# Patient Record
Sex: FEMALE | Race: WHITE | NOT HISPANIC OR LATINO | Employment: FULL TIME | URBAN - METROPOLITAN AREA
[De-identification: names, ages, dates, MRNs, and addresses within clinical notes are randomized per-mention and may not be internally consistent; named-entity substitution may affect disease eponyms.]

---

## 2017-04-11 ENCOUNTER — OFFICE VISIT (OUTPATIENT)
Dept: OBSTETRICS AND GYNECOLOGY | Facility: CLINIC | Age: 27
End: 2017-04-11
Payer: COMMERCIAL

## 2017-04-11 VITALS
DIASTOLIC BLOOD PRESSURE: 78 MMHG | BODY MASS INDEX: 26.79 KG/M2 | SYSTOLIC BLOOD PRESSURE: 122 MMHG | WEIGHT: 156.06 LBS

## 2017-04-11 DIAGNOSIS — Z12.4 CERVICAL CANCER SCREENING: ICD-10-CM

## 2017-04-11 DIAGNOSIS — Z01.419 ROUTINE GYNECOLOGICAL EXAMINATION: Primary | ICD-10-CM

## 2017-04-11 DIAGNOSIS — E28.9 OVARIAN DYSFUNCTION: ICD-10-CM

## 2017-04-11 DIAGNOSIS — N63.20 LEFT BREAST MASS: ICD-10-CM

## 2017-04-11 PROCEDURE — 99395 PREV VISIT EST AGE 18-39: CPT | Mod: S$GLB,,, | Performed by: OBSTETRICS & GYNECOLOGY

## 2017-04-11 PROCEDURE — 99999 PR PBB SHADOW E&M-EST. PATIENT-LVL III: CPT | Mod: PBBFAC,,, | Performed by: OBSTETRICS & GYNECOLOGY

## 2017-04-11 PROCEDURE — 88175 CYTOPATH C/V AUTO FLUID REDO: CPT

## 2017-04-11 RX ORDER — CLOMIPHENE CITRATE 50 MG/1
100 TABLET ORAL DAILY
Qty: 10 TABLET | Refills: 2 | Status: SHIPPED | OUTPATIENT
Start: 2017-04-11 | End: 2017-04-16

## 2017-04-11 RX ORDER — CLOMIPHENE CITRATE 50 MG/1
TABLET ORAL
Refills: 2 | COMMUNITY
Start: 2017-03-23 | End: 2017-04-11

## 2017-04-11 NOTE — PROGRESS NOTES
Chief Complaint   Patient presents with    Well Woman    Medication Management     discuss clomid, has been on it for 6 months       History of Present Illness: Berto Chun is a 26 y.o. female that presents today 4/11/2017 for well gyn visit.    Past Medical History:   Diagnosis Date    Abnormal Pap smear of cervix     Acne     HPV (human papilloma virus) infection     Thyroid goiter        Past Surgical History:   Procedure Laterality Date    CERVICAL BIOPSY  W/ LOOP ELECTRODE EXCISION  8/14, 2/15    x2    COLPOSCOPY  9/2014, 2/2015    mild dysplasia    WISDOM TOOTH EXTRACTION         Current Outpatient Prescriptions   Medication Sig Dispense Refill    PNV#75/IRON FUM/FA/OM3/DHA/EPA (ONE A DAY WOMEN'S PRENATAL DHA ORAL) Take 1 tablet by mouth once daily.      clindamycin-tretinoin (ZIANA) gel Apply a thin coat to the affected area every night.      clomiPHENE (CLOMID) 50 mg tablet Take 2 tablets (100 mg total) by mouth once daily. Day 3,4,5,6,7 10 tablet 2    medroxyPROGESTERone (PROVERA) 10 MG tablet Take 1 tablet (10 mg total) by mouth once daily. 14-24 of each cycle 30 tablet 11     No current facility-administered medications for this visit.        Review of patient's allergies indicates:  No Known Allergies    Family History   Problem Relation Age of Onset    Lung cancer Paternal Grandfather     Leukemia Paternal Grandmother      ALL    Lung cancer Maternal Grandmother     Pancreatic cancer Maternal Grandfather     Hypertension Father     Hyperlipidemia Mother     Diabetes Mother      Insulin dependent    Breast cancer Neg Hx     Ovarian cancer Neg Hx        Social History     Social History    Marital status:      Spouse name: N/A    Number of children: N/A    Years of education: N/A     Social History Main Topics    Smoking status: Never Smoker    Smokeless tobacco: Never Used    Alcohol use 0.0 oz/week     0 Standard drinks or equivalent per week      Comment: Social     Drug use: No    Sexual activity: Yes     Partners: Male     Birth control/ protection: None     Other Topics Concern    None     Social History Narrative       OB History    Para Term  AB SAB TAB Ectopic Multiple Living   0 0 0 0 0 0 0 0 0 0             Review of Symptoms:  GENERAL: Denies weight gain or weight loss. Feeling well overall.   SKIN: Denies rash or lesions.   HEAD: Denies head injury or headache.   NODES: Denies enlarged lymph nodes.   CHEST: Denies chest pain or shortness of breath.   CARDIOVASCULAR: Denies palpitations or left sided chest pain.   ABDOMEN: No abdominal pain, constipation, diarrhea, nausea, vomiting or rectal bleeding.   URINARY: No frequency, dysuria, hematuria, or burning on urination.  HEMATOLOGIC: No easy bruisability or excessive bleeding.   MUSCULOSKELETAL: Denies joint pain or swelling.     /78  Wt 70.8 kg (156 lb 1.4 oz)  LMP 2017  BMI 26.79 kg/m2  Physical Exam:  APPEARANCE: Well nourished, well developed, in no acute distress.  SKIN: Normal skin turgor, no lesions.  NECK: Neck symmetric without masses   RESPIRATORY: Normal respiratory effort with no retractions or use of accessory muscles  CARDIOVASCULAR: Peripheral vascular system with no swelling no varicosities and palpation of pulses normal  LYMPHATIC: No enlargements of the lymph nodes noted in the neck, axillae, or groin  ABDOMEN: Soft. No tenderness or masses. No hepatosplenomegaly. No hernias.  BREASTS: Symmetrical, no skin changes or visible lesions. ++ 9 o'clock left breast mass 4 cm X 6 cm oval mobile non-tender 3 cm from the areola no nipple discharge or adenopathy bilaterally.  PELVIC: Normal external female genitalia without lesions. Normal hair distribution. Adequate perineal body, normal urethral meatus. Urethra with no masses.  Bladder nontender. Vagina moist and well rugated without lesions or discharge. Cervix pink and without lesions. No significant cystocele or rectocele.  Bimanual exam showed uterus normal size, shape, position, mobile and nontender. Adnexa without masses or tenderness. Urethra and bladder normal. PAP DONE  EXTREMITIES: No clubbing cyanosis or edema.    ASSESSMENT/PLAN:  Routine gynecological examination    Cervical cancer screening  -     Liquid-based pap smear, screening    Ovarian dysfunction  -     clomiPHENE (CLOMID) 50 mg tablet; Take 2 tablets (100 mg total) by mouth once daily. Day 3,4,5,6,7  Dispense: 10 tablet; Refill: 2    Left breast mass  -     US Breast Left Complete; Future; Expected date: 4/11/17          Patient was counseled today on Pap guidelines, recommendation for pelvic exams, mammograms every other year after the age of 40 and annually after the age of 50, Colonoscopy after the age of 50, Dexa Bone Scan and calcium and vitamin D supplementation in menopause and to see her PCP for other health maintenance.   FOLLOW-UP:prn

## 2017-04-11 NOTE — MR AVS SNAPSHOT
[facility]  [address] [place] [address]  Phone: [phone]                  Berto Chun   2017 11:20 AM   Office Visit    Description:  Female : 1990   Provider:  Cindi Rodriguez MD   Department:  [facility]           Reason for Visit     Well Woman     Medication Management           Diagnoses this Visit        Comments    Cervical cancer screening    -  Primary            To Do List           Future Appointments        Provider Department Dept Phone    2017 11:20 AM Cindi Rodriguez MD [phone]      Goals (5 Years of Data)     None      [facility]     [facility] On Call Nurse Care Line -  Assistance  Unless otherwise directed by your provider, please contact [facility], our nurse care line that is available for  assistance.     Registered nurses in the [facility] On Call Center provide: appointment scheduling, clinical advisement, health education, and other advisory services.  Call: [phone] (toll free)               Medications           Message regarding Medications     Verify the changes and/or additions to your medication regime listed below are the same as discussed with your clinician today.  If any of these changes or additions are incorrect, please notify your healthcare provider.             Verify that the below list of medications is an accurate representation of the medications you are currently taking.  If none reported, the list may be blank. If incorrect, please contact your healthcare provider. Carry this list with you in case of emergency.           Current Medications     clomiPHENE (CLOMID) 50 mg tablet TK 1 T PO  QD ON CYCLE DAYS 3-7    PNV#75/IRON FUM/FA/OM3/DHA/EPA (ONE A DAY WOMEN'S PRENATAL DHA ORAL) Take 1 tablet by mouth once daily.    clindamycin-tretinoin (ZIANA) gel Apply a thin coat to the affected area every night.    medroxyPROGESTERone (PROVERA) 10 MG tablet  Take 1 tablet (10 mg total) by mouth once daily. 14-24 of each cycle           Clinical Reference Information           Your Vitals Were     BP Weight Last Period BMI       122/78 70.8 kg (156 lb 1.4 oz) 03/23/2017 26.79 kg/m2       Blood Pressure          Most Recent Value    BP  122/78      Allergies as of 4/11/2017     No Known Allergies      Immunizations Administered on Date of Encounter - 4/11/2017     None      Orders Placed During Today's Visit      Normal Orders This Visit    Liquid-based pap smear, screening       Language Assistance Services     ATTENTION: Language assistance services are available, free of charge. Please call [phone].      ATENCIÓN: Si habla español, tiene a bui disposición servicios gratuitos de asistencia lingüística. Llame al [phone].     CHÚ Ý: N?u b?n nói Ti?ng Vi?t, có các d?ch v? h? tr? ngôn ng? mi?n phí dành cho b?n. G?i s? [phone].         [facility] complies with applicable Federal civil rights laws and does not discriminate on the basis of race, color, national origin, age, disability, or sex.

## 2017-04-12 ENCOUNTER — HOSPITAL ENCOUNTER (OUTPATIENT)
Dept: RADIOLOGY | Facility: HOSPITAL | Age: 27
Discharge: HOME | End: 2017-04-12
Attending: OBSTETRICS & GYNECOLOGY
Payer: COMMERCIAL

## 2017-04-12 ENCOUNTER — TELEPHONE (OUTPATIENT)
Dept: RADIOLOGY | Facility: HOSPITAL | Age: 27
End: 2017-04-12

## 2017-04-12 DIAGNOSIS — N63.20 LEFT BREAST MASS: ICD-10-CM

## 2017-04-12 PROCEDURE — 77062 BREAST TOMOSYNTHESIS BI: CPT | Mod: 26,,, | Performed by: RADIOLOGY

## 2017-04-12 PROCEDURE — 76642 ULTRASOUND BREAST LIMITED: CPT | Mod: TC,PO,LT

## 2017-04-12 PROCEDURE — 76642 ULTRASOUND BREAST LIMITED: CPT | Mod: 26,LT,, | Performed by: RADIOLOGY

## 2017-04-12 PROCEDURE — 77066 DX MAMMO INCL CAD BI: CPT | Mod: 26,,, | Performed by: RADIOLOGY

## 2017-04-12 PROCEDURE — 77062 BREAST TOMOSYNTHESIS BI: CPT | Mod: TC

## 2017-04-12 NOTE — TELEPHONE ENCOUNTER
Pt. Has Ultrasound an Diagnostic Mm for left breast lump. Pt. Scheduled for ultrasound guided core Bx. On 4/17/17 @ 8:30. Dr. Keith spoke to pt. Order are in.

## 2017-04-17 ENCOUNTER — HOSPITAL ENCOUNTER (OUTPATIENT)
Dept: RADIOLOGY | Facility: HOSPITAL | Age: 27
Discharge: HOME | End: 2017-04-17
Attending: OBSTETRICS & GYNECOLOGY
Payer: COMMERCIAL

## 2017-04-17 DIAGNOSIS — R92.8 ABNORMAL MAMMOGRAM: ICD-10-CM

## 2017-04-17 DIAGNOSIS — Z87.898 HISTORY OF ABNORMAL MAMMOGRAM: ICD-10-CM

## 2017-04-17 PROCEDURE — 19083 BX BREAST 1ST LESION US IMAG: CPT | Mod: PO

## 2017-04-17 PROCEDURE — 19083 BX BREAST 1ST LESION US IMAG: CPT | Mod: ,,, | Performed by: RADIOLOGY

## 2017-04-17 PROCEDURE — 88360 TUMOR IMMUNOHISTOCHEM/MANUAL: CPT | Mod: 26,,, | Performed by: PATHOLOGY

## 2017-04-17 PROCEDURE — 88305 TISSUE EXAM BY PATHOLOGIST: CPT | Performed by: PATHOLOGY

## 2017-04-17 PROCEDURE — 88305 TISSUE EXAM BY PATHOLOGIST: CPT | Mod: 26,,, | Performed by: PATHOLOGY

## 2017-04-19 ENCOUNTER — TELEPHONE (OUTPATIENT)
Dept: RADIOLOGY | Facility: HOSPITAL | Age: 27
End: 2017-04-19

## 2017-04-19 ENCOUNTER — OFFICE VISIT (OUTPATIENT)
Dept: SURGERY | Facility: CLINIC | Age: 27
End: 2017-04-19
Payer: COMMERCIAL

## 2017-04-19 VITALS — BODY MASS INDEX: 26.64 KG/M2 | HEIGHT: 64 IN | WEIGHT: 156.06 LBS

## 2017-04-19 DIAGNOSIS — C50.912 INVASIVE DUCTAL CARCINOMA OF BREAST, LEFT: Primary | ICD-10-CM

## 2017-04-19 DIAGNOSIS — D05.12 DUCTAL CARCINOMA IN SITU (DCIS) OF LEFT BREAST: ICD-10-CM

## 2017-04-19 PROCEDURE — 99245 OFF/OP CONSLTJ NEW/EST HI 55: CPT | Mod: S$GLB,,, | Performed by: SURGERY

## 2017-04-19 PROCEDURE — 99999 PR PBB SHADOW E&M-EST. PATIENT-LVL III: CPT | Mod: PBBFAC,,, | Performed by: SURGERY

## 2017-04-19 NOTE — LETTER
April 19, 2017      Cindi Rodriguez MD  [address]  [address]  [facility] [address]           Critical access hospital General Surgery  [address] [facility] [address]  [facility] [address]  Phone: [phone]          Patient: Berto Chun   MR Number: [id number]   YOB: 1990   Date of Visit: 4/19/2017       Dear Dr. Cindi Rodriguez:    Thank you for referring Berto Chun to me for evaluation. Attached you will find relevant portions of my assessment and plan of care.    If you have questions, please do not hesitate to call me. I look forward to following Berto Chun along with you.    Sincerely,    Jasiel Sapp MD    Enclosure  CC:  No Recipients    If you would like to receive this communication electronically, please contact [email or web address] or [phone] to request more information on [facility] access.    For providers and/or their staff who would like to refer a patient to [facility], please contact us through our one-stop-shop provider referral line, [facility], at [phone].    If you feel you have received this communication in error or would no longer like to receive these types of communications, please e-mail [email or web address]

## 2017-04-19 NOTE — PROGRESS NOTES
Subjective:       Patient ID: Berto Chun is a 26 y.o. female.    Chief Complaint: Consult (abn mammo, left breast, DCIS)    HPI Comments: Patient presents for surgical treatment of breast cancer. Patient was referred for evaluation and discussion of treatment options for carcinoma of the left UIQ. she is 2 days following core biopsy, left demonstrating an intermediate grade invasive ductal cancer. Estrogen receptor status is positive. Progesterone receptor status is positive. HER-2/raeann receptors FISH assay is pending.    FINAL PATHOLOGIC DIAGNOSIS  CORE BIOPSY, LEFT BREAST SHOWING INTERMEDIATE GRADE DCIS AND INVASIVE DUCT CARCINOMA  (3, 2, 1) WITH MICROCALCIFICATIONS PRESENT. THE LARGEST LINEAR MEASUREMENT OF INVASIVE  CARCINOMA IS APPROXIMATELY 5 MM. THE RESULTS OF IMMUNE RECEPTOR STUDIES ARE PENDING  WITH REPORT TO FOLLOW      Supplemental Diagnosis  THE RESULTS OF IMMUNE RECEPTOR STUDIES ARE AS FOLLOWS: ER IS POSITIVE WITH BETWEEN 90  % OF TUMOR CELL NUCLEI STAINING STRONGLY. ME IS POSITIVE WITH BETWEEN 30-40% OF  TUMOR CELL NUCLEI STAINING STRONGLY. IMMUNOSTAIN FOR HER-2/RAEANN IS EQUIVOCAL AND I WILL  SEND THIS TISSUE BLOCK OUT FOR FISH ANALYSIS. THE POSITIVE AND NEGATIVE CONTROLS STAIN  APPROPRIATELY.     Change was noted 6 months ago. Patient does not routinely do self breast exams.  Patient has noted a change on breast exam. Breast cancer risk factors include none.   Age of menarche was 9.  Last menstrual period was  3/23/17. Patient admits to hormonal therapy. Patient is .  Patient denies nipple discharge. Patient denies to previous breast biopsy. Patient denies a personal history of breast cancer.      Review of Systems   Constitutional: Negative for appetite change, chills, diaphoresis, fatigue, fever and unexpected weight change.   HENT: Negative for hearing loss, sore throat, trouble swallowing and voice change.    Eyes: Negative for visual disturbance.   Respiratory: Negative for cough,  shortness of breath and wheezing.    Cardiovascular: Negative for chest pain, palpitations and leg swelling.   Gastrointestinal: Negative for abdominal distention, abdominal pain, anal bleeding, blood in stool, constipation, diarrhea, nausea, rectal pain and vomiting.   Genitourinary: Negative for difficulty urinating, dysuria, flank pain, frequency, hematuria, menstrual problem and urgency.   Musculoskeletal: Negative for arthralgias, back pain, joint swelling, myalgias and neck pain.   Skin: Negative for pallor and rash.   Neurological: Negative for dizziness, syncope, weakness and headaches.   Hematological: Negative for adenopathy. Does not bruise/bleed easily.   Psychiatric/Behavioral: Negative for suicidal ideas. The patient is not nervous/anxious.        Objective:      Physical Exam   Constitutional: She is oriented to person, place, and time. She appears well-developed and well-nourished. No distress.   HENT:   Head: Normocephalic and atraumatic.   Right Ear: External ear normal.   Left Ear: External ear normal.   Eyes: Conjunctivae are normal. Pupils are equal, round, and reactive to light. Right eye exhibits no discharge. Left eye exhibits no discharge.   Neck: No tracheal deviation present. No thyromegaly present.   Cardiovascular: Normal rate, regular rhythm and normal heart sounds.    Pulmonary/Chest: Effort normal and breath sounds normal. No respiratory distress. Right breast exhibits no inverted nipple, no mass, no nipple discharge, no skin change and no tenderness. Left breast exhibits mass and skin change (echymoses). Left breast exhibits no inverted nipple, no nipple discharge and no tenderness.       Musculoskeletal: She exhibits no edema or tenderness.   Lymphadenopathy:     She has no cervical adenopathy.     She has no axillary adenopathy.   Neurological: She is alert and oriented to person, place, and time. No cranial nerve deficit.   Skin: Skin is warm and dry. No rash noted. She is not  diaphoretic. No pallor.   Psychiatric: She has a normal mood and affect. Her behavior is normal. Judgment and thought content normal.       Assessment:       1. Invasive ductal carcinoma of breast, left    2. Ductal carcinoma in situ (DCIS) of left breast        Plan:       Case discussed with Dr. Gaming who will see patient in consult this week.  Treatment options discussed - lumpectomy/radiation vs mastectomy +/- reconstruction, SLN biopsy, induction chemotherapy (defer to Dr. CAMPO).  PET CT ordered.  Await Dr. Gaming's consultation.  All questions were answered.

## 2017-04-19 NOTE — TELEPHONE ENCOUNTER
Spoke with pt about appt with Dr. Sapp, provided address phone number and time. She had no further questions at this time.

## 2017-04-19 NOTE — TELEPHONE ENCOUNTER
Please call patient in regards to her left breast biopsy results. Patholgy came back as  DCIS and invasive ductal carcinoma. They are still doing hormone receptor studies.

## 2017-04-21 ENCOUNTER — INITIAL CONSULT (OUTPATIENT)
Dept: HEMATOLOGY/ONCOLOGY | Facility: CLINIC | Age: 27
End: 2017-04-21
Payer: COMMERCIAL

## 2017-04-21 ENCOUNTER — PATIENT MESSAGE (OUTPATIENT)
Dept: SURGERY | Facility: CLINIC | Age: 27
End: 2017-04-21

## 2017-04-21 ENCOUNTER — LAB VISIT (OUTPATIENT)
Dept: LAB | Facility: HOSPITAL | Age: 27
End: 2017-04-21
Attending: INTERNAL MEDICINE
Payer: COMMERCIAL

## 2017-04-21 VITALS
TEMPERATURE: 98 F | DIASTOLIC BLOOD PRESSURE: 74 MMHG | BODY MASS INDEX: 27.62 KG/M2 | HEIGHT: 63 IN | WEIGHT: 155.88 LBS | SYSTOLIC BLOOD PRESSURE: 120 MMHG | HEART RATE: 88 BPM | RESPIRATION RATE: 16 BRPM

## 2017-04-21 DIAGNOSIS — C50.012 MALIGNANT NEOPLASM OF NIPPLE OF LEFT BREAST IN FEMALE: Primary | ICD-10-CM

## 2017-04-21 DIAGNOSIS — C50.012 MALIGNANT NEOPLASM OF NIPPLE OF LEFT BREAST IN FEMALE: ICD-10-CM

## 2017-04-21 LAB
ALBUMIN SERPL BCP-MCNC: 4.6 G/DL
ALP SERPL-CCNC: 63 U/L
ALT SERPL W/O P-5'-P-CCNC: 23 U/L
ANION GAP SERPL CALC-SCNC: 9 MMOL/L
AST SERPL-CCNC: 26 U/L
BASOPHILS # BLD AUTO: 0.05 K/UL
BASOPHILS NFR BLD: 0.5 %
BILIRUB SERPL-MCNC: 0.6 MG/DL
BUN SERPL-MCNC: 11 MG/DL
CALCIUM SERPL-MCNC: 8.9 MG/DL
CHLORIDE SERPL-SCNC: 105 MMOL/L
CO2 SERPL-SCNC: 27 MMOL/L
CREAT SERPL-MCNC: 0.78 MG/DL
DIFFERENTIAL METHOD: ABNORMAL
EOSINOPHIL # BLD AUTO: 0.1 K/UL
EOSINOPHIL NFR BLD: 1.1 %
ERYTHROCYTE [DISTWIDTH] IN BLOOD BY AUTOMATED COUNT: 11.9 %
EST. GFR  (AFRICAN AMERICAN): >60 ML/MIN/1.73 M^2
EST. GFR  (NON AFRICAN AMERICAN): >60 ML/MIN/1.73 M^2
GLUCOSE SERPL-MCNC: 97 MG/DL
HCT VFR BLD AUTO: 39 %
HGB BLD-MCNC: 13.4 G/DL
LDH SERPL L TO P-CCNC: 412 U/L
LYMPHOCYTES # BLD AUTO: 3.2 K/UL
LYMPHOCYTES NFR BLD: 32 %
MAGNESIUM SERPL-MCNC: 2 MG/DL
MCH RBC QN AUTO: 31.1 PG
MCHC RBC AUTO-ENTMCNC: 34.4 %
MCV RBC AUTO: 91 FL
MONOCYTES # BLD AUTO: 0.7 K/UL
MONOCYTES NFR BLD: 7.3 %
NEUTROPHILS # BLD AUTO: 5.9 K/UL
NEUTROPHILS NFR BLD: 59.1 %
NRBC BLD-RTO: 0 /100 WBC
PLATELET # BLD AUTO: 296 K/UL
PMV BLD AUTO: 9.7 FL
POTASSIUM SERPL-SCNC: 4.4 MMOL/L
PROT SERPL-MCNC: 7.9 G/DL
RBC # BLD AUTO: 4.31 M/UL
SODIUM SERPL-SCNC: 141 MMOL/L
WBC # BLD AUTO: 9.95 K/UL

## 2017-04-21 PROCEDURE — 83615 LACTATE (LD) (LDH) ENZYME: CPT

## 2017-04-21 PROCEDURE — 80053 COMPREHEN METABOLIC PANEL: CPT | Mod: PN

## 2017-04-21 PROCEDURE — 85025 COMPLETE CBC W/AUTO DIFF WBC: CPT | Mod: PN

## 2017-04-21 PROCEDURE — 85025 COMPLETE CBC W/AUTO DIFF WBC: CPT

## 2017-04-21 PROCEDURE — 83735 ASSAY OF MAGNESIUM: CPT | Mod: PN

## 2017-04-21 PROCEDURE — 83615 LACTATE (LD) (LDH) ENZYME: CPT | Mod: PN

## 2017-04-21 PROCEDURE — 99999 PR PBB SHADOW E&M-EST. PATIENT-LVL III: CPT | Mod: PBBFAC,,, | Performed by: INTERNAL MEDICINE

## 2017-04-21 PROCEDURE — 83735 ASSAY OF MAGNESIUM: CPT

## 2017-04-21 PROCEDURE — 80053 COMPREHEN METABOLIC PANEL: CPT

## 2017-04-21 PROCEDURE — 99245 OFF/OP CONSLTJ NEW/EST HI 55: CPT | Mod: S$GLB,,, | Performed by: INTERNAL MEDICINE

## 2017-04-21 PROCEDURE — 36415 COLL VENOUS BLD VENIPUNCTURE: CPT | Mod: PN

## 2017-04-21 NOTE — LETTER
April 21, 2017        Jasiel Sapp MD  [address]  [address]  [place] [address]             [place] [address] [facility] [address]  [facility] [address]  Phone: [phone]  Fax: [phone]   Patient: Berto Chun   MR Number: [id number]   YOB: 1990   Date of Visit: 4/21/2017       Dear Dr. Sapp:    Thank you for referring Berto Chun to me for evaluation of invasive left breast cancer. Below are the relevant portions of my assessment and plan of care.  If you have questions, please do not hesitate to call me. I look forward to following Berto along with you.    Sincerely,      MD CARLOTTA Watson MD Nancy Nelline Thomas, MD

## 2017-04-22 NOTE — PROGRESS NOTES
CONSULT NOTE    CHIEF COMPLAINT:  Newly diagnosed left breast carcinoma.    HISTORY OF PRESENT ILLNESS:  The patient is a 26-year-old white female who noted   a palpable left breast mass between September and October, which she thought   was present as a result of ongoing changes in association with her menstrual   cycle.  When the mass did not resolve, she brought it to the attention of her   gynecologist at the time of routine examination.  She thought that the mass was   Suspicious and obtained additional imaging, which led to a biopsy.  Pathology from   that procedure is remarkable for the presence of an intermediate grade   infiltrating ductal carcinoma, which is ER and [place] positive and HER-2/raeann   indeterminate.  FISH analysis for HER-2/raeann is pending at the time of dictation.    Following biopsy, the patient has some minimal residual breast tenderness and   small amount of bruising.  Otherwise, she remains in her usual state of health   and has an unremarkable 14-point review of systems.    PAST MEDICAL HISTORY:  1.  Status post oral surgery for wisdom tooth extraction.  2.  Thyroid nodules.    ALLERGIES:  None.    MEDICATIONS:  1.  Clindamycin/tretinoin as directed.  2.  Prenatal vitamin one daily.  3.  Provera 10 mg daily.    FAMILY AND SOCIAL HISTORY:  Nonsmoker, rare alcohol user whose family history is   remarkable for maternal uncle who suffered from pancreatic carcinoma in his 70s   and was a smoker.  There are no other family members with history of breast   carcinoma.  There are three family members who suffered from colon carcinomas   with typical age of onset and another family member who suffered from acute   lymphoblastic leukemia.    PHYSICAL EXAMINATION:  GENERAL:  Well-developed, well-nourished white female in no acute distress.  VITAL SIGNS:  Weight of 155-1/2 pounds.  HEENT:  Normocephalic, atraumatic.  Oral mucosa pink and moist.  Lips without   lesions.  Tongue midline.  Oropharynx clear.   Nonicteric sclerae.  NECK:  Supple, no adenopathy.  No carotid bruits, thyromegaly or thyroid nodule.  HEART:  Regular rate and rhythm without murmur, gallop or rub.  LUNGS:  Clear to auscultation bilaterally.  Normal respiratory effort.  ABDOMEN:  Soft, nontender, nondistended with positive normoactive bowel sounds,   no hepatosplenomegaly.  EXTREMITIES:  No cyanosis, clubbing or edema.  Distal pulses are intact.  AXILLAE AND GROIN:  No palpable pathologic lymphadenopathy is appreciated.  SKIN:  Intact/turgor normal.  NEUROLOGIC:  Cranial nerves II-XII grossly intact.  Motor:  Good muscle bulk and   tone.  Strength/sensory 5/5 throughout.  Gait stable.  BREASTS:  The patient's right breast is free from masses, tenderness or nipple   discharge.  The patient's left breast reveals a palpable 3.5 cm mass in the   upper inner quadrant and some residual hematoma formation consistent with recent   biopsy.    IMPRESSION:  Infiltrating ductal carcinoma of the left breast.    PLAN:  1.  Baseline lab to include CBC, CMP, LDH and magnesium.  2.  Genetic testing for BRCA1 and BRCA2.  3.  Obtain MUGA scan for assessment of left ventricular ejection fraction in   anticipation of anthracycline based chemotherapy.  4.  Consult Dr. Sapp for implantation of MediPort catheter in order to   facilitate therapy.  5.  An hour of contact time was spent counseling the patient concerning her   diagnosis, rationale for workup and neoadjuvant treatment.  6.  The patient will undergo CT PET scan as scheduled on Tuesday and is to   return to my clinic to review results of lab and imaging studies as well as to   finalize plan of care.      OPAL  dd: 04/21/2017 20:01:30 (CDT)  td: 04/21/2017 23:24:16 (CDT)  Doc ID   #[id number]  Job ID #[id number]    CC:

## 2017-04-24 RX ORDER — SODIUM CHLORIDE 9 MG/ML
INJECTION, SOLUTION INTRAVENOUS CONTINUOUS
Status: CANCELLED | OUTPATIENT
Start: 2017-04-27

## 2017-04-25 ENCOUNTER — HOSPITAL ENCOUNTER (OUTPATIENT)
Dept: RADIOLOGY | Facility: HOSPITAL | Age: 27
Discharge: HOME | End: 2017-04-25
Attending: SURGERY
Payer: COMMERCIAL

## 2017-04-25 DIAGNOSIS — C50.912 INVASIVE DUCTAL CARCINOMA OF BREAST, LEFT: ICD-10-CM

## 2017-04-25 PROCEDURE — 78816 PET IMAGE W/CT FULL BODY: CPT | Mod: TC,PO

## 2017-04-25 PROCEDURE — 78816 PET IMAGE W/CT FULL BODY: CPT | Mod: 26,PI,, | Performed by: RADIOLOGY

## 2017-04-26 ENCOUNTER — HOSPITAL ENCOUNTER (OUTPATIENT)
Dept: RADIOLOGY | Facility: HOSPITAL | Age: 27
Discharge: HOME | End: 2017-04-26
Attending: INTERNAL MEDICINE
Payer: COMMERCIAL

## 2017-04-26 ENCOUNTER — ANESTHESIA EVENT (OUTPATIENT)
Dept: SURGERY | Facility: HOSPITAL | Age: 27
End: 2017-04-26
Payer: COMMERCIAL

## 2017-04-26 ENCOUNTER — PATIENT MESSAGE (OUTPATIENT)
Dept: HEMATOLOGY/ONCOLOGY | Facility: CLINIC | Age: 27
End: 2017-04-26

## 2017-04-26 DIAGNOSIS — C50.212 MALIGNANT NEOPLASM OF UPPER-INNER QUADRANT OF LEFT FEMALE BREAST: ICD-10-CM

## 2017-04-26 DIAGNOSIS — C77.9 REGIONAL LYMPH NODE METASTASIS PRESENT: ICD-10-CM

## 2017-04-26 DIAGNOSIS — C50.012 MALIGNANT NEOPLASM OF NIPPLE OF LEFT BREAST IN FEMALE: ICD-10-CM

## 2017-04-26 PROCEDURE — 78472 GATED HEART PLANAR SINGLE: CPT | Mod: TC,PO

## 2017-04-26 PROCEDURE — 78472 GATED HEART PLANAR SINGLE: CPT | Mod: 26,,, | Performed by: RADIOLOGY

## 2017-04-27 ENCOUNTER — HOSPITAL ENCOUNTER (OUTPATIENT)
Facility: HOSPITAL | Age: 27
Discharge: HOME | End: 2017-04-27
Attending: SURGERY | Admitting: SURGERY
Payer: COMMERCIAL

## 2017-04-27 ENCOUNTER — ANESTHESIA (OUTPATIENT)
Dept: SURGERY | Facility: HOSPITAL | Age: 27
End: 2017-04-27
Payer: COMMERCIAL

## 2017-04-27 ENCOUNTER — SURGERY (OUTPATIENT)
Age: 27
End: 2017-04-27

## 2017-04-27 ENCOUNTER — TELEPHONE (OUTPATIENT)
Dept: HEMATOLOGY/ONCOLOGY | Facility: CLINIC | Age: 27
End: 2017-04-27

## 2017-04-27 DIAGNOSIS — C50.919 MALIGNANT NEOPLASM OF FEMALE BREAST, UNSPECIFIED LATERALITY, UNSPECIFIED SITE OF BREAST: Primary | ICD-10-CM

## 2017-04-27 DIAGNOSIS — C50.912 INVASIVE DUCTAL CARCINOMA OF BREAST, LEFT: ICD-10-CM

## 2017-04-27 PROCEDURE — 63600175 PHARM REV CODE 636 W HCPCS: Performed by: ANESTHESIOLOGY

## 2017-04-27 PROCEDURE — 37000008 HC ANESTHESIA 1ST 15 MINUTES: Performed by: SURGERY

## 2017-04-27 PROCEDURE — C1788 PORT, INDWELLING, IMP: HCPCS | Performed by: SURGERY

## 2017-04-27 PROCEDURE — 25000003 PHARM REV CODE 250: Performed by: ANESTHESIOLOGY

## 2017-04-27 PROCEDURE — 71000033 HC RECOVERY, INTIAL HOUR: Performed by: SURGERY

## 2017-04-27 PROCEDURE — 36000707: Performed by: SURGERY

## 2017-04-27 PROCEDURE — 63600175 PHARM REV CODE 636 W HCPCS: Performed by: NURSE ANESTHETIST, CERTIFIED REGISTERED

## 2017-04-27 PROCEDURE — D9220A PRA ANESTHESIA: Mod: ANES,,, | Performed by: ANESTHESIOLOGY

## 2017-04-27 PROCEDURE — 37000009 HC ANESTHESIA EA ADD 15 MINS: Performed by: SURGERY

## 2017-04-27 PROCEDURE — 71000015 HC POSTOP RECOV 1ST HR: Performed by: SURGERY

## 2017-04-27 PROCEDURE — 25000003 PHARM REV CODE 250: Performed by: SURGERY

## 2017-04-27 PROCEDURE — 36000706: Performed by: SURGERY

## 2017-04-27 PROCEDURE — 63600175 PHARM REV CODE 636 W HCPCS: Performed by: SURGERY

## 2017-04-27 PROCEDURE — 25000003 PHARM REV CODE 250: Performed by: NURSE ANESTHETIST, CERTIFIED REGISTERED

## 2017-04-27 PROCEDURE — 36561 INSERT TUNNELED CV CATH: CPT | Mod: ,,, | Performed by: SURGERY

## 2017-04-27 PROCEDURE — 99900103 DSU ONLY-NO CHARGE-INITIAL HR (STAT): Performed by: SURGERY

## 2017-04-27 PROCEDURE — D9220A PRA ANESTHESIA: Mod: CRNA,,, | Performed by: NURSE ANESTHETIST, CERTIFIED REGISTERED

## 2017-04-27 PROCEDURE — 77001 FLUOROGUIDE FOR VEIN DEVICE: CPT | Mod: 26,,, | Performed by: SURGERY

## 2017-04-27 PROCEDURE — 99900104 DSU ONLY-NO CHARGE-EA ADD'L HR (STAT): Performed by: SURGERY

## 2017-04-27 DEVICE — KIT POWERPORT SINGLE 8FR: Type: IMPLANTABLE DEVICE | Site: SUBCLAVIAN | Status: FUNCTIONAL

## 2017-04-27 RX ORDER — OXYCODONE HYDROCHLORIDE 5 MG/1
5 TABLET ORAL ONCE AS NEEDED
Status: DISCONTINUED | OUTPATIENT
Start: 2017-04-27 | End: 2017-04-27 | Stop reason: HOSPADM

## 2017-04-27 RX ORDER — LIDOCAINE HYDROCHLORIDE 10 MG/ML
1 INJECTION, SOLUTION EPIDURAL; INFILTRATION; INTRACAUDAL; PERINEURAL ONCE
Status: COMPLETED | OUTPATIENT
Start: 2017-04-27 | End: 2017-04-27

## 2017-04-27 RX ORDER — ONDANSETRON 2 MG/ML
INJECTION INTRAMUSCULAR; INTRAVENOUS
Status: DISCONTINUED | OUTPATIENT
Start: 2017-04-27 | End: 2017-04-27

## 2017-04-27 RX ORDER — MIDAZOLAM HYDROCHLORIDE 1 MG/ML
INJECTION, SOLUTION INTRAMUSCULAR; INTRAVENOUS
Status: DISCONTINUED | OUTPATIENT
Start: 2017-04-27 | End: 2017-04-27

## 2017-04-27 RX ORDER — LIDOCAINE HCL/PF 100 MG/5ML
SYRINGE (ML) INTRAVENOUS
Status: DISCONTINUED | OUTPATIENT
Start: 2017-04-27 | End: 2017-04-27

## 2017-04-27 RX ORDER — HYDROMORPHONE HYDROCHLORIDE 2 MG/ML
1 INJECTION, SOLUTION INTRAMUSCULAR; INTRAVENOUS; SUBCUTANEOUS EVERY 4 HOURS PRN
Status: CANCELLED | OUTPATIENT
Start: 2017-04-27

## 2017-04-27 RX ORDER — LIDOCAINE HYDROCHLORIDE 10 MG/ML
INJECTION, SOLUTION EPIDURAL; INFILTRATION; INTRACAUDAL; PERINEURAL
Status: DISCONTINUED
Start: 2017-04-27 | End: 2017-04-27 | Stop reason: HOSPADM

## 2017-04-27 RX ORDER — PROPOFOL 10 MG/ML
VIAL (ML) INTRAVENOUS
Status: DISCONTINUED | OUTPATIENT
Start: 2017-04-27 | End: 2017-04-27

## 2017-04-27 RX ORDER — FENTANYL CITRATE 50 UG/ML
INJECTION, SOLUTION INTRAMUSCULAR; INTRAVENOUS
Status: DISCONTINUED | OUTPATIENT
Start: 2017-04-27 | End: 2017-04-27

## 2017-04-27 RX ORDER — BUPIVACAINE HCL/EPINEPHRINE 0.5-1:200K
VIAL (ML) INJECTION
Status: DISCONTINUED | OUTPATIENT
Start: 2017-04-27 | End: 2017-04-27 | Stop reason: HOSPADM

## 2017-04-27 RX ORDER — HYDROCODONE BITARTRATE AND ACETAMINOPHEN 5; 325 MG/1; MG/1
1 TABLET ORAL EVERY 4 HOURS PRN
Qty: 15 TABLET | Refills: 0 | Status: SHIPPED | OUTPATIENT
Start: 2017-04-27 | End: 2018-01-03

## 2017-04-27 RX ORDER — SODIUM CHLORIDE 9 MG/ML
INJECTION, SOLUTION INTRAVENOUS CONTINUOUS
Status: CANCELLED | OUTPATIENT
Start: 2017-04-27

## 2017-04-27 RX ORDER — FENTANYL CITRATE 50 UG/ML
25 INJECTION, SOLUTION INTRAMUSCULAR; INTRAVENOUS EVERY 5 MIN PRN
Status: DISCONTINUED | OUTPATIENT
Start: 2017-04-27 | End: 2017-04-27 | Stop reason: HOSPADM

## 2017-04-27 RX ORDER — ONDANSETRON 2 MG/ML
4 INJECTION INTRAMUSCULAR; INTRAVENOUS ONCE
Status: COMPLETED | OUTPATIENT
Start: 2017-04-27 | End: 2017-04-27

## 2017-04-27 RX ORDER — HEPARIN 100 UNIT/ML
SYRINGE INTRAVENOUS
Status: DISCONTINUED | OUTPATIENT
Start: 2017-04-27 | End: 2017-04-27 | Stop reason: HOSPADM

## 2017-04-27 RX ORDER — CEFAZOLIN SODIUM 2 G/50ML
2 SOLUTION INTRAVENOUS
Status: COMPLETED | OUTPATIENT
Start: 2017-04-27 | End: 2017-04-27

## 2017-04-27 RX ORDER — SODIUM CHLORIDE 9 MG/ML
INJECTION, SOLUTION INTRAVENOUS CONTINUOUS
Status: DISCONTINUED | OUTPATIENT
Start: 2017-04-27 | End: 2017-04-27 | Stop reason: HOSPADM

## 2017-04-27 RX ORDER — SODIUM CHLORIDE, SODIUM LACTATE, POTASSIUM CHLORIDE, CALCIUM CHLORIDE 600; 310; 30; 20 MG/100ML; MG/100ML; MG/100ML; MG/100ML
INJECTION, SOLUTION INTRAVENOUS CONTINUOUS
Status: DISCONTINUED | OUTPATIENT
Start: 2017-04-27 | End: 2017-04-27 | Stop reason: HOSPADM

## 2017-04-27 RX ORDER — DEXAMETHASONE SODIUM PHOSPHATE 4 MG/ML
INJECTION, SOLUTION INTRA-ARTICULAR; INTRALESIONAL; INTRAMUSCULAR; INTRAVENOUS; SOFT TISSUE
Status: DISCONTINUED | OUTPATIENT
Start: 2017-04-27 | End: 2017-04-27

## 2017-04-27 RX ADMIN — SODIUM CHLORIDE, SODIUM GLUCONATE, SODIUM ACETATE, POTASSIUM CHLORIDE, MAGNESIUM CHLORIDE, SODIUM PHOSPHATE, DIBASIC, AND POTASSIUM PHOSPHATE: .53; .5; .37; .037; .03; .012; .00082 INJECTION, SOLUTION INTRAVENOUS at 11:04

## 2017-04-27 RX ADMIN — HEPARIN SODIUM (PORCINE) LOCK FLUSH IV SOLN 100 UNIT/ML 1000 UNITS: 100 SOLUTION at 11:04

## 2017-04-27 RX ADMIN — BUPIVACAINE HYDROCHLORIDE AND EPINEPHRINE BITARTRATE 30 ML: 5; .0091 INJECTION, SOLUTION PERINEURAL at 11:04

## 2017-04-27 RX ADMIN — FENTANYL CITRATE 100 MCG: 50 INJECTION INTRAMUSCULAR; INTRAVENOUS at 10:04

## 2017-04-27 RX ADMIN — LIDOCAINE HYDROCHLORIDE 100 MG: 20 INJECTION, SOLUTION INTRAVENOUS at 10:04

## 2017-04-27 RX ADMIN — SODIUM CHLORIDE, SODIUM LACTATE, POTASSIUM CHLORIDE, AND CALCIUM CHLORIDE: .6; .31; .03; .02 INJECTION, SOLUTION INTRAVENOUS at 10:04

## 2017-04-27 RX ADMIN — PROPOFOL 140 MG: 10 INJECTION, EMULSION INTRAVENOUS at 10:04

## 2017-04-27 RX ADMIN — ONDANSETRON 4 MG: 2 INJECTION INTRAMUSCULAR; INTRAVENOUS at 11:04

## 2017-04-27 RX ADMIN — ONDANSETRON 4 MG: 2 INJECTION, SOLUTION INTRAMUSCULAR; INTRAVENOUS at 11:04

## 2017-04-27 RX ADMIN — CEFAZOLIN SODIUM 2 G: 2 SOLUTION INTRAVENOUS at 10:04

## 2017-04-27 RX ADMIN — LIDOCAINE HYDROCHLORIDE 10 MG: 10 INJECTION, SOLUTION EPIDURAL; INFILTRATION; INTRACAUDAL; PERINEURAL at 10:04

## 2017-04-27 RX ADMIN — DEXAMETHASONE SODIUM PHOSPHATE 4 MG: 4 INJECTION, SOLUTION INTRAMUSCULAR; INTRAVENOUS at 11:04

## 2017-04-27 RX ADMIN — MIDAZOLAM 2 MG: 1 INJECTION INTRAMUSCULAR; INTRAVENOUS at 10:04

## 2017-04-27 NOTE — H&P (VIEW-ONLY)
Subjective:       Patient ID: Berto Chun is a 26 y.o. female.    Chief Complaint: Consult (abn mammo, left breast, DCIS)    HPI Comments: Patient presents for surgical treatment of breast cancer. Patient was referred for evaluation and discussion of treatment options for carcinoma of the left UIQ. she is 2 days following core biopsy, left demonstrating an intermediate grade invasive ductal cancer. Estrogen receptor status is positive. Progesterone receptor status is positive. HER-2/raeann receptors FISH assay is pending.    FINAL PATHOLOGIC DIAGNOSIS  CORE BIOPSY, LEFT BREAST SHOWING INTERMEDIATE GRADE DCIS AND INVASIVE DUCT CARCINOMA  (3, 2, 1) WITH MICROCALCIFICATIONS PRESENT. THE LARGEST LINEAR MEASUREMENT OF INVASIVE  CARCINOMA IS APPROXIMATELY 5 MM. THE RESULTS OF IMMUNE RECEPTOR STUDIES ARE PENDING  WITH REPORT TO FOLLOW      Supplemental Diagnosis  THE RESULTS OF IMMUNE RECEPTOR STUDIES ARE AS FOLLOWS: ER IS POSITIVE WITH BETWEEN 90  % OF TUMOR CELL NUCLEI STAINING STRONGLY. OH IS POSITIVE WITH BETWEEN 30-40% OF  TUMOR CELL NUCLEI STAINING STRONGLY. IMMUNOSTAIN FOR HER-2/RAEANN IS EQUIVOCAL AND I WILL  SEND THIS TISSUE BLOCK OUT FOR FISH ANALYSIS. THE POSITIVE AND NEGATIVE CONTROLS STAIN  APPROPRIATELY.     Change was noted 6 months ago. Patient does not routinely do self breast exams.  Patient has noted a change on breast exam. Breast cancer risk factors include none.   Age of menarche was 9.  Last menstrual period was  3/23/17. Patient admits to hormonal therapy. Patient is .  Patient denies nipple discharge. Patient denies to previous breast biopsy. Patient denies a personal history of breast cancer.      Review of Systems   Constitutional: Negative for appetite change, chills, diaphoresis, fatigue, fever and unexpected weight change.   HENT: Negative for hearing loss, sore throat, trouble swallowing and voice change.    Eyes: Negative for visual disturbance.   Respiratory: Negative for cough,  shortness of breath and wheezing.    Cardiovascular: Negative for chest pain, palpitations and leg swelling.   Gastrointestinal: Negative for abdominal distention, abdominal pain, anal bleeding, blood in stool, constipation, diarrhea, nausea, rectal pain and vomiting.   Genitourinary: Negative for difficulty urinating, dysuria, flank pain, frequency, hematuria, menstrual problem and urgency.   Musculoskeletal: Negative for arthralgias, back pain, joint swelling, myalgias and neck pain.   Skin: Negative for pallor and rash.   Neurological: Negative for dizziness, syncope, weakness and headaches.   Hematological: Negative for adenopathy. Does not bruise/bleed easily.   Psychiatric/Behavioral: Negative for suicidal ideas. The patient is not nervous/anxious.        Objective:      Physical Exam   Constitutional: She is oriented to person, place, and time. She appears well-developed and well-nourished. No distress.   HENT:   Head: Normocephalic and atraumatic.   Right Ear: External ear normal.   Left Ear: External ear normal.   Eyes: Conjunctivae are normal. Pupils are equal, round, and reactive to light. Right eye exhibits no discharge. Left eye exhibits no discharge.   Neck: No tracheal deviation present. No thyromegaly present.   Cardiovascular: Normal rate, regular rhythm and normal heart sounds.    Pulmonary/Chest: Effort normal and breath sounds normal. No respiratory distress. Right breast exhibits no inverted nipple, no mass, no nipple discharge, no skin change and no tenderness. Left breast exhibits mass and skin change (echymoses). Left breast exhibits no inverted nipple, no nipple discharge and no tenderness.       Musculoskeletal: She exhibits no edema or tenderness.   Lymphadenopathy:     She has no cervical adenopathy.     She has no axillary adenopathy.   Neurological: She is alert and oriented to person, place, and time. No cranial nerve deficit.   Skin: Skin is warm and dry. No rash noted. She is not  diaphoretic. No pallor.   Psychiatric: She has a normal mood and affect. Her behavior is normal. Judgment and thought content normal.       Assessment:       1. Invasive ductal carcinoma of breast, left    2. Ductal carcinoma in situ (DCIS) of left breast        Plan:       Case discussed with Dr. Gaming who will see patient in consult this week.  Treatment options discussed - lumpectomy/radiation vs mastectomy +/- reconstruction, SLN biopsy, induction chemotherapy (defer to Dr. CAMPO).  PET CT ordered.  Await Dr. Gaming's consultation.  All questions were answered.

## 2017-04-27 NOTE — OP NOTE
DATE: 4/27/2017    PRE OP DX: Insufficient venous access for chemotherapy    POST OF DX: Same    PROCEDURE: Insertion of right Subclavian PowerPort    SURGEON: Jasiel Sapp M.D.    ANESTHESIA: General    PROCEDURE AND FINDINGS:  With the patient in the supine trendelenburg position under satisfactory anesthesia the chest and neck were prepped and draped in the usual manner for port insertion.  The skin and subcutaneous tissue in the right subclavicular area was infiltrated with 0.25% Marcaine w/epinephrine.  Venepuncture was performed into the right subclavian vein and guide wire was advanced under fluoroscopic control into the SVC.    Skin incision was made and pocked created for the Power port.  The catheter was cut to 15 cm in length and connected to the port in the standard manner.  It was flushed with heparinized saline.  The catheter was introduced using the supplied peel-away sheath introducer and advanced to the SVC-RA junction using fluoroscopy..  Good flow was noted in both directions.  The port was sutured to the pectoralis fascia using 2-0 vicryl sutures.  Hemostasis was secure and the subcutaneous tissue was approximated with 3-0 vicryl.  The skin incision was closed with a 4-0 monocryl subcuticular stitch.  Steri strips and sterile dressing was applied.    Patient tolerated the procedure well and was sent to recovery room in satisfactory condition.    EBL: 10 cc's    Complications: none    Drains: none    Specimens: none

## 2017-04-27 NOTE — TRANSFER OF CARE
"Anesthesia Transfer of Care Note    Patient: Berto Chun    Procedure(s) Performed: Procedure(s) (LRB):  [id number] (N/A)    Patient location: PACU    Anesthesia Type: general    Transport from OR: Transported from OR on 2-3 L/min O2 by NC with adequate spontaneous ventilation    Post pain: adequate analgesia    Post assessment: no apparent anesthetic complications and tolerated procedure well    Post vital signs: stable    Level of consciousness: awake, alert and oriented    Nausea/Vomiting: no nausea/vomiting    Complications: none          Last vitals:   Visit Vitals    /80 (BP Location: Left arm, Patient Position: Lying, BP Method: Automatic)    Pulse 79    Temp 36.9 °C (98.4 °F) (Oral)    Resp 18    Ht 5' 3" (1.6 m)    Wt 70.3 kg (155 lb)    LMP 04/21/2017    SpO2 99%    Breastfeeding No    BMI 27.46 kg/m2     "

## 2017-04-27 NOTE — PLAN OF CARE
Pt. D/c per dr's orders, IV removed catheter tip intact.  Pt. And  verbalized understanding of instructions and education.  No pain, no nausea, DDI to right upper chest.  Pt. Wheeled to the car via w/c and left with .  Port pack sent with pt.  Erinn Henderson

## 2017-04-27 NOTE — DISCHARGE INSTRUCTIONS
Vascular Access Port Implantation   Port implantation is surgery to place (implant) a port under the skin. For vascular access, it is placed into a vein. The port allows medicines or nutrition to be sent right into your bloodstream. Blood can also be taken or given through the port. During the procedure, a long, thin tube called a catheter is threaded into one of your large veins. The tube is then attached to the port. This usually sits under the skin of your chest and causes a small bump. To use the port, a special needle is passed through your skin and into the port. The needle can stay in your skin for up to 7 days, if needed. A port can stay in place for weeks or months or longer.    Why is a vascular access port needed?  A vascular access port may allow healthcare providers to give you:  · Chemotherapy or other cancer-fighting drugs  · IV treatments, such as antibiotics or nutrition  · Hemodialysis (for kidney failure)  The port may also be used to draw blood.  Before the procedure  Follow any instructions you are given on how to prepare.  Tell your provider about any medicines you are taking. This includes:  · All prescription medicines  · Over-the-counter medicines such as aspirin or ibuprofen  · Herbs, vitamins, and other supplements  Also be sure your provider knows:  · If you are pregnant or think you may be pregnant  · If you are allergic to any medicines or substances, especially local anesthetics or iodine  · Your full medical history, including why you will need the port  · If you plan on doing any contact sports  During the procedure  · Before the procedure, an IV may be put into a vein in your arm or hand. This gives you fluids and medicines. You may be given medicine through the IV to help you relax during the procedure. This is called sedation. But some surgeons place ports using general anesthesia.  · The chest is used most often for the port. In some cases, your belly (abdomen) or arm will be  used instead.  · The skin over the insertion area is numbed with local anesthetic.  · Ultrasound or X-rays are used to help the healthcare provider guide the catheter into the proper location during the procedure.  · A cut (incision) is made in the skin where the port will be placed. A small pocket for the port is formed under the skin.  · A second small incision is made in the skin near the first incision. A tunnel under the skin is created. The catheter is put through the tunnel and into the blood vessel.  · The skin is closed over the port. It is held shut with stitches (sutures) or surgical glue or tape. The second small incision is also closed.  · A chest X-ray may be done to make sure the port is placed properly.  After the procedure  You may be taken to a recovery room where youll recover from the sedation. Nurses will check on you as you rest. If you have pain, nurses can give you medicine. If you are not staying in the hospital overnight, you will be sent home a few hours after the procedure is done. A healthcare provider will tell you when you can go home. An adult family member or friend will need to drive you home.  Recovering at home  · Take pain medicine as directed by your healthcare provider.  · Take it easy for 24 hours after the procedure. Avoid physical activity and heavy lifting until your healthcare provider says its OK.  · Keep the port clean and dry. Ask when you can shower again. You will need to keep the port dry by covering it when you shower.  · Care for the insertion site as you are directed.  · Dont swim, bathe, or do other activities that cause water to cover the insertion site.  · To keep the port from getting blocked with blood clots, flush it as often as directed. You should be shown the proper way to flush the port before you go home. It is important to follow these directions.     Risks and possible complications of implantation  · Bleeding  · Infection of the insertion  site  · Damage to a blood vessel  · Nerve injury or irritation  · Collapsed lung (for chest port placements)  · Skin breakdown over the port  Risks and possible complications of having a port  · Blocked  port or catheter  · Leakage or breakage of the port or catheter  · The port moves out of position  · Blood clot  · Skin or bloodstream infection  · Skin breakdown over the port      When to seek medical care  Call your healthcare provider right away if you have any of the following:  · A fever of 100.4°F (38.0°C) or higher  · You can't access or use the port properly  · You can't flush the port or get a blood return  · The skin near the port is red, warm, swollen, or broken  · You have shoulder pain on the side where the port is located  · You feel a heart flutter or racing heart   · Swollen arm, if the port is placed in your arm   Date Last Reviewed: 7/1/2016  © 8136-2564 [employer]. [address] [place] [address]. All rights reserved. This information is not intended as a substitute for professional medical care. Always follow your healthcare professional's instructions.      General Information:    1.  Do not drink alcoholic beverages including beer for 24 hours or as long as you are on pain medication..  2.  Do not drive a motor vehicle, operate machinery or power tools, or signs legal papers for 24 hours or as long as you are on pain medication.   3.  You may experience light-headedness, dizziness, and sleepiness following surgery. Please do not stay alone. A responsible adult should be with you for this 24 hour period.  4.  Go home and rest.    5. Progress slowly to a normal diet unless instructed.  Otherwise, begin with liquids such as soft drinks, then soup and crackers working up to solid foods. Drink plenty of nonalcoholic fluids.  6.  Certain anesthetics and pain medications produce nausea and vomiting in certain       individuals. If nausea becomes a problem at home, call you  "doctor.    7. A nurse will be calling you sometime after surgery. Do not be alarmed. This is our way of finding out how you are doing.    8. Several times every hour while you are awake, take 2-3 deep breaths and cough. If you had stomach surgery hold a pillow or rolled towel firmly against your stomach before you cough. This will help with any pain the cough might cause.  9. Several times every hour while you are awake, pump and flex your feet 5-6 times and do foot circles. This will help prevent blood clots.    10.Call your doctor for severe pain, bleeding, fever, or signs or symptoms of infection (pain, swelling, redness, foul odor, drainage).    Discharge Instructions: After Your Surgery/Procedure  Youve just had surgery. During surgery you were given medicine called anesthesia to keep you relaxed and free of pain. After surgery you may have some pain or nausea. This is common. Here are some tips for feeling better and getting well after surgery.     Stay on schedule with your medication.   Going home  Your doctor or nurse will show you how to take care of yourself when you go home. He or she will also answer your questions. Have an adult family member or friend drive you home.      For your safety we recommend these precaution for the first 24 hours after your procedure:  · Do not drive or use heavy equipment.  · Do not make important decisions or sign legal papers.  · Do not drink alcohol.  · Have someone stay with you, if needed. He or she can watch for problems and help keep you safe.  · Your concentration, balance, coordination, and judgement may be impaired for many hours after anesthesia.  Use caution when ambulating or standing up.     · You may feel weak and "washed out" after anesthesia and surgery.      Subtle residual effects of general anesthesia or sedation with regional / local anesthesia can last more than 24 hours.  Rest for the remainder of the day or longer if your Doctor/Surgeon has advised " you to do so.  Although you may feel normal within the first 24 hours, your reflexes and mental ability may be impaired without you realizing it.  You may feel dizzy, lightheaded or sleepy for 24 hours or longer.      Be sure to go to all follow-up visits with your doctor. And rest after your surgery for as long as your doctor tells you to.  Coping with pain  If you have pain after surgery, pain medicine will help you feel better. Take it as told, before pain becomes severe. Also, ask your doctor or pharmacist about other ways to control pain. This might be with heat, ice, or relaxation. And follow any other instructions your surgeon or nurse gives you.  Tips for taking pain medicine  To get the best relief possible, remember these points:  · Pain medicines can upset your stomach. Taking them with a little food may help.  · Most pain relievers taken by mouth need at least 20 to 30 minutes to start to work.  · Taking medicine on a schedule can help you remember to take it. Try to time your medicine so that you can take it before starting an activity. This might be before you get dressed, go for a walk, or sit down for dinner.  · Constipation is a common side effect of pain medicines. Call your doctor before taking any medicines such as laxatives or stool softeners to help ease constipation. Also ask if you should skip any foods. Drinking lots of fluids and eating foods such as fruits and vegetables that are high in fiber can also help. Remember, do not take laxatives unless your surgeon has prescribed them.  · Drinking alcohol and taking pain medicine can cause dizziness and slow your breathing. It can even be deadly. Do not drink alcohol while taking pain medicine.  · Pain medicine can make you react more slowly to things. Do not drive or run machinery while taking pain medicine.  Your health care provider may tell you to take acetaminophen to help ease your pain. Ask him or her how much you are supposed to take  each day. Acetaminophen or other pain relievers may interact with your prescription medicines or other over-the-counter (OTC) drugs. Some prescription medicines have acetaminophen and other ingredients. Using both prescription and OTC acetaminophen for pain can cause you to overdose. Read the labels on your OTC medicines with care. This will help you to clearly know the list of ingredients, how much to take, and any warnings. It may also help you not take too much acetaminophen. If you have questions or do not understand the information, ask your pharmacist or health care provider to explain it to you before you take the OTC medicine.  Managing nausea  Some people have an upset stomach after surgery. This is often because of anesthesia, pain, or pain medicine, or the stress of surgery. These tips will help you handle nausea and eat healthy foods as you get better. If you were on a special food plan before surgery, ask your doctor if you should follow it while you get better. These tips may help:  · Do not push yourself to eat. Your body will tell you when to eat and how much.  · Start off with clear liquids and soup. They are easier to digest.  · Next try semi-solid foods, such as mashed potatoes, applesauce, and gelatin, as you feel ready.  · Slowly move to solid foods. Dont eat fatty, rich, or spicy foods at first.  · Do not force yourself to have 3 large meals a day. Instead eat smaller amounts more often.  · Take pain medicines with a small amount of solid food, such as crackers or toast, to avoid nausea.     Call your surgeon if  · You still have pain an hour after taking medicine. The medicine may not be strong enough.  · You feel too sleepy, dizzy, or groggy. The medicine may be too strong.  · You have side effects like nausea, vomiting, or skin changes, such as rash, itching, or hives.       If you have obstructive sleep apnea  You were given anesthesia medicine during surgery to keep you comfortable and  free of pain. After surgery, you may have more apnea spells because of this medicine and other medicines you were given. The spells may last longer than usual.   At home:  · Keep using the continuous positive airway pressure (CPAP) device when you sleep. Unless your health care provider tells you not to, use it when you sleep, day or night. CPAP is a common device used to treat obstructive sleep apnea.  · Talk with your provider before taking any pain medicine, muscle relaxants, or sedatives. Your provider will tell you about the possible dangers of taking these medicines.  © 9271-0816 [employer]. [address] [place] [address], [place] [address]. All rights reserved. This information is not intended as a substitute for professional medical care. Always follow your healthcare professional's instructions.  Using Opioids for Pain Management     Your doctor has given instructions for you to take an opioid.  This is a drug for bad pain.  It helps control pain without causing bleeding and kidney problems.  Common opioid names are morphine, hydromorphone, oxycodone, and methadone. These drugs are called narcotics.    There are several safety concerns you need to know.     · It is against the law to give or sell this drug to another person.  You must keep this medicine safely locked.    · You may have side effects from taking this medication.  These include nausea, itching, sweating, sleepiness, a change in your ability to breathe, and depression.  · Do not take alcohol or sleeping pills opioids.    · Long-term opoid use may no longer giver you relief from pain.  It can cause you stomach pain, mental anxiety, and headaches.  Long-term opoid use can potentially lead to unlawful street drug abuse and reduce your ability to stay employed.    · Your body may become opioid tolerant if you need to take more to get relief.    · You must stop taking opioids if you begin having more pain as a result of the  medicine.    · Opioid withdrawal occurs when you have to stop taking the drug.  It can cause you to have nausea, vomiting, diarrhea, stomach pain, anxiety, and dilated pupils in your eyes. This condition means you are opioid dependent.    · Addiction is a drug induced brain disease. It means there are changes in how your brain is working.  Children, teens, and young adults under 25 years old are more likely to get addicted to opioids.      · Addiction can happen with repeated opioid use.  It does not happen with short-term use of two weeks or less.    For more information, please speak with your doctor or pharmacist.        We hope your stay was comfortable as you heal now, mend and rest.    For we have enjoyed taking care of you by giving your our best.    And as you get better, by regaining your health and strength;   We count it as a privilege to have served you and hope your time at [facility] was well spent.      Thank  You!!!

## 2017-04-27 NOTE — IP AVS SNAPSHOT
[address] [place] [address]  Phone: [phone]           Patient Discharge Instructions   Our goal is to set you up for success. This packet includes information on your condition, medications, and your home care.  It will help you care for yourself to prevent having to return to the hospital.     Please ask your nurse if you have any questions.      There are many details to remember when preparing to leave the hospital. Here is what you will need to do:    1. Take your medicine. If you are prescribed medications, review your Medication List on the following pages. You may have new medications to  at the pharmacy and others that you'll need to stop taking. Review the instructions for how and when to take your medications. Talk with your doctor or nurses if you are unsure of what to do.     2. Go to your follow-up appointments. Specific follow-up information is listed in the following pages. Your may be contacted by a nurse or clinical provider about future appointments. Be sure we have all of the phone numbers to reach you. Please contact your provider's office if you are unable to make an appointment.     3. Watch for warning signs. Your doctor or nurse will give you detailed warning signs to watch for and when to call for assistance. These instructions may also include educational information about your condition. If you experience any of warning signs to your health, call your doctor.           [facility]  Unless otherwise directed by your provider, please   contact [facility], our nurse care line   that is available for 24/7 assistance.     [phone] (toll-free)     Registered nurses in the [facility]   provide: appointment scheduling, clinical advisement, health education, and other advisory services.                  ** Verify the list of medication(s) below is accurate and up to date. Carry this with you in case of  emergency. If your medications have changed, please notify your healthcare provider.             Medication List      START taking these medications        Additional Info                      hydrocodone-acetaminophen 5-325mg 5-325 mg per tablet   Commonly known as:  NORCO   Quantity:  15 tablet   Refills:  0   Dose:  1 tablet    Instructions:  Take 1 tablet by mouth every 4 (four) hours as needed for Pain.     Begin Date    AM    Noon    PM    Bedtime         CONTINUE taking these medications        Additional Info                      clindamycin-tretinoin gel   Commonly known as:  ZIANA   Refills:  0    Instructions:  Apply a thin coat to the affected area every night.     Begin Date    AM    Noon    PM    Bedtime       ONE A DAY WOMEN'S PRENATAL DHA ORAL   Refills:  0   Dose:  1 tablet    Instructions:  Take 1 tablet by mouth once daily.     Begin Date    AM    Noon    PM    Bedtime            Where to Get Your Medications      These medications were sent to [facility] [address] AT [place] [address] & [address] [address]     Phone:  [phone]     hydrocodone-acetaminophen 5-325mg 5-325 mg per tablet                  Please bring to all follow up appointments:    1. A copy of your discharge instructions.  2. All medicines you are currently taking in their original bottles.  3. Identification and insurance card.    Please arrive 15 minutes ahead of scheduled appointment time.    Please call 24 hours in advance if you must reschedule your appointment and/or time.        Your Scheduled Appointments     Apr 28, 2017 11:40 AM CDT   Established Patient Visit with Yusuf Gaming MD   [facility] ([facility])    Formerly named [facility] [address]  [facility] [id number] [phone]            May 02, 2017  8:30 AM CDT   Us Biopsy Lymph Nodes with [facility] US2   [facility] ([facility])    [address] [facility]  [address]  [place] [address] [phone]              Follow-up Information     Call Jasiel Sapp MD.    Specialties:  General Surgery, Surgery    Why:  As needed    Contact information:    [address]  [place] [address]  [phone]          Discharge Instructions     Future Orders    Activity as tolerated     Call MD for:  difficulty breathing, headache or visual disturbances     Call MD for:  extreme fatigue     Call MD for:  hives     Call MD for:  persistent dizziness or light-headedness     Call MD for:  persistent nausea and vomiting     Call MD for:  redness, tenderness, or signs of infection (pain, swelling, redness, odor or green/yellow discharge around incision site)     Call MD for:  severe uncontrolled pain     Call MD for:  temperature >100.4     Diet general     Questions:    Total calories:      Fat restriction, if any:      Protein restriction, if any:      Na restriction, if any:      Fluid restriction:      Additional restrictions:      Lifting restrictions     Shower on day dressing removed (No bath)         Discharge Instructions         Vascular Access Port Implantation   Port implantation is surgery to place (implant) a port under the skin. For vascular access, it is placed into a vein. The port allows medicines or nutrition to be sent right into your bloodstream. Blood can also be taken or given through the port. During the procedure, a long, thin tube called a catheter is threaded into one of your large veins. The tube is then attached to the port. This usually sits under the skin of your chest and causes a small bump. To use the port, a special needle is passed through your skin and into the port. The needle can stay in your skin for up to 7 days, if needed. A port can stay in place for weeks or months or longer.    Why is a vascular access port needed?  A vascular access port may allow healthcare providers to give you:  · Chemotherapy or other cancer-fighting drugs  · IV  treatments, such as antibiotics or nutrition  · Hemodialysis (for kidney failure)  The port may also be used to draw blood.  Before the procedure  Follow any instructions you are given on how to prepare.  Tell your provider about any medicines you are taking. This includes:  · All prescription medicines  · Over-the-counter medicines such as aspirin or ibuprofen  · Herbs, vitamins, and other supplements  Also be sure your provider knows:  · If you are pregnant or think you may be pregnant  · If you are allergic to any medicines or substances, especially local anesthetics or iodine  · Your full medical history, including why you will need the port  · If you plan on doing any contact sports  During the procedure  · Before the procedure, an IV may be put into a vein in your arm or hand. This gives you fluids and medicines. You may be given medicine through the IV to help you relax during the procedure. This is called sedation. But some surgeons place ports using general anesthesia.  · The chest is used most often for the port. In some cases, your belly (abdomen) or arm will be used instead.  · The skin over the insertion area is numbed with local anesthetic.  · Ultrasound or X-rays are used to help the healthcare provider guide the catheter into the proper location during the procedure.  · A cut (incision) is made in the skin where the port will be placed. A small pocket for the port is formed under the skin.  · A second small incision is made in the skin near the first incision. A tunnel under the skin is created. The catheter is put through the tunnel and into the blood vessel.  · The skin is closed over the port. It is held shut with stitches (sutures) or surgical glue or tape. The second small incision is also closed.  · A chest X-ray may be done to make sure the port is placed properly.  After the procedure  You may be taken to a recovery room where youll recover from the sedation. Nurses will check on you as you  rest. If you have pain, nurses can give you medicine. If you are not staying in the hospital overnight, you will be sent home a few hours after the procedure is done. A healthcare provider will tell you when you can go home. An adult family member or friend will need to drive you home.  Recovering at home  · Take pain medicine as directed by your healthcare provider.  · Take it easy for 24 hours after the procedure. Avoid physical activity and heavy lifting until your healthcare provider says its OK.  · Keep the port clean and dry. Ask when you can shower again. You will need to keep the port dry by covering it when you shower.  · Care for the insertion site as you are directed.  · Dont swim, bathe, or do other activities that cause water to cover the insertion site.  · To keep the port from getting blocked with blood clots, flush it as often as directed. You should be shown the proper way to flush the port before you go home. It is important to follow these directions.     Risks and possible complications of implantation  · Bleeding  · Infection of the insertion site  · Damage to a blood vessel  · Nerve injury or irritation  · Collapsed lung (for chest port placements)  · Skin breakdown over the port  Risks and possible complications of having a port  · Blocked  port or catheter  · Leakage or breakage of the port or catheter  · The port moves out of position  · Blood clot  · Skin or bloodstream infection  · Skin breakdown over the port      When to seek medical care  Call your healthcare provider right away if you have any of the following:  · A fever of 100.4°F (38.0°C) or higher  · You can't access or use the port properly  · You can't flush the port or get a blood return  · The skin near the port is red, warm, swollen, or broken  · You have shoulder pain on the side where the port is located  · You feel a heart flutter or racing heart   · Swollen arm, if the port is placed in your arm   Date Last Reviewed:  7/1/2016 © 2000-2016 [employer]. [address] [place] [address], [place] [address]. All rights reserved. This information is not intended as a substitute for professional medical care. Always follow your healthcare professional's instructions.      General Information:    1.  Do not drink alcoholic beverages including beer for 24 hours or as long as you are on pain medication..  2.  Do not drive a motor vehicle, operate machinery or power tools, or signs legal papers for 24 hours or as long as you are on pain medication.   3.  You may experience light-headedness, dizziness, and sleepiness following surgery. Please do not stay alone. A responsible adult should be with you for this 24 hour period.  4.  Go home and rest.    5. Progress slowly to a normal diet unless instructed.  Otherwise, begin with liquids such as soft drinks, then soup and crackers working up to solid foods. Drink plenty of nonalcoholic fluids.  6.  Certain anesthetics and pain medications produce nausea and vomiting in certain       individuals. If nausea becomes a problem at home, call you doctor.    7. A nurse will be calling you sometime after surgery. Do not be alarmed. This is our way of finding out how you are doing.    8. Several times every hour while you are awake, take 2-3 deep breaths and cough. If you had stomach surgery hold a pillow or rolled towel firmly against your stomach before you cough. This will help with any pain the cough might cause.  9. Several times every hour while you are awake, pump and flex your feet 5-6 times and do foot circles. This will help prevent blood clots.    10.Call your doctor for severe pain, bleeding, fever, or signs or symptoms of infection (pain, swelling, redness, foul odor, drainage).    Discharge Instructions: After Your Surgery/Procedure  Youve just had surgery. During surgery you were given medicine called anesthesia to keep you relaxed and free of pain. After surgery you may have some  "pain or nausea. This is common. Here are some tips for feeling better and getting well after surgery.     Stay on schedule with your medication.   Going home  Your doctor or nurse will show you how to take care of yourself when you go home. He or she will also answer your questions. Have an adult family member or friend drive you home.      For your safety we recommend these precaution for the first 24 hours after your procedure:  · Do not drive or use heavy equipment.  · Do not make important decisions or sign legal papers.  · Do not drink alcohol.  · Have someone stay with you, if needed. He or she can watch for problems and help keep you safe.  · Your concentration, balance, coordination, and judgement may be impaired for many hours after anesthesia.  Use caution when ambulating or standing up.     · You may feel weak and "washed out" after anesthesia and surgery.      Subtle residual effects of general anesthesia or sedation with regional / local anesthesia can last more than 24 hours.  Rest for the remainder of the day or longer if your Doctor/Surgeon has advised you to do so.  Although you may feel normal within the first 24 hours, your reflexes and mental ability may be impaired without you realizing it.  You may feel dizzy, lightheaded or sleepy for 24 hours or longer.      Be sure to go to all follow-up visits with your doctor. And rest after your surgery for as long as your doctor tells you to.  Coping with pain  If you have pain after surgery, pain medicine will help you feel better. Take it as told, before pain becomes severe. Also, ask your doctor or pharmacist about other ways to control pain. This might be with heat, ice, or relaxation. And follow any other instructions your surgeon or nurse gives you.  Tips for taking pain medicine  To get the best relief possible, remember these points:  · Pain medicines can upset your stomach. Taking them with a little food may help.  · Most pain relievers taken by " mouth need at least 20 to 30 minutes to start to work.  · Taking medicine on a schedule can help you remember to take it. Try to time your medicine so that you can take it before starting an activity. This might be before you get dressed, go for a walk, or sit down for dinner.  · Constipation is a common side effect of pain medicines. Call your doctor before taking any medicines such as laxatives or stool softeners to help ease constipation. Also ask if you should skip any foods. Drinking lots of fluids and eating foods such as fruits and vegetables that are high in fiber can also help. Remember, do not take laxatives unless your surgeon has prescribed them.  · Drinking alcohol and taking pain medicine can cause dizziness and slow your breathing. It can even be deadly. Do not drink alcohol while taking pain medicine.  · Pain medicine can make you react more slowly to things. Do not drive or run machinery while taking pain medicine.  Your health care provider may tell you to take acetaminophen to help ease your pain. Ask him or her how much you are supposed to take each day. Acetaminophen or other pain relievers may interact with your prescription medicines or other over-the-counter (OTC) drugs. Some prescription medicines have acetaminophen and other ingredients. Using both prescription and OTC acetaminophen for pain can cause you to overdose. Read the labels on your OTC medicines with care. This will help you to clearly know the list of ingredients, how much to take, and any warnings. It may also help you not take too much acetaminophen. If you have questions or do not understand the information, ask your pharmacist or health care provider to explain it to you before you take the OTC medicine.  Managing nausea  Some people have an upset stomach after surgery. This is often because of anesthesia, pain, or pain medicine, or the stress of surgery. These tips will help you handle nausea and eat healthy foods as you get  better. If you were on a special food plan before surgery, ask your doctor if you should follow it while you get better. These tips may help:  · Do not push yourself to eat. Your body will tell you when to eat and how much.  · Start off with clear liquids and soup. They are easier to digest.  · Next try semi-solid foods, such as mashed potatoes, applesauce, and gelatin, as you feel ready.  · Slowly move to solid foods. Dont eat fatty, rich, or spicy foods at first.  · Do not force yourself to have 3 large meals a day. Instead eat smaller amounts more often.  · Take pain medicines with a small amount of solid food, such as crackers or toast, to avoid nausea.     Call your surgeon if  · You still have pain an hour after taking medicine. The medicine may not be strong enough.  · You feel too sleepy, dizzy, or groggy. The medicine may be too strong.  · You have side effects like nausea, vomiting, or skin changes, such as rash, itching, or hives.       If you have obstructive sleep apnea  You were given anesthesia medicine during surgery to keep you comfortable and free of pain. After surgery, you may have more apnea spells because of this medicine and other medicines you were given. The spells may last longer than usual.   At home:  · Keep using the continuous positive airway pressure (CPAP) device when you sleep. Unless your health care provider tells you not to, use it when you sleep, day or night. CPAP is a common device used to treat obstructive sleep apnea.  · Talk with your provider before taking any pain medicine, muscle relaxants, or sedatives. Your provider will tell you about the possible dangers of taking these medicines.  © 1798-6444 [employer]. [address] [place] [address], [place] [address]. All rights reserved. This information is not intended as a substitute for professional medical care. Always follow your healthcare professional's instructions.  Using Opioids for Pain Management     Your  doctor has given instructions for you to take an opioid.  This is a drug for bad pain.  It helps control pain without causing bleeding and kidney problems.  Common opioid names are morphine, hydromorphone, oxycodone, and methadone. These drugs are called narcotics.    There are several safety concerns you need to know.     · It is against the law to give or sell this drug to another person.  You must keep this medicine safely locked.    · You may have side effects from taking this medication.  These include nausea, itching, sweating, sleepiness, a change in your ability to breathe, and depression.  · Do not take alcohol or sleeping pills opioids.    · Long-term opoid use may no longer giver you relief from pain.  It can cause you stomach pain, mental anxiety, and headaches.  Long-term opoid use can potentially lead to unlawful street drug abuse and reduce your ability to stay employed.    · Your body may become opioid tolerant if you need to take more to get relief.    · You must stop taking opioids if you begin having more pain as a result of the medicine.    · Opioid withdrawal occurs when you have to stop taking the drug.  It can cause you to have nausea, vomiting, diarrhea, stomach pain, anxiety, and dilated pupils in your eyes. This condition means you are opioid dependent.    · Addiction is a drug induced brain disease. It means there are changes in how your brain is working.  Children, teens, and young adults under 25 years old are more likely to get addicted to opioids.      · Addiction can happen with repeated opioid use.  It does not happen with short-term use of two weeks or less.    For more information, please speak with your doctor or pharmacist.        We hope your stay was comfortable as you heal now, mend and rest.    For we have enjoyed taking care of you by giving your our best.    And as you get better, by regaining your health and strength;   We count it as a privilege to have served you and  "hope your time at [facility] was well spent.      Thank  You!!!        Primary Diagnosis     Your primary diagnosis was:  Breast Cancer      Admission Information     Date & Time Provider Department CSN    4/27/2017  8:52 AM Jasiel Sapp MD [facility] [id number]      Care Providers     Provider Role Specialty Primary office phone    Jasiel Sapp MD Attending Provider General Surgery [phone]    Jasiel Sapp MD Surgeon  General Surgery [phone]      Your Vitals Were     BP Pulse Temp Resp Height Weight    112/73 83 98.1 °F (36.7 °C) (Temporal) 16 5' 3" (1.6 m) 70.3 kg (155 lb)    Last Period SpO2 BMI          04/21/2017 100% 27.46 kg/m2        Recent Lab Values     No lab values to display.      Allergies as of 4/27/2017     No Known Allergies      Advance Directives     An advance directive is a document which, in the event you are no longer able to make decisions for yourself, tells your healthcare team what kind of treatment you do or do not want to receive, or who you would like to make those decisions for you.  If you do not currently have an advance directive, [facility] encourages you to create one.  For more information call:  [phone]), [phone] ([phone]),  or log on to [email or web address].        Language Assistance Services     ATTENTION: Language assistance services are available, free of charge. Please call [phone].      ATENCIÓN: Si habla español, tiene a bui disposición servicios gratuitos de asistencia lingüística. Llame al [phone].     [facility] Ý: N?u b?n nói Ti?ng Vi?t, có các d?ch v? h? tr? ngôn ng? mi?n phí dành cho b?n. G?i s? [phone].         [facility] complies with applicable Federal civil rights laws and does not discriminate on the basis of race, color, national origin, age, disability, or sex.        "

## 2017-04-27 NOTE — ANESTHESIA POSTPROCEDURE EVALUATION
"Anesthesia Post Evaluation    Patient: Berto Chun    Procedure(s) Performed: Procedure(s) (LRB):  [id number] (N/A)    Final Anesthesia Type: general  Patient location during evaluation: PACU  Patient participation: Yes- Able to Participate  Level of consciousness: awake and alert  Post-procedure vital signs: reviewed and stable  Pain management: adequate  Airway patency: patent  PONV status at discharge: No PONV  Anesthetic complications: no      Cardiovascular status: blood pressure returned to baseline and hemodynamically stable  Respiratory status: unassisted  Hydration status: euvolemic  Follow-up not needed.        Visit Vitals    /69    Pulse 102    Temp 36.7 °C (98.1 °F) (Temporal)    Resp 20    Ht 5' 3" (1.6 m)    Wt 70.3 kg (155 lb)    LMP 04/21/2017    SpO2 97%    Breastfeeding No    BMI 27.46 kg/m2       Pain/Nadja Score: Pain Assessment Performed: Yes (4/27/2017  9:42 AM)  Presence of Pain: denies (4/27/2017  9:42 AM)      "

## 2017-04-27 NOTE — DISCHARGE SUMMARY
Discharge Summary  General Surgery      Admit Date: 4/27/2017    Discharge Date :4/27/2017    Attending Physician: Jasiel Sapp     Discharge Physician: Jasiel Sapp    Discharged Condition: good    Discharge Diagnosis: Invasive ductal carcinoma of breast, left [C50.912]    Treatments/Procedures: Procedure(s) (LRB):  [id number] (N/A)    Hospital Course: Uneventful; Discharged home from Recovery    Significant Diagnostic Studies: none    Disposition: Home or Self Care    Diet: Regular    Follow up: Office 10-14 days    Activity: No heavy lifting till seen in office.    Patient Instructions:   Current Discharge Medication List      START taking these medications    Details   hydrocodone-acetaminophen 5-325mg (NORCO) 5-325 mg per tablet Take 1 tablet by mouth every 4 (four) hours as needed for Pain.  Qty: 15 tablet, Refills: 0         CONTINUE these medications which have NOT CHANGED    Details   clindamycin-tretinoin (ZIANA) gel Apply a thin coat to the affected area every night.      PNV#75/IRON FUM/FA/OM3/DHA/EPA (ONE A DAY WOMEN'S PRENATAL DHA ORAL) Take 1 tablet by mouth once daily.               Discharge Procedure Orders  Diet general     Activity as tolerated     Shower on day dressing removed (No bath)     Lifting restrictions     Call MD for:  temperature >100.4     Call MD for:  persistent nausea and vomiting     Call MD for:  severe uncontrolled pain     Call MD for:  difficulty breathing, headache or visual disturbances     Call MD for:  redness, tenderness, or signs of infection (pain, swelling, redness, odor or green/yellow discharge around incision site)     Call MD for:  hives     Call MD for:  persistent dizziness or light-headedness     Call MD for:  extreme fatigue

## 2017-04-27 NOTE — INTERVAL H&P NOTE
The patient has been examined and the H&P has been reviewed:    I concur with the findings and changes have been noted since the H&P was written: PET CT suggestive of positive left axillary node.  Saw Dr. Gaming and will have chemo prior to surgical intervention.  For port placement today.   All aspects of procedure including risks and possible complications were discussed in detail with the patient who agrees to proceed with procedure.  All questions were answered and consent was obtained. Preop orders were written.      Anesthesia/Surgery risks, benefits and alternative options discussed and understood by patient/family.          Active Hospital Problems    Diagnosis  POA    Invasive ductal carcinoma of breast [C50.919]  Yes      Resolved Hospital Problems    Diagnosis Date Resolved POA   No resolved problems to display.

## 2017-04-28 ENCOUNTER — OFFICE VISIT (OUTPATIENT)
Dept: HEMATOLOGY/ONCOLOGY | Facility: CLINIC | Age: 27
End: 2017-04-28
Payer: COMMERCIAL

## 2017-04-28 ENCOUNTER — TELEPHONE (OUTPATIENT)
Dept: PHARMACY | Facility: AMBULARY SURGERY CENTER | Age: 27
End: 2017-04-28

## 2017-04-28 ENCOUNTER — NURSE TRIAGE (OUTPATIENT)
Dept: ADMINISTRATIVE | Facility: CLINIC | Age: 27
End: 2017-04-28

## 2017-04-28 VITALS
SYSTOLIC BLOOD PRESSURE: 111 MMHG | TEMPERATURE: 98 F | RESPIRATION RATE: 18 BRPM | HEIGHT: 63 IN | HEART RATE: 80 BPM | DIASTOLIC BLOOD PRESSURE: 75 MMHG | WEIGHT: 155 LBS | BODY MASS INDEX: 27.46 KG/M2 | OXYGEN SATURATION: 100 %

## 2017-04-28 VITALS
DIASTOLIC BLOOD PRESSURE: 74 MMHG | RESPIRATION RATE: 16 BRPM | BODY MASS INDEX: 27.54 KG/M2 | TEMPERATURE: 99 F | WEIGHT: 155.44 LBS | SYSTOLIC BLOOD PRESSURE: 123 MMHG | HEART RATE: 84 BPM | HEIGHT: 63 IN

## 2017-04-28 DIAGNOSIS — C77.9 REGIONAL LYMPH NODE METASTASIS PRESENT: ICD-10-CM

## 2017-04-28 DIAGNOSIS — R91.1 PULMONARY NODULE: ICD-10-CM

## 2017-04-28 DIAGNOSIS — C50.212 MALIGNANT NEOPLASM OF UPPER-INNER QUADRANT OF LEFT FEMALE BREAST: Primary | ICD-10-CM

## 2017-04-28 PROCEDURE — 99215 OFFICE O/P EST HI 40 MIN: CPT | Mod: S$GLB,,, | Performed by: INTERNAL MEDICINE

## 2017-04-28 PROCEDURE — 1160F RVW MEDS BY RX/DR IN RCRD: CPT | Mod: S$GLB,,, | Performed by: INTERNAL MEDICINE

## 2017-04-28 PROCEDURE — 99999 PR PBB SHADOW E&M-EST. PATIENT-LVL III: CPT | Mod: PBBFAC,,, | Performed by: INTERNAL MEDICINE

## 2017-04-28 NOTE — TELEPHONE ENCOUNTER
Reason for Disposition   [1] Caller requesting NON-URGENT health information AND [2] PCP's office is the best resource    Protocols used: ST INFORMATION ONLY CALL-A-ELLA Thomson is calling with questions regarding dressing for port.  States has an appointment with Oncologist at 11:40 today and will discuss then.

## 2017-04-28 NOTE — PROGRESS NOTES
The patient is a 26-year-old white female well known to me for recently   diagnosed infiltrating ductal carcinoma of the left breast, who returns to   review interval study and to plan neoadjuvant therapy.  No new complaints.  The   patient has had MediPort catheter placed yesterday.  She has minimal soreness   following the procedure.  Otherwise, no other pertinent findings on a 14-point   review of systems.    PHYSICAL EXAMINATION:  GENERAL:  Well-developed, well-nourished white female in no acute distress.  VITAL SIGNS:  Weight 155-1/2 pounds.  The remainder of the physical exam is unchanged from that performed in the   office on 04/21/2017 with the exception of placement of MediPort catheter in the   right subclavian distribution.    LABORATORY:  White count 10, H and H 13.4 and 39, platelets 296.  Normal   differential.  Chemistries:  Sodium 141, potassium 4.4, chloride 105, CO2 of 27,   BUN 11, creatinine 0.8, glucose 97, calcium 8.9, mag 2.0.  Liver function tests   are within normal limits.  LDH is 412.  GFR is greater than 60.  HER-2/raeann   analysis via FISH is negative for overexpression.    MUGA scan:  Left ventricular ejection fraction of 77%.    CT PET scan is remarkable for an irregular hypermetabolic left breast mass at   the 10 o'clock position measuring 3.2 x 1.2 cm with SUV of 3.2 and a   hypermetabolic tumor volume of 16.4 mL.  There is an enlarged hypermetabolic   left axillary lymph node suspicious for metastatic disease, which measures 1.7 x   1.4 cm and has SUV of 3.1 and a hypermetabolic tumor volume of 7.5 mL.  There   is also an incidentally noted 4 mm noncalcified nodule in the posterior aspect   of the right lower lobe that is of uncertain significance by radiographic   criteria and for which additional imaging for followup is suggested.    IMPRESSION:  1.  Clinical stage IIB infiltrating ductal carcinoma of the left breast.  2.  Indeterminate right lower lobe pulmonary  nodule.    PLAN:  1.  Follow up results of pending BRCA1 and 2 analysis as they become available.  2.  I have recommended that the patient undergo neoadjuvant chemotherapy to   consist of four cycles of Adriamycin/Cytoxan followed by four cycles of   Taxotere.  3.  Specifically, the patient will receive Adriamycin at a dose of 60 mg/m2 (105   mg) IV push day one and Cytoxan 600 mg/m2 (1040 mg) IV day one.  The patient   will receive DP 20/0.25, Rhea Persaud MD3 -- b.i.d., p.r.n. Compazine for   control of acute and delayed emesis.  She will also have prophylactic Neulasta 6   mg subQ for prevention of ricki-associated sepsis and/or treatment delay.  4.  Following completion of fourth cycle of Adriamycin and Cytoxan as described   above.  The patient will embark upon four cycles of Taxotere to consist of 100   mg/m2 (175 mg).  While receiving Taxotere, the patient will receive DP 20/0.25,   Rhea Persaud, p.r.n. Compazine for control of acute and delayed emesis.  5.  The patient will also receive typical Benadryl premedication prior to each   Taxotere infusion and will continue to receive prophylactic Neulasta for   prevention of ricki-associated sepsis and/or treatment delay.  6.  In light of the patient's suspicious left axillary lymph, we will ask Dr. Sal's assistance in obtaining an image-guided biopsy of this lesion prior   to the initiation of neoadjuvant therapy as detailed above.  7.  The patient's indeterminate pulmonary nodule will be reexamined with future   scans done to assess the patient's response to neoadjuvant chemotherapy.  8.  The patient and her family have had 40 minutes of contact time spent   counseling concerning proposed neoadjuvant chemotherapy, supportive care   medications, aspects of their administration, potential side effects associated   with therapy, interventions for these, etc.  They voiced understanding and   wished to proceed as above.      OPAL  dd: 04/28/2017 12:55:26  (CDT)  td: 04/28/2017 16:42:48 (JORGE)  Doc ID   #[id number]  Job ID #[id number]    CC:

## 2017-04-28 NOTE — MR AVS SNAPSHOT
[place] [address]  [facility] [address]  Phone: [phone]  Fax: [phone]                  Berto Chun   2017 11:40 AM   Office Visit    Description:  Female : 1990   Provider:  Yusuf Gaming MD   Department:  [facility]           Diagnoses this Visit        Comments    Malignant neoplasm of upper-inner quadrant of left female breast    -  Primary     Regional lymph node metastasis present         Pulmonary nodule                To Do List           Future Appointments        Provider Department Dept Phone    2017 8:30 AM [facility] [phone]      Goals (5 Years of Data)     None      [facility]     [facility] -  Assistance  Unless otherwise directed by your provider, please contact [facility], our nurse care line that is available for  assistance.     Registered nurses in the [facility] provide: appointment scheduling, clinical advisement, health education, and other advisory services.  Call: [phone] (toll free)               Medications           Message regarding Medications     Verify the changes and/or additions to your medication regime listed below are the same as discussed with your clinician today.  If any of these changes or additions are incorrect, please notify your healthcare provider.             Verify that the below list of medications is an accurate representation of the medications you are currently taking.  If none reported, the list may be blank. If incorrect, please contact your healthcare provider. Carry this list with you in case of emergency.           Current Medications     clindamycin-tretinoin (ZIANA) gel Apply a thin coat to the affected area every night.    hydrocodone-acetaminophen 5-325mg (NORCO) 5-325 mg per tablet Take 1 tablet by mouth every 4 (four) hours as needed for Pain.    NAPROXEN SOD/DIPHENHYDRAM HCL  "(ALEVE PM ORAL) Take 2 tablets by mouth nightly as needed.    PNV#75/IRON FUM/FA/OM3/DHA/EPA (ONE A DAY WOMEN'S PRENATAL DHA ORAL) Take 1 tablet by mouth once daily.           Clinical Reference Information           Your Vitals Were     BP Pulse Temp Resp Height Weight    123/74 84 98.6 °F (37 °C) 16 5' 2.5" (1.588 m) 70.5 kg (155 lb 6.8 oz)    Last Period BMI             04/21/2017 27.97 kg/m2         Blood Pressure          Most Recent Value    BP  123/74      Allergies as of 4/28/2017     No Known Allergies      Immunizations Administered on Date of Encounter - 4/28/2017     None      Orders Placed During Today's Visit      Normal Orders This Visit    Ambulatory Referral to [facility]       Language Assistance Services     ATTENTION: Language assistance services are available, free of charge. Please call [phone].      ATENCIÓN: Si habla español, tiene a bui disposición servicios gratuitos de asistencia lingüística. Llame al [phone].     [facility] Ý: N?u b?n nói Ti?ng Vi?t, có các d?ch v? h? tr? ngôn ng? mi?n phí dành cho b?n. G?i s? [phone].         [facility] complies with applicable Federal civil rights laws and does not discriminate on the basis of race, color, national origin, age, disability, or sex.        "

## 2017-05-02 ENCOUNTER — HOSPITAL ENCOUNTER (OUTPATIENT)
Dept: RADIOLOGY | Facility: HOSPITAL | Age: 27
Discharge: HOME | End: 2017-05-02
Attending: INTERNAL MEDICINE
Payer: COMMERCIAL

## 2017-05-02 ENCOUNTER — PATIENT MESSAGE (OUTPATIENT)
Dept: HEMATOLOGY/ONCOLOGY | Facility: CLINIC | Age: 27
End: 2017-05-02

## 2017-05-02 DIAGNOSIS — C50.919 MALIGNANT NEOPLASM OF FEMALE BREAST, UNSPECIFIED LATERALITY, UNSPECIFIED SITE OF BREAST: ICD-10-CM

## 2017-05-02 PROCEDURE — 76942 ECHO GUIDE FOR BIOPSY: CPT | Mod: 26,,, | Performed by: RADIOLOGY

## 2017-05-02 PROCEDURE — 76942 ECHO GUIDE FOR BIOPSY: CPT | Mod: TC,PO

## 2017-05-02 PROCEDURE — 88305 TISSUE EXAM BY PATHOLOGIST: CPT | Performed by: PATHOLOGY

## 2017-05-02 PROCEDURE — 88305 TISSUE EXAM BY PATHOLOGIST: CPT | Mod: 26,,, | Performed by: PATHOLOGY

## 2017-05-02 PROCEDURE — 38505 NEEDLE BIOPSY LYMPH NODES: CPT | Mod: ,,, | Performed by: RADIOLOGY

## 2017-05-05 ENCOUNTER — DOCUMENTATION ONLY (OUTPATIENT)
Dept: INFUSION THERAPY | Facility: HOSPITAL | Age: 27
End: 2017-05-05

## 2017-05-05 NOTE — PROGRESS NOTES
Nutrition: Met with pt and pts  on 5/4/2017 for chemo new patient education. Pt did not complete the nutrition screen however she is not currently a nutritional risk. Pt informed me that she is eating well. Denies weight loss. Denies problems with nausea or bowels at current. Wt: 155# Discussed the importance of weight maintenance, eating a protein rich diet and eating small frequent meals. Discussed protein requirements and the amount of protein in foods. Discussed ways for patient to increase protein intake.  Discussed the importance of consuming adequate hydrating liquids. Discussed nausea MGT. Discussed food safety. Will follow up at cycle 2. Bernie Maurer,MS, RD, LDN

## 2017-05-05 NOTE — PROGRESS NOTES
": LCSW met with pt and spouse for new patient education. Discussed supportive services of the Cancer Center. Pt will be traveling from [facility] for treatment. Pt works from home and can flex schedule as needed. Pt endorses a hopeful and positive attitude. Pt and spouse are adjusting to diagnosis. Pt prepared for the possibility that recent biopsy on lymph node "will not be good". Pt did share that her family has lost many members to cancer of late (patient's grandparents and other relatives) but that she anticipates that her treatment will be successful. LCSW provided support and encouragement. Reviewed activities for self care and stress reduction.  LCSW provided tour of infusion suite. Pt may want to request Friday's for infusions. Awaiting insurance auth then treatment will be scheduled. Akua Gautam LCSW  "

## 2017-05-08 ENCOUNTER — TELEPHONE (OUTPATIENT)
Dept: HEMATOLOGY/ONCOLOGY | Facility: CLINIC | Age: 27
End: 2017-05-08

## 2017-05-12 ENCOUNTER — PATIENT MESSAGE (OUTPATIENT)
Dept: HEMATOLOGY/ONCOLOGY | Facility: CLINIC | Age: 27
End: 2017-05-12

## 2017-05-12 ENCOUNTER — PATIENT MESSAGE (OUTPATIENT)
Dept: SURGERY | Facility: CLINIC | Age: 27
End: 2017-05-12

## 2017-05-15 ENCOUNTER — INFUSION (OUTPATIENT)
Dept: INFUSION THERAPY | Facility: HOSPITAL | Age: 27
End: 2017-05-15
Attending: INTERNAL MEDICINE
Payer: COMMERCIAL

## 2017-05-15 ENCOUNTER — PATIENT MESSAGE (OUTPATIENT)
Dept: HEMATOLOGY/ONCOLOGY | Facility: CLINIC | Age: 27
End: 2017-05-15

## 2017-05-15 ENCOUNTER — OFFICE VISIT (OUTPATIENT)
Dept: HEMATOLOGY/ONCOLOGY | Facility: CLINIC | Age: 27
End: 2017-05-15
Payer: COMMERCIAL

## 2017-05-15 VITALS
HEART RATE: 82 BPM | HEIGHT: 63 IN | WEIGHT: 157.44 LBS | DIASTOLIC BLOOD PRESSURE: 78 MMHG | BODY MASS INDEX: 27.89 KG/M2 | RESPIRATION RATE: 17 BRPM | TEMPERATURE: 99 F | SYSTOLIC BLOOD PRESSURE: 107 MMHG

## 2017-05-15 VITALS
RESPIRATION RATE: 17 BRPM | HEART RATE: 82 BPM | BODY MASS INDEX: 27.89 KG/M2 | DIASTOLIC BLOOD PRESSURE: 78 MMHG | HEIGHT: 63 IN | SYSTOLIC BLOOD PRESSURE: 107 MMHG | WEIGHT: 157.44 LBS

## 2017-05-15 DIAGNOSIS — C77.9 REGIONAL LYMPH NODE METASTASIS PRESENT: ICD-10-CM

## 2017-05-15 DIAGNOSIS — R91.1 PULMONARY NODULE: ICD-10-CM

## 2017-05-15 DIAGNOSIS — C50.212 MALIGNANT NEOPLASM OF UPPER-INNER QUADRANT OF LEFT FEMALE BREAST: Primary | ICD-10-CM

## 2017-05-15 PROCEDURE — 96377 APPLICATON ON-BODY INJECTOR: CPT | Mod: PN

## 2017-05-15 PROCEDURE — 1160F RVW MEDS BY RX/DR IN RCRD: CPT | Mod: S$GLB,,, | Performed by: INTERNAL MEDICINE

## 2017-05-15 PROCEDURE — 99999 PR PBB SHADOW E&M-EST. PATIENT-LVL III: CPT | Mod: PBBFAC,,, | Performed by: INTERNAL MEDICINE

## 2017-05-15 PROCEDURE — 99214 OFFICE O/P EST MOD 30 MIN: CPT | Mod: S$GLB,,, | Performed by: INTERNAL MEDICINE

## 2017-05-15 PROCEDURE — 63600175 PHARM REV CODE 636 W HCPCS: Mod: PN | Performed by: INTERNAL MEDICINE

## 2017-05-15 PROCEDURE — 96367 TX/PROPH/DG ADDL SEQ IV INF: CPT | Mod: PN

## 2017-05-15 PROCEDURE — 96413 CHEMO IV INFUSION 1 HR: CPT | Mod: PN

## 2017-05-15 PROCEDURE — 25000003 PHARM REV CODE 250: Mod: PN | Performed by: INTERNAL MEDICINE

## 2017-05-15 PROCEDURE — 96411 CHEMO IV PUSH ADDL DRUG: CPT | Mod: PN

## 2017-05-15 RX ORDER — LIDOCAINE 50 MG/G
OINTMENT TOPICAL
COMMUNITY
Start: 2017-04-30 | End: 2017-05-15

## 2017-05-15 RX ORDER — HEPARIN 100 UNIT/ML
500 SYRINGE INTRAVENOUS
Status: CANCELLED | OUTPATIENT
Start: 2017-05-15

## 2017-05-15 RX ORDER — ALPRAZOLAM 0.25 MG/1
TABLET ORAL
Refills: 0 | COMMUNITY
Start: 2017-05-02 | End: 2017-10-03 | Stop reason: SDUPTHER

## 2017-05-15 RX ORDER — LORAZEPAM 2 MG/ML
1 INJECTION INTRAMUSCULAR ONCE
Status: CANCELLED
Start: 2017-05-15 | End: 2017-05-15

## 2017-05-15 RX ORDER — METOCLOPRAMIDE 10 MG/1
TABLET ORAL
Refills: 3 | Status: ON HOLD | COMMUNITY
Start: 2017-04-28 | End: 2017-07-28 | Stop reason: HOSPADM

## 2017-05-15 RX ORDER — HEPARIN 100 UNIT/ML
500 SYRINGE INTRAVENOUS
Status: DISCONTINUED | OUTPATIENT
Start: 2017-05-15 | End: 2017-05-15 | Stop reason: HOSPADM

## 2017-05-15 RX ORDER — DOXORUBICIN HYDROCHLORIDE 2 MG/ML
60 INJECTION, SOLUTION INTRAVENOUS ONCE
Status: COMPLETED | OUTPATIENT
Start: 2017-05-15 | End: 2017-05-15

## 2017-05-15 RX ORDER — LORAZEPAM 2 MG/ML
1 INJECTION INTRAMUSCULAR ONCE
Status: DISCONTINUED | OUTPATIENT
Start: 2017-05-15 | End: 2017-05-15 | Stop reason: HOSPADM

## 2017-05-15 RX ORDER — SODIUM CHLORIDE 0.9 % (FLUSH) 0.9 %
10 SYRINGE (ML) INJECTION
Status: CANCELLED | OUTPATIENT
Start: 2017-05-15

## 2017-05-15 RX ORDER — DESVENLAFAXINE 50 MG/1
1 TABLET, EXTENDED RELEASE ORAL DAILY
COMMUNITY
Start: 2017-04-28 | End: 2017-12-28 | Stop reason: SDUPTHER

## 2017-05-15 RX ORDER — PROCHLORPERAZINE MALEATE 10 MG
TABLET ORAL
Refills: 6 | COMMUNITY
Start: 2017-04-28 | End: 2022-07-20

## 2017-05-15 RX ORDER — DOXORUBICIN HYDROCHLORIDE 2 MG/ML
60 INJECTION, SOLUTION INTRAVENOUS ONCE
Status: CANCELLED | OUTPATIENT
Start: 2017-05-15

## 2017-05-15 RX ORDER — SODIUM CHLORIDE 0.9 % (FLUSH) 0.9 %
10 SYRINGE (ML) INJECTION
Status: DISCONTINUED | OUTPATIENT
Start: 2017-05-15 | End: 2017-05-15 | Stop reason: HOSPADM

## 2017-05-15 RX ORDER — DEXAMETHASONE 4 MG/1
TABLET ORAL
Refills: 3 | COMMUNITY
Start: 2017-04-28 | End: 2017-09-18

## 2017-05-15 RX ADMIN — PEGFILGRASTIM 6 MG: KIT SUBCUTANEOUS at 02:05

## 2017-05-15 RX ADMIN — CYCLOPHOSPHAMIDE 1040 MG: 1 INJECTION, POWDER, FOR SOLUTION INTRAVENOUS; ORAL at 01:05

## 2017-05-15 RX ADMIN — SODIUM CHLORIDE 150 MG: 9 INJECTION, SOLUTION INTRAVENOUS at 12:05

## 2017-05-15 RX ADMIN — SODIUM CHLORIDE, PRESERVATIVE FREE 10 ML: 5 INJECTION INTRAVENOUS at 12:05

## 2017-05-15 RX ADMIN — SODIUM CHLORIDE: 9 INJECTION, SOLUTION INTRAVENOUS at 12:05

## 2017-05-15 RX ADMIN — DOXORUBICIN HYDROCHLORIDE 104 MG: 2 INJECTION, SOLUTION INTRAVENOUS at 01:05

## 2017-05-15 RX ADMIN — HEPARIN 500 UNITS: 100 SYRINGE at 02:05

## 2017-05-15 RX ADMIN — PALONOSETRON HYDROCHLORIDE 0.25 MG: 0.25 INJECTION INTRAVENOUS at 12:05

## 2017-05-15 NOTE — PROGRESS NOTES
The patient is a 26-year-old white female well known to me for recently   diagnosed infiltrating ductal carcinoma of the left breast, who returns to   clinic to initiate neoadjuvant chemotherapy.  The patient has undergone biopsy   of FDG avid left axillary lymph node.  Unfortunately, pathology is positive for   the presence of malignancy.    PHYSICAL EXAMINATION:  Unchanged from that performed at the time of my last office evaluation.    IMPRESSION:  1.  Stage II-B infiltrating ductal carcinoma of the left breast.  2.  Indeterminate right lower lobe pulmonary nodule.    PLAN:  1.  Follow up results of BRCA 1 and 2 testing.  2.  Proceed with cycle one of adjuvant therapy as outlined in my note of   04/28/2017.  3.  Return to clinic in three weeks with interval CBC, CMP, LDH and magnesium   prior to cycle two of Adriamycin/Cytoxan.      CHRIS/PN  dd: 05/15/2017 11:23:36 (CDT)  td: 05/15/2017 15:12:34 (CDT)  Doc ID   #[id number]  Job ID #[id number]    CC:

## 2017-05-15 NOTE — PLAN OF CARE
Problem: Chemotherapy Effects (Adult)  Goal: Signs and Symptoms of Listed Potential Problems Will be Absent, Minimized or Managed (Chemotherapy Effects)  Signs and symptoms of listed potential problems will be absent, minimized or managed by discharge/transition of care (reference Chemotherapy Effects (Adult) CPG).   Outcome: Ongoing (interventions implemented as appropriate)  Tolerated treatment well.    Problem: Patient Care Overview (Adult)  Goal: Plan of Care Review  Outcome: Ongoing (interventions implemented as appropriate)  No complaints, family at her side.  Goal: Interdisciplinary Rounds/Family Conf  Outcome: Ongoing (interventions implemented as appropriate)  No complaints.

## 2017-05-15 NOTE — MR AVS SNAPSHOT
[facility]  [facility]  [facility] [address]  Phone: [phone]  Fax: [phone]                  Berto Mireles Adiel   5/15/2017 10:40 AM   Office Visit    Description:  Female : 1990   Provider:  Yusuf Gaming MD   Department:  [facility]                To Do List           Future Appointments        Provider Department Dept Phone    5/15/2017 11:30 AM CHAIR 22, [facility] [phone]    2017 11:30 AM CHAIR 34, [facility] [phone]      Goals (5 Years of Data)     None      [facility]     [facility] Care [facility] -  Assistance  Unless otherwise directed by your provider, please contact [facility], our nurse care line that is available for  assistance.     Registered nurses in the [facility] provide: appointment scheduling, clinical advisement, health education, and other advisory services.  Call: [phone] (toll free)               Medications           Message regarding Medications     Verify the changes and/or additions to your medication regime listed below are the same as discussed with your clinician today.  If any of these changes or additions are incorrect, please notify your healthcare provider.        STOP taking these medications     lidocaine (XYLOCAINE) 5 % Oint ointment            Verify that the below list of medications is an accurate representation of the medications you are currently taking.  If none reported, the list may be blank. If incorrect, please contact your healthcare provider. Carry this list with you in case of emergency.           Current Medications     alprazolam (XANAX) 0.25 MG tablet TK 1 TO 2 TS PO QHS PRF INSOMNIA    clindamycin-tretinoin (ZIANA) gel Apply a thin coat to the affected area every night.    desvenlafaxine 50 mg Tb24     dexamethasone (DECADRON) 4 MG Tab TK 2 TS PO Q 12 H TK FIRST DOSE  "EXACTLY 16 HOURS AFTER CHEMOTHERAPY    hydrocodone-acetaminophen 5-325mg (NORCO) 5-325 mg per tablet Take 1 tablet by mouth every 4 (four) hours as needed for Pain.    metoclopramide HCl (REGLAN) 10 MG tablet TK 4 TS PO Q 12 H START EXACTLY 16 H AFTER CHEMOTHERAPY    NAPROXEN SOD/DIPHENHYDRAM HCL (ALEVE PM ORAL) Take 2 tablets by mouth nightly as needed.    prochlorperazine (COMPAZINE) 10 MG tablet TK 1 T PO Q 6 H PRF NV    water Liqd 150 mL with MILK OF MAGNESIA 400 mg/5 mL Susp 400 mg, diphenhydrAMINE 12.5 mg/5 mL Elix 60 mg, nystatin 100,000 unit/mL Susp 500,000 Units SWISH AND SWALLOW 5ML PO Q 4 H PRF MOUTH/THROAT PAIN    PNV#75/IRON FUM/FA/OM3/DHA/EPA (ONE A DAY WOMEN'S PRENATAL DHA ORAL) Take 1 tablet by mouth once daily.           Clinical Reference Information           Your Vitals Were     Resp Height Weight Last Period BMI    17 5' 2.5" (1.588 m) 71.4 kg (157 lb 6.5 oz) 04/21/2017 28.33 kg/m2      Allergies as of 5/15/2017     No Known Allergies      Immunizations Administered on Date of Encounter - 5/15/2017     None      Language Assistance Services     ATTENTION: Language assistance services are available, free of charge. Please call [phone].      ATENCIÓN: Si habla español, tiene a bui disposición servicios gratuitos de asistencia lingüística. Llame al [phone].     [facility] Ý: N?u b?n nói Ti?ng Vi?t, có các d?ch v? h? tr? ngôn ng? mi?n phí dành cho b?n. G?i s? [phone].         [facility] complies with applicable Federal civil rights laws and does not discriminate on the basis of race, color, national origin, age, disability, or sex.        "

## 2017-05-15 NOTE — MR AVS SNAPSHOT
Patient Information     Patient Name Sex     Berto Chun Female 1990      Visit Information        Provider Department Dept Phone Center    5/15/2017 11:30 AM CHAIR 22, [facility] [phone] [facility]      Patient Instructions     None      Your Current Medications Are     alprazolam (XANAX) 0.25 MG tablet    clindamycin-tretinoin (ZIANA) gel    desvenlafaxine 50 mg Tb24    dexamethasone (DECADRON) 4 MG Tab    hydrocodone-acetaminophen 5-325mg (NORCO) 5-325 mg per tablet    metoclopramide HCl (REGLAN) 10 MG tablet    NAPROXEN SOD/DIPHENHYDRAM HCL (ALEVE PM ORAL)    prochlorperazine (COMPAZINE) 10 MG tablet    water Liqd 150 mL with MILK OF MAGNESIA 400 mg/5 mL Susp 400 mg, diphenhydrAMINE 12.5 mg/5 mL Elix 60 mg, nystatin 100,000 unit/mL Susp 500,000 Units    PNV#75/IRON FUM/FA/OM3/DHA/EPA (ONE A DAY WOMEN'S PRENATAL DHA ORAL)    lidocaine (XYLOCAINE) 5 % Oint ointment (Discontinued)      Facility-Administered Medications     cyclophosphamide (CYTOXAN) 600 mg/m2 = 1,040 mg in sodium chloride 0.9% 250 mL chemo infusion    DOXOrubicin chemo injection 104 mg    fosaprepitant 150 mg in sodium chloride 0.9% 150 mL IVPB    heparin, porcine (PF) 100 unit/mL injection flush 500 Units    lorazepam injection 1 mg    palonosetron 0.25mg/dexamethasone 20mg IVPB    pegfilgrastim (NEULASTA (ON BODY INJECTOR)) injection 6 mg    sodium chloride 0.9% 250 mL flush bag    sodium chloride 0.9% flush 10 mL      Appointments for Next Year     2017 11:30 AM INFUSION 300 MIN (300 min.) [facility] CHAIR 34, [facility] CHE[facility]    Arrive at check-in approximately 15 minutes before your scheduled appointment time. Bring all outside medical records and imaging, along with a list of your current medications and insurance card.    1st Floor         Default Flowsheet Data (last 24 hours)      Amb Complex Vitals Evangelista        05/15/17 1148 05/15/17 1049              "Measurements    Weight 71.4 kg (157 lb 6.5 oz) 71.4 kg (157 lb 6.5 oz)       Height 5' 2.5" (1.588 m) 5' 2.5" (1.588 m)       BSA (Calculated - sq m) 1.77 sq meters 1.77 sq meters       BMI (Calculated) 28.4 28.4       /78 107/78       Temp 98.6 °F (37 °C)        Pulse 82 82       Resp 17 17       Pain Assessment    Pain Score Zero Zero               Allergies     No Known Allergies      Medications You Received from 05/14/2017 1250 to 05/15/2017 1250        Date/Time Order Dose Route Action     05/15/2017 1238 fosaprepitant 150 mg in sodium chloride 0.9% 150 mL IVPB 150 mg Intravenous New Bag     05/15/2017 1210 palonosetron 0.25mg/dexamethasone 20mg IVPB 0.25 mg Intravenous New Bag     05/15/2017 1210 sodium chloride 0.9% 250 mL flush bag   Intravenous New Bag     05/15/2017 1210 sodium chloride 0.9% flush 10 mL 10 mL Intravenous Given      Current Discharge Medication List     Cannot display discharge medications since this is not an admission.      "

## 2017-05-17 ENCOUNTER — NURSE TRIAGE (OUTPATIENT)
Dept: ADMINISTRATIVE | Facility: CLINIC | Age: 27
End: 2017-05-17

## 2017-05-17 NOTE — TELEPHONE ENCOUNTER
Reason for Disposition   [1] Blurred vision or visual changes AND [2] present now AND [3] sudden onset or new (e.g., minutes, hours, days)  (Exception: previously diagnosed migraine headaches with same symptoms)    Answer Assessment - Initial Assessment Questions  Pt had chemo 2 days ago. Was fine yesterday but today having eye problems. Related it to being on the computer but has been taking frequent breaks and no improvement. Reported if she looks straight ahead vision is fuzzy but clears if she blinks hard but she cannot look down. Can look side to side, moving eyes, but it is slow. No pain reported. Very concerned.    Protocols used: ST VISION LOSS OR CHANGE-A-AH

## 2017-05-18 ENCOUNTER — PATIENT MESSAGE (OUTPATIENT)
Dept: HEMATOLOGY/ONCOLOGY | Facility: CLINIC | Age: 27
End: 2017-05-18

## 2017-05-18 ENCOUNTER — DOCUMENTATION ONLY (OUTPATIENT)
Dept: HEMATOLOGY/ONCOLOGY | Facility: CLINIC | Age: 27
End: 2017-05-18

## 2017-05-18 NOTE — PROGRESS NOTES
Spoke with DR. Gaming, side effects are likely from the Steroids . Called pt confirmed all issues are vision only no symptoms. Advised pt to stop trying to look at the computer, rest eyes, Place cool cloth over eyes and try to rest. Also if needed take her prescribed anxiolytics as directed to help her thru this until the effects pass ( as per DR. Gaming recommendations  ) pt verbalized understanding, was calmed by the information and verbalized that she would follow these recommendations and call if any other issues arose.

## 2017-06-05 ENCOUNTER — OFFICE VISIT (OUTPATIENT)
Dept: HEMATOLOGY/ONCOLOGY | Facility: CLINIC | Age: 27
End: 2017-06-05
Payer: COMMERCIAL

## 2017-06-05 ENCOUNTER — LAB VISIT (OUTPATIENT)
Dept: LAB | Facility: HOSPITAL | Age: 27
End: 2017-06-05
Attending: INTERNAL MEDICINE
Payer: COMMERCIAL

## 2017-06-05 ENCOUNTER — INFUSION (OUTPATIENT)
Dept: INFUSION THERAPY | Facility: HOSPITAL | Age: 27
End: 2017-06-05
Attending: INTERNAL MEDICINE
Payer: COMMERCIAL

## 2017-06-05 VITALS
RESPIRATION RATE: 16 BRPM | HEART RATE: 82 BPM | HEIGHT: 63 IN | BODY MASS INDEX: 27.73 KG/M2 | TEMPERATURE: 99 F | DIASTOLIC BLOOD PRESSURE: 61 MMHG | SYSTOLIC BLOOD PRESSURE: 102 MMHG | HEIGHT: 63 IN | TEMPERATURE: 99 F | DIASTOLIC BLOOD PRESSURE: 61 MMHG | WEIGHT: 158.06 LBS | HEART RATE: 82 BPM | SYSTOLIC BLOOD PRESSURE: 102 MMHG | BODY MASS INDEX: 28 KG/M2 | WEIGHT: 156.5 LBS | RESPIRATION RATE: 16 BRPM

## 2017-06-05 DIAGNOSIS — R91.1 PULMONARY NODULE: ICD-10-CM

## 2017-06-05 DIAGNOSIS — C77.9 REGIONAL LYMPH NODE METASTASIS PRESENT: ICD-10-CM

## 2017-06-05 DIAGNOSIS — C50.212 MALIGNANT NEOPLASM OF UPPER-INNER QUADRANT OF LEFT FEMALE BREAST: ICD-10-CM

## 2017-06-05 DIAGNOSIS — C50.212 MALIGNANT NEOPLASM OF UPPER-INNER QUADRANT OF LEFT FEMALE BREAST: Primary | ICD-10-CM

## 2017-06-05 LAB
ALBUMIN SERPL BCP-MCNC: 4.2 G/DL
ALP SERPL-CCNC: 60 U/L
ALT SERPL W/O P-5'-P-CCNC: 29 U/L
ANION GAP SERPL CALC-SCNC: 7 MMOL/L
AST SERPL-CCNC: 27 U/L
BASOPHILS # BLD AUTO: 0.06 K/UL
BASOPHILS NFR BLD: 1 %
BILIRUB SERPL-MCNC: 0.5 MG/DL
BUN SERPL-MCNC: 14 MG/DL
CALCIUM SERPL-MCNC: 8.9 MG/DL
CHLORIDE SERPL-SCNC: 106 MMOL/L
CO2 SERPL-SCNC: 27 MMOL/L
CREAT SERPL-MCNC: 0.69 MG/DL
DIFFERENTIAL METHOD: ABNORMAL
EOSINOPHIL # BLD AUTO: 0 K/UL
EOSINOPHIL NFR BLD: 0.3 %
ERYTHROCYTE [DISTWIDTH] IN BLOOD BY AUTOMATED COUNT: 12.7 %
EST. GFR  (AFRICAN AMERICAN): >60 ML/MIN/1.73 M^2
EST. GFR  (NON AFRICAN AMERICAN): >60 ML/MIN/1.73 M^2
GLUCOSE SERPL-MCNC: 88 MG/DL
HCT VFR BLD AUTO: 35.1 %
HGB BLD-MCNC: 11.7 G/DL
LDH SERPL L TO P-CCNC: 415 U/L
LYMPHOCYTES # BLD AUTO: 1.6 K/UL
LYMPHOCYTES NFR BLD: 26.1 %
MAGNESIUM SERPL-MCNC: 1.8 MG/DL
MCH RBC QN AUTO: 30.5 PG
MCHC RBC AUTO-ENTMCNC: 33.3 %
MCV RBC AUTO: 92 FL
MONOCYTES # BLD AUTO: 0.7 K/UL
MONOCYTES NFR BLD: 11.7 %
NEUTROPHILS # BLD AUTO: 3.7 K/UL
NEUTROPHILS NFR BLD: 60.9 %
NRBC BLD-RTO: 0 /100 WBC
PLATELET # BLD AUTO: 296 K/UL
PMV BLD AUTO: 9.3 FL
POTASSIUM SERPL-SCNC: 4.7 MMOL/L
PROT SERPL-MCNC: 7.1 G/DL
RBC # BLD AUTO: 3.83 M/UL
SODIUM SERPL-SCNC: 140 MMOL/L
WBC # BLD AUTO: 6.09 K/UL

## 2017-06-05 PROCEDURE — 99214 OFFICE O/P EST MOD 30 MIN: CPT | Mod: S$GLB,,, | Performed by: INTERNAL MEDICINE

## 2017-06-05 PROCEDURE — 99999 PR PBB SHADOW E&M-EST. PATIENT-LVL IV: CPT | Mod: PBBFAC,,, | Performed by: INTERNAL MEDICINE

## 2017-06-05 PROCEDURE — 85025 COMPLETE CBC W/AUTO DIFF WBC: CPT

## 2017-06-05 PROCEDURE — 96411 CHEMO IV PUSH ADDL DRUG: CPT | Mod: PN

## 2017-06-05 PROCEDURE — 96367 TX/PROPH/DG ADDL SEQ IV INF: CPT | Mod: PN

## 2017-06-05 PROCEDURE — 25000003 PHARM REV CODE 250: Mod: PN | Performed by: INTERNAL MEDICINE

## 2017-06-05 PROCEDURE — 96413 CHEMO IV INFUSION 1 HR: CPT | Mod: PN

## 2017-06-05 PROCEDURE — 83615 LACTATE (LD) (LDH) ENZYME: CPT

## 2017-06-05 PROCEDURE — 83735 ASSAY OF MAGNESIUM: CPT

## 2017-06-05 PROCEDURE — 80053 COMPREHEN METABOLIC PANEL: CPT

## 2017-06-05 PROCEDURE — 85025 COMPLETE CBC W/AUTO DIFF WBC: CPT | Mod: PN

## 2017-06-05 PROCEDURE — 63600175 PHARM REV CODE 636 W HCPCS: Mod: JW,PN | Performed by: INTERNAL MEDICINE

## 2017-06-05 PROCEDURE — 83735 ASSAY OF MAGNESIUM: CPT | Mod: PN

## 2017-06-05 PROCEDURE — 83615 LACTATE (LD) (LDH) ENZYME: CPT | Mod: PN

## 2017-06-05 PROCEDURE — 96377 APPLICATON ON-BODY INJECTOR: CPT | Mod: PN

## 2017-06-05 PROCEDURE — 36415 COLL VENOUS BLD VENIPUNCTURE: CPT | Mod: PN

## 2017-06-05 PROCEDURE — 80053 COMPREHEN METABOLIC PANEL: CPT | Mod: PN

## 2017-06-05 RX ORDER — DOXORUBICIN HYDROCHLORIDE 2 MG/ML
60 INJECTION, SOLUTION INTRAVENOUS ONCE
Status: CANCELLED | OUTPATIENT
Start: 2017-06-05

## 2017-06-05 RX ORDER — SODIUM CHLORIDE 0.9 % (FLUSH) 0.9 %
10 SYRINGE (ML) INJECTION
Status: DISCONTINUED | OUTPATIENT
Start: 2017-06-05 | End: 2017-06-05 | Stop reason: HOSPADM

## 2017-06-05 RX ORDER — HEPARIN 100 UNIT/ML
500 SYRINGE INTRAVENOUS
Status: CANCELLED | OUTPATIENT
Start: 2017-06-05

## 2017-06-05 RX ORDER — DOXORUBICIN HYDROCHLORIDE 2 MG/ML
60 INJECTION, SOLUTION INTRAVENOUS
Status: COMPLETED | OUTPATIENT
Start: 2017-06-05 | End: 2017-06-05

## 2017-06-05 RX ORDER — SODIUM CHLORIDE 0.9 % (FLUSH) 0.9 %
10 SYRINGE (ML) INJECTION
Status: CANCELLED | OUTPATIENT
Start: 2017-06-05

## 2017-06-05 RX ORDER — HEPARIN 100 UNIT/ML
500 SYRINGE INTRAVENOUS
Status: DISCONTINUED | OUTPATIENT
Start: 2017-06-05 | End: 2017-06-05 | Stop reason: HOSPADM

## 2017-06-05 RX ORDER — LORAZEPAM 2 MG/ML
1 INJECTION INTRAMUSCULAR ONCE
Status: CANCELLED
Start: 2017-06-05 | End: 2017-06-05

## 2017-06-05 RX ADMIN — CYCLOPHOSPHAMIDE 1040 MG: 1 INJECTION, POWDER, FOR SOLUTION INTRAVENOUS; ORAL at 01:06

## 2017-06-05 RX ADMIN — DOXORUBICIN HYDROCHLORIDE 104 MG: 2 INJECTION, SOLUTION INTRAVENOUS at 12:06

## 2017-06-05 RX ADMIN — PALONOSETRON HYDROCHLORIDE: 0.25 INJECTION INTRAVENOUS at 12:06

## 2017-06-05 RX ADMIN — HEPARIN 500 UNITS: 100 SYRINGE at 01:06

## 2017-06-05 RX ADMIN — SODIUM CHLORIDE, PRESERVATIVE FREE 10 ML: 5 INJECTION INTRAVENOUS at 12:06

## 2017-06-05 RX ADMIN — SODIUM CHLORIDE 150 MG: 9 INJECTION, SOLUTION INTRAVENOUS at 12:06

## 2017-06-05 RX ADMIN — PEGFILGRASTIM 6 MG: KIT SUBCUTANEOUS at 01:06

## 2017-06-05 RX ADMIN — SODIUM CHLORIDE: 0.9 INJECTION, SOLUTION INTRAVENOUS at 12:06

## 2017-06-05 NOTE — PROGRESS NOTES
The patient is a 26-year-old white female well known to me for infiltrating   ductal carcinoma of the left breast who has initiated neoadjuvant chemotherapy   with Adriamycin/Cytoxan and returns to clinic for interval evaluation prior to   cycle 2 of therapy.  The patient had mild nausea without emesis, fatigue, and   some visual disturbance while on delayed emesis meds.  No other pertinent   findings on a 14-point review of system.    PHYSICAL EXAMINATION:  GENERAL:  The patient is a well-developed, well-nourished white female in no   acute distress.  VITAL SIGNS:  Weight of 158 pounds.  HEENT:  Near total alopecia.  Normocephalic, atraumatic.  Oral mucosa pink and   moist.  Lips without lesions.  Tongue midline.  Oropharynx clear.  Nonicteric   sclerae.  NECK:  Supple, no adenopathy.  No carotid bruits, thyromegaly or thyroid nodule.  HEART:  Regular rate and rhythm without murmur, gallop or rub.  LUNGS:  Clear to auscultation bilaterally.  Normal respiratory effort.  ABDOMEN:  Soft, nontender, nondistended with positive normoactive bowel sounds,   no hepatosplenomegaly.  EXTREMITIES:  No cyanosis, clubbing or edema.  Distal pulses are intact.  AXILLAE AND GROIN:  No palpable pathologic lymphadenopathy is appreciated.  SKIN:  Intact/turgor normal.  NEUROLOGIC:  Cranial nerves II-XII grossly intact.  Motor:  Good muscle bulk and   tone.  Strength/sensory 5/5 throughout.  Gait stable.  BREAST:  The patient's breast mass appears flatter on today's examination and at   the time of her last evaluation.  Axillary lymph node metastasis appears to   have diminished.    LABORATORY:  White count 6.1, H and H 11.7 and 35.1, platelets 296.  Chemistry:    Sodium 140, potassium 4.7, chloride 106, CO2 27, BUN 14, creatinine 0.7,   glucose 88, calcium 8.9, mag 1.8.  Liver function tests within normal limits.    LDH is 415.  Testing for BRCA 1 and 2 is negative.    IMPRESSION:  1.  Stage IIB infiltrating ductal carcinoma of the  left breast with favorable   response to neoadjuvant therapy.  2.  Indeterminate right lower lobe pulmonary nodule.    PLAN:  1.  Proceed with second cycle of neoadjuvant chemotherapy to consist of   Adriamycin 105 mg IV push day 1 and Cytoxan 1040 mg IV piggyback day 1.  2.  The patient will receive DP 20/0.25, Rhea Persaud MD3 -- b.i.d., and   p.r.n. Compazine for control of acute and delayed emesis.  3.  Prophylactic Neulasta 6 mg subQ via OBI for prevention of ricki-associated   sepsis and/or treatment delay.  4.  Return to clinic in three weeks with interval CBC, CMP, LDH and magnesium.    At which time, we will proceed with third of a planned four cycles of   neoadjuvant therapy.      CHRIS/PN  dd: 06/05/2017 11:44:39 (CDT)  td: 06/05/2017 19:59:44 (CDT)  Doc ID   #[id number]  Job ID #[id number]    CC:

## 2017-06-06 ENCOUNTER — DOCUMENTATION ONLY (OUTPATIENT)
Dept: INFUSION THERAPY | Facility: HOSPITAL | Age: 27
End: 2017-06-06

## 2017-06-06 NOTE — PROGRESS NOTES
Nutrition: Late entry. Met with pt today and pts family today while pt receiving chemo infusion (6/5/17). Pt states she experienced symptoms of N/V and did not have an appetite after the first week of chemotherapy. Pt denies any N/V at this time. Good appetite reported.  Pt is not currently a nutritional risk.  Pt consumes 3 meals daily. Pt states she consumes a protein shake every morning. Pt states he consumes 4 (32 oz) cups of water daily. Encourage pt to continue to consume a high protein/adequate calorie diet.  Discussed the importance of wt maintenance. Encouraged pt to continue to consume at least 64 oz of hydrating fluids daily. Weight: 158 lbs, 3 lb wt gain noted since 5/5/17. Will follow-up with pt to assist as needed. Che Boo RD ,LDN.

## 2017-06-12 ENCOUNTER — PATIENT MESSAGE (OUTPATIENT)
Dept: HEMATOLOGY/ONCOLOGY | Facility: CLINIC | Age: 27
End: 2017-06-12

## 2017-06-26 ENCOUNTER — OFFICE VISIT (OUTPATIENT)
Dept: HEMATOLOGY/ONCOLOGY | Facility: CLINIC | Age: 27
End: 2017-06-26
Payer: COMMERCIAL

## 2017-06-26 ENCOUNTER — INFUSION (OUTPATIENT)
Dept: INFUSION THERAPY | Facility: HOSPITAL | Age: 27
End: 2017-06-26
Attending: INTERNAL MEDICINE
Payer: COMMERCIAL

## 2017-06-26 VITALS
BODY MASS INDEX: 28.32 KG/M2 | SYSTOLIC BLOOD PRESSURE: 102 MMHG | HEIGHT: 63 IN | DIASTOLIC BLOOD PRESSURE: 70 MMHG | HEART RATE: 99 BPM | WEIGHT: 159.81 LBS | TEMPERATURE: 98 F | RESPIRATION RATE: 16 BRPM

## 2017-06-26 VITALS
HEIGHT: 63 IN | SYSTOLIC BLOOD PRESSURE: 98 MMHG | RESPIRATION RATE: 16 BRPM | DIASTOLIC BLOOD PRESSURE: 62 MMHG | WEIGHT: 159.81 LBS | HEART RATE: 93 BPM | TEMPERATURE: 99 F | BODY MASS INDEX: 28.32 KG/M2

## 2017-06-26 DIAGNOSIS — C50.212 MALIGNANT NEOPLASM OF UPPER-INNER QUADRANT OF LEFT FEMALE BREAST: Primary | ICD-10-CM

## 2017-06-26 DIAGNOSIS — C77.9 REGIONAL LYMPH NODE METASTASIS PRESENT: ICD-10-CM

## 2017-06-26 DIAGNOSIS — R91.1 PULMONARY NODULE: ICD-10-CM

## 2017-06-26 PROCEDURE — 99214 OFFICE O/P EST MOD 30 MIN: CPT | Mod: S$GLB,,, | Performed by: INTERNAL MEDICINE

## 2017-06-26 PROCEDURE — 25000003 PHARM REV CODE 250: Mod: PN | Performed by: INTERNAL MEDICINE

## 2017-06-26 PROCEDURE — 96411 CHEMO IV PUSH ADDL DRUG: CPT | Mod: PN

## 2017-06-26 PROCEDURE — 96377 APPLICATON ON-BODY INJECTOR: CPT | Mod: PN

## 2017-06-26 PROCEDURE — 96413 CHEMO IV INFUSION 1 HR: CPT | Mod: PN

## 2017-06-26 PROCEDURE — 96367 TX/PROPH/DG ADDL SEQ IV INF: CPT | Mod: PN

## 2017-06-26 PROCEDURE — 99999 PR PBB SHADOW E&M-EST. PATIENT-LVL III: CPT | Mod: PBBFAC,,, | Performed by: INTERNAL MEDICINE

## 2017-06-26 PROCEDURE — 63600175 PHARM REV CODE 636 W HCPCS: Mod: PN | Performed by: INTERNAL MEDICINE

## 2017-06-26 RX ORDER — HEPARIN 100 UNIT/ML
500 SYRINGE INTRAVENOUS
Status: CANCELLED | OUTPATIENT
Start: 2017-06-26

## 2017-06-26 RX ORDER — HEPARIN 100 UNIT/ML
500 SYRINGE INTRAVENOUS
Status: DISCONTINUED | OUTPATIENT
Start: 2017-06-26 | End: 2017-06-26 | Stop reason: HOSPADM

## 2017-06-26 RX ORDER — SODIUM CHLORIDE 0.9 % (FLUSH) 0.9 %
10 SYRINGE (ML) INJECTION
Status: DISCONTINUED | OUTPATIENT
Start: 2017-06-26 | End: 2017-06-26 | Stop reason: HOSPADM

## 2017-06-26 RX ORDER — LORAZEPAM 2 MG/ML
1 INJECTION INTRAMUSCULAR ONCE
Status: CANCELLED
Start: 2017-06-26 | End: 2017-06-26

## 2017-06-26 RX ORDER — LORAZEPAM 2 MG/ML
1 INJECTION INTRAMUSCULAR ONCE
Status: DISCONTINUED | OUTPATIENT
Start: 2017-06-26 | End: 2017-06-26 | Stop reason: HOSPADM

## 2017-06-26 RX ORDER — SODIUM CHLORIDE 0.9 % (FLUSH) 0.9 %
10 SYRINGE (ML) INJECTION
Status: CANCELLED | OUTPATIENT
Start: 2017-06-26

## 2017-06-26 RX ORDER — DOXORUBICIN HYDROCHLORIDE 2 MG/ML
60 INJECTION, SOLUTION INTRAVENOUS ONCE
Status: CANCELLED | OUTPATIENT
Start: 2017-06-26

## 2017-06-26 RX ORDER — DOXORUBICIN HYDROCHLORIDE 2 MG/ML
60 INJECTION, SOLUTION INTRAVENOUS ONCE
Status: COMPLETED | OUTPATIENT
Start: 2017-06-26 | End: 2017-06-26

## 2017-06-26 RX ADMIN — PEGFILGRASTIM 6 MG: KIT SUBCUTANEOUS at 01:06

## 2017-06-26 RX ADMIN — SODIUM CHLORIDE 150 MG: 9 INJECTION, SOLUTION INTRAVENOUS at 11:06

## 2017-06-26 RX ADMIN — PALONOSETRON HYDROCHLORIDE: 0.25 INJECTION INTRAVENOUS at 11:06

## 2017-06-26 RX ADMIN — CYCLOPHOSPHAMIDE 1040 MG: 1 INJECTION, POWDER, FOR SOLUTION INTRAVENOUS; ORAL at 12:06

## 2017-06-26 RX ADMIN — DOXORUBICIN HYDROCHLORIDE 104 MG: 2 INJECTION, SOLUTION INTRAVENOUS at 12:06

## 2017-06-26 RX ADMIN — SODIUM CHLORIDE: 0.9 INJECTION, SOLUTION INTRAVENOUS at 11:06

## 2017-06-26 NOTE — PROGRESS NOTES
HISTORY OF PRESENT ILLNESS:  The patient is a 26-year-old white female well   known to me for infiltrating ductal carcinoma of left breast, who is receiving   neoadjuvant therapy consisting of Adriamycin/Cytoxan and returns to clinic prior   to cycle 3 of therapy.  She has difficulty with visual disturbance on MD3 -   b.i.d., but no difficulty with emesis.  No other complaints or pertinent   findings on a 14-point review of systems with the exception of generalized   fatigue.    PHYSICAL EXAMINATION:  GENERAL:  Well-developed, well-nourished white female in no acute distress.  VITAL SIGNS:  Weight 160 pounds (increased by 2 pounds).  HEENT:  Normocephalic, atraumatic.  Oral mucosa pink and moist.  Lips without   lesions.  Tongue midline.  Oropharynx clear.  Nonicteric sclerae.  NECK:  Supple, no adenopathy.  No carotid bruits, thyromegaly or thyroid nodule.  HEART:  Regular rate and rhythm without murmur, gallop or rub.  LUNGS:  Clear to auscultation bilaterally.  Normal respiratory effort.  ABDOMEN:  Soft, nontender, nondistended with positive normoactive bowel sounds,   no hepatosplenomegaly.  EXTREMITIES:  No cyanosis, clubbing or edema.  Distal pulses are intact.  AXILLAE AND GROIN:  The left axillary lymph node is no longer palpable to exam.  NEUROLOGIC:  Cranial nerves II-XII grossly intact.  Motor:  Good muscle bulk and   tone.  Strength/sensory 5/5 throughout.  Gait stable.   BREASTS:  The patient's left breast mass is diminishing in size and firmness   compared to prior examinations.    LABORATORY:  White count 5.6, H and H 11.1 and 32.2, platelets 295.  Sodium 143,   potassium 3.7, chloride 106, CO2 of 29, BUN 13, creatinine 0.7, glucose 84,   calcium 8.8, mag is 2.  Liver function tests are within normal limits.  LDH is   397.  GFR is greater than 60.    IMPRESSION:  1.  Stage II-B infiltrating ductal carcinoma of the left breast, responding   favorably to neoadjuvant therapy.  2.  Visual disturbance due  to corticosteroids.  3.  Indeterminate right lower lobe pulmonary nodule.    PLAN:  1.  Proceed with third cycle of neoadjuvant chemotherapy to consist of   Adriamycin 125 mg IV push day 1 and Cytoxan 1040 mg IV piggyback day 1.  2.  The patient will receive DP 20/0.25, Emend, Ativan, MD3 - b.i.d. and p.r.n.   Compazine for control of acute and delayed emesis.  3.  Prophylactic Neulasta 6 mg subq With OBI for prevention of ricki-associated   sepsis and/or treatment delay.  4.  Return to clinic in three weeks with interval CBC, CMP, LDH and mag, at   which time we will proceed with fourth and final planned cycle of   Adriamycin/Cytoxan.      CHRIS/PN  dd: 06/26/2017 10:52:55 (CDT)  td: 06/26/2017 12:04:02 (CDT)  Doc ID   #[id number]  Job ID #[id number]    CC:

## 2017-07-17 ENCOUNTER — LAB VISIT (OUTPATIENT)
Dept: LAB | Facility: HOSPITAL | Age: 27
End: 2017-07-17
Attending: INTERNAL MEDICINE
Payer: COMMERCIAL

## 2017-07-17 ENCOUNTER — OFFICE VISIT (OUTPATIENT)
Dept: HEMATOLOGY/ONCOLOGY | Facility: CLINIC | Age: 27
End: 2017-07-17
Payer: COMMERCIAL

## 2017-07-17 ENCOUNTER — INFUSION (OUTPATIENT)
Dept: INFUSION THERAPY | Facility: HOSPITAL | Age: 27
End: 2017-07-17
Attending: INTERNAL MEDICINE
Payer: COMMERCIAL

## 2017-07-17 VITALS
HEART RATE: 79 BPM | BODY MASS INDEX: 28.56 KG/M2 | HEIGHT: 63 IN | WEIGHT: 161.19 LBS | TEMPERATURE: 98 F | RESPIRATION RATE: 16 BRPM | DIASTOLIC BLOOD PRESSURE: 61 MMHG | SYSTOLIC BLOOD PRESSURE: 102 MMHG

## 2017-07-17 VITALS
HEIGHT: 63 IN | TEMPERATURE: 98 F | RESPIRATION RATE: 16 BRPM | SYSTOLIC BLOOD PRESSURE: 97 MMHG | DIASTOLIC BLOOD PRESSURE: 62 MMHG | HEART RATE: 94 BPM | BODY MASS INDEX: 28.56 KG/M2 | WEIGHT: 161.19 LBS

## 2017-07-17 DIAGNOSIS — R91.1 PULMONARY NODULE: ICD-10-CM

## 2017-07-17 DIAGNOSIS — C77.9 REGIONAL LYMPH NODE METASTASIS PRESENT: ICD-10-CM

## 2017-07-17 DIAGNOSIS — Z17.0 MALIGNANT NEOPLASM OF UPPER-INNER QUADRANT OF LEFT BREAST IN FEMALE, ESTROGEN RECEPTOR POSITIVE: Primary | ICD-10-CM

## 2017-07-17 DIAGNOSIS — C50.212 MALIGNANT NEOPLASM OF UPPER-INNER QUADRANT OF LEFT FEMALE BREAST, UNSPECIFIED ESTROGEN RECEPTOR STATUS: Primary | ICD-10-CM

## 2017-07-17 DIAGNOSIS — C50.212 MALIGNANT NEOPLASM OF UPPER-INNER QUADRANT OF LEFT BREAST IN FEMALE, ESTROGEN RECEPTOR POSITIVE: Primary | ICD-10-CM

## 2017-07-17 DIAGNOSIS — C50.212 MALIGNANT NEOPLASM OF UPPER-INNER QUADRANT OF LEFT FEMALE BREAST: ICD-10-CM

## 2017-07-17 LAB
ALBUMIN SERPL BCP-MCNC: 4.3 G/DL
ALP SERPL-CCNC: 64 U/L
ALT SERPL W/O P-5'-P-CCNC: 22 U/L
ANION GAP SERPL CALC-SCNC: 7 MMOL/L
AST SERPL-CCNC: 23 U/L
BASOPHILS # BLD AUTO: 0.03 K/UL
BASOPHILS NFR BLD: 0.6 %
BILIRUB SERPL-MCNC: 0.5 MG/DL
BUN SERPL-MCNC: 12 MG/DL
CALCIUM SERPL-MCNC: 9.1 MG/DL
CHLORIDE SERPL-SCNC: 106 MMOL/L
CO2 SERPL-SCNC: 28 MMOL/L
CREAT SERPL-MCNC: 0.68 MG/DL
DIFFERENTIAL METHOD: ABNORMAL
EOSINOPHIL # BLD AUTO: 0.1 K/UL
EOSINOPHIL NFR BLD: 1 %
ERYTHROCYTE [DISTWIDTH] IN BLOOD BY AUTOMATED COUNT: 14.8 %
EST. GFR  (AFRICAN AMERICAN): >60 ML/MIN/1.73 M^2
EST. GFR  (NON AFRICAN AMERICAN): >60 ML/MIN/1.73 M^2
GLUCOSE SERPL-MCNC: 86 MG/DL
HCT VFR BLD AUTO: 31.1 %
HGB BLD-MCNC: 10.6 G/DL
LDH SERPL L TO P-CCNC: 402 U/L
LYMPHOCYTES # BLD AUTO: 1.1 K/UL
LYMPHOCYTES NFR BLD: 21.8 %
MAGNESIUM SERPL-MCNC: 1.8 MG/DL
MCH RBC QN AUTO: 31.6 PG
MCHC RBC AUTO-ENTMCNC: 34.1 %
MCV RBC AUTO: 93 FL
MONOCYTES # BLD AUTO: 0.6 K/UL
MONOCYTES NFR BLD: 11.5 %
NEUTROPHILS # BLD AUTO: 3.4 K/UL
NEUTROPHILS NFR BLD: 65.1 %
NRBC BLD-RTO: 0 /100 WBC
PLATELET # BLD AUTO: 254 K/UL
PMV BLD AUTO: 8.9 FL
POTASSIUM SERPL-SCNC: 4.3 MMOL/L
PROT SERPL-MCNC: 7.3 G/DL
RBC # BLD AUTO: 3.35 M/UL
SODIUM SERPL-SCNC: 141 MMOL/L
WBC # BLD AUTO: 5.24 K/UL

## 2017-07-17 PROCEDURE — 25000003 PHARM REV CODE 250: Mod: PN | Performed by: INTERNAL MEDICINE

## 2017-07-17 PROCEDURE — 99214 OFFICE O/P EST MOD 30 MIN: CPT | Mod: S$GLB,,, | Performed by: INTERNAL MEDICINE

## 2017-07-17 PROCEDURE — 96375 TX/PRO/DX INJ NEW DRUG ADDON: CPT | Mod: PN

## 2017-07-17 PROCEDURE — 63600175 PHARM REV CODE 636 W HCPCS: Mod: PN | Performed by: INTERNAL MEDICINE

## 2017-07-17 PROCEDURE — 85025 COMPLETE CBC W/AUTO DIFF WBC: CPT

## 2017-07-17 PROCEDURE — 96377 APPLICATON ON-BODY INJECTOR: CPT | Mod: PN

## 2017-07-17 PROCEDURE — 99999 PR PBB SHADOW E&M-EST. PATIENT-LVL III: CPT | Mod: PBBFAC,,, | Performed by: INTERNAL MEDICINE

## 2017-07-17 PROCEDURE — 83615 LACTATE (LD) (LDH) ENZYME: CPT

## 2017-07-17 PROCEDURE — 80053 COMPREHEN METABOLIC PANEL: CPT

## 2017-07-17 PROCEDURE — 96367 TX/PROPH/DG ADDL SEQ IV INF: CPT | Mod: PN

## 2017-07-17 PROCEDURE — 36415 COLL VENOUS BLD VENIPUNCTURE: CPT | Mod: PN

## 2017-07-17 PROCEDURE — 96411 CHEMO IV PUSH ADDL DRUG: CPT | Mod: PN

## 2017-07-17 PROCEDURE — 96413 CHEMO IV INFUSION 1 HR: CPT | Mod: PN

## 2017-07-17 PROCEDURE — 83735 ASSAY OF MAGNESIUM: CPT | Mod: PN

## 2017-07-17 PROCEDURE — 83735 ASSAY OF MAGNESIUM: CPT

## 2017-07-17 PROCEDURE — 85025 COMPLETE CBC W/AUTO DIFF WBC: CPT | Mod: PN

## 2017-07-17 PROCEDURE — 80053 COMPREHEN METABOLIC PANEL: CPT | Mod: PN

## 2017-07-17 PROCEDURE — 83615 LACTATE (LD) (LDH) ENZYME: CPT | Mod: PN

## 2017-07-17 RX ORDER — HEPARIN 100 UNIT/ML
500 SYRINGE INTRAVENOUS
Status: CANCELLED | OUTPATIENT
Start: 2017-07-17

## 2017-07-17 RX ORDER — DOXORUBICIN HYDROCHLORIDE 2 MG/ML
60 INJECTION, SOLUTION INTRAVENOUS ONCE
Status: CANCELLED | OUTPATIENT
Start: 2017-07-17

## 2017-07-17 RX ORDER — SODIUM CHLORIDE 0.9 % (FLUSH) 0.9 %
10 SYRINGE (ML) INJECTION
Status: DISCONTINUED | OUTPATIENT
Start: 2017-07-17 | End: 2017-07-17 | Stop reason: HOSPADM

## 2017-07-17 RX ORDER — DOXORUBICIN HYDROCHLORIDE 2 MG/ML
60 INJECTION, SOLUTION INTRAVENOUS
Status: COMPLETED | OUTPATIENT
Start: 2017-07-17 | End: 2017-07-17

## 2017-07-17 RX ORDER — LORAZEPAM 2 MG/ML
1 INJECTION INTRAMUSCULAR ONCE
Status: CANCELLED
Start: 2017-07-17 | End: 2017-07-17

## 2017-07-17 RX ORDER — HEPARIN 100 UNIT/ML
500 SYRINGE INTRAVENOUS
Status: DISCONTINUED | OUTPATIENT
Start: 2017-07-17 | End: 2017-07-17 | Stop reason: HOSPADM

## 2017-07-17 RX ORDER — SODIUM CHLORIDE 0.9 % (FLUSH) 0.9 %
10 SYRINGE (ML) INJECTION
Status: CANCELLED | OUTPATIENT
Start: 2017-07-17

## 2017-07-17 RX ORDER — LORAZEPAM 2 MG/ML
1 INJECTION INTRAMUSCULAR ONCE
Status: COMPLETED | OUTPATIENT
Start: 2017-07-17 | End: 2017-07-17

## 2017-07-17 RX ADMIN — Medication 10 ML: at 11:07

## 2017-07-17 RX ADMIN — PEGFILGRASTIM 6 MG: KIT SUBCUTANEOUS at 01:07

## 2017-07-17 RX ADMIN — LORAZEPAM 1 MG: 2 INJECTION INTRAMUSCULAR; INTRAVENOUS at 11:07

## 2017-07-17 RX ADMIN — HEPARIN SODIUM (PORCINE) LOCK FLUSH IV SOLN 100 UNIT/ML 500 UNITS: 100 SOLUTION at 01:07

## 2017-07-17 RX ADMIN — PALONOSETRON HYDROCHLORIDE: 0.25 INJECTION INTRAVENOUS at 11:07

## 2017-07-17 RX ADMIN — SODIUM CHLORIDE: 0.9 INJECTION, SOLUTION INTRAVENOUS at 11:07

## 2017-07-17 RX ADMIN — DOXORUBICIN HYDROCHLORIDE 104 MG: 2 INJECTION, SOLUTION INTRAVENOUS at 12:07

## 2017-07-17 RX ADMIN — SODIUM CHLORIDE 150 MG: 9 INJECTION, SOLUTION INTRAVENOUS at 12:07

## 2017-07-17 RX ADMIN — CYCLOPHOSPHAMIDE 1040 MG: 1 INJECTION, POWDER, FOR SOLUTION INTRAVENOUS; ORAL at 01:07

## 2017-07-17 NOTE — PLAN OF CARE
Problem: Patient Care Overview (Adult)  Goal: Discharge Needs Assessment  Adequate for discharge.   Pt tolerated infusion without noted distress.  Reviewed upcoming appointments.  All questions answered.  Ambulated from infusion with friend. Selected site cleaned with alcohol wipe and allowed to dry.  Filled OBI as directed.    Placed securely to rt arm.  Full status noted and activation lite flashing.   Noted cannula deployed/status light turned green.  Reviewed teaching of Neulasta OBI and information of who to contact if any leakage or malfunction of device were to occur.  Pt verbalized an understanding.

## 2017-07-17 NOTE — PROGRESS NOTES
HISTORY OF PRESENT ILLNESS:  The patient is a 26-year-old white female well   known to me for infiltrating ductal carcinoma of the left breast who is being   treated neoadjuvantly with Adriamycin/Cytoxan and returns to clinic for fourth   planned cycle of therapy.  She did not complete MD3 - b.i.d. with her last cycle   of chemotherapy due to her ongoing difficulties with vision while taking this   regimen.  Her nausea was more persistent, but she was able to complete cycle without   emesis.  Her only other complaint is that of a 3 pound weight gain.  She   continues to menstruate.  No other complaints or pertinent findings on a   14-point review of systems.    PHYSICAL EXAMINATION:  GENERAL:  The patient is a well-developed, well-nourished white female in no   acute distress.  VITAL SIGNS:  With a weight of 161 pounds (increased by 1 pound).  HEENT:  Total alopecia.  Normocephalic, atraumatic.  Oral mucosa pink and moist.    Lips without lesions.  Tongue midline.  Oropharynx clear.  Nonicteric sclerae.  NECK:  Supple, no adenopathy.  No carotid bruits, thyromegaly or thyroid nodule.  HEART:  Regular rate and rhythm without murmur, gallop or rub.  LUNGS:  Clear to auscultation bilaterally.  Normal respiratory effort.  ABDOMEN:  Soft, nontender, nondistended with positive normoactive bowel sounds,   no hepatosplenomegaly.  EXTREMITIES:  No cyanosis, clubbing or edema.  Distal pulses are intact.  AXILLAE AND GROIN:  Left axillary lymph node with biopsy proven metastasis still   faintly palpable on today's exam.  SKIN:  Intact/turgor normal.  NEUROLOGIC:  Cranial nerves II-XII grossly intact.  Motor:  Good muscle bulk and   tone.  Strength/sensory 5/5 throughout.  Gait stable.  BREASTS:  The patient's left breast tumor continues to diminish in size compared   to prior examination.    LABORATORY:  White count 5.2, H and H 10.6 and 31.1 and platelets of 254.    Sodium 141, potassium 4.3, chloride 106, CO2 28, BUN 12,  creatinine 0.7, glucose   86, calcium 9.1, mag 1.8.  Liver function tests are within normal limits.  LDH   is 402.  GFR is greater than 60.    IMPRESSION:  1.  Stage II infiltrating ductal carcinoma of the left breast, responding   favorably to neoadjuvant therapy.  2.  Visual disturbances due to corticosteroids - currently asymptomatic.  3.  Indeterminate right lower lobe pulmonary nodule.    PLAN:  1.  Proceed with fourth cycle of neoadjuvant therapy to consist of Adriamycin   125 mg IV day one and Cytoxan 1040 mg IV piggyback day one.  2.  The patient will receive DP 20/0.25, Emend, Ativan, MD3 - b.i.d., p.r.n.   Compazine for control of acute and delayed emesis.  3.  Prophylactic Neulasta 6 mg subQ via OBI for prevention of ricki-associated   sepsis and/or treatment delay.  4.  Return to clinic in three weeks with interval CBC, CMP, LDH and mag.  At   which time, we will initiate first of a planned four cycles of neoadjuvant   Taxotere.      OPAL  dd: 07/17/2017 11:28:33 (CDT)  td: 07/17/2017 17:47:37 (CDT)  Doc ID   #[id number]  Job ID #[id number]    CC:

## 2017-07-26 ENCOUNTER — OFFICE VISIT (OUTPATIENT)
Dept: HEMATOLOGY/ONCOLOGY | Facility: CLINIC | Age: 27
End: 2017-07-26
Payer: COMMERCIAL

## 2017-07-26 ENCOUNTER — PATIENT MESSAGE (OUTPATIENT)
Dept: HEMATOLOGY/ONCOLOGY | Facility: CLINIC | Age: 27
End: 2017-07-26

## 2017-07-26 ENCOUNTER — LAB VISIT (OUTPATIENT)
Dept: LAB | Facility: HOSPITAL | Age: 27
End: 2017-07-26
Attending: INTERNAL MEDICINE
Payer: COMMERCIAL

## 2017-07-26 ENCOUNTER — TELEPHONE (OUTPATIENT)
Dept: HEMATOLOGY/ONCOLOGY | Facility: CLINIC | Age: 27
End: 2017-07-26

## 2017-07-26 VITALS — TEMPERATURE: 99 F | HEIGHT: 63 IN | WEIGHT: 161.19 LBS | BODY MASS INDEX: 28.56 KG/M2 | RESPIRATION RATE: 16 BRPM

## 2017-07-26 DIAGNOSIS — T45.1X5A ANTINEOPLASTIC CHEMOTHERAPY INDUCED ANEMIA: ICD-10-CM

## 2017-07-26 DIAGNOSIS — D70.1 CHEMOTHERAPY INDUCED NEUTROPENIA: ICD-10-CM

## 2017-07-26 DIAGNOSIS — R30.0 DYSURIA: ICD-10-CM

## 2017-07-26 DIAGNOSIS — R11.0 NAUSEA: ICD-10-CM

## 2017-07-26 DIAGNOSIS — Z17.0 MALIGNANT NEOPLASM OF UPPER-INNER QUADRANT OF LEFT BREAST IN FEMALE, ESTROGEN RECEPTOR POSITIVE: Primary | ICD-10-CM

## 2017-07-26 DIAGNOSIS — T45.1X5A CHEMOTHERAPY INDUCED NEUTROPENIA: ICD-10-CM

## 2017-07-26 DIAGNOSIS — C77.9 REGIONAL LYMPH NODE METASTASIS PRESENT: ICD-10-CM

## 2017-07-26 DIAGNOSIS — R50.9 FEVER, UNSPECIFIED FEVER CAUSE: ICD-10-CM

## 2017-07-26 DIAGNOSIS — D64.81 ANTINEOPLASTIC CHEMOTHERAPY INDUCED ANEMIA: ICD-10-CM

## 2017-07-26 DIAGNOSIS — C50.011 MALIGNANT NEOPLASM OF NIPPLE OF RIGHT BREAST IN FEMALE, UNSPECIFIED ESTROGEN RECEPTOR STATUS: ICD-10-CM

## 2017-07-26 DIAGNOSIS — E86.0 DEHYDRATION: ICD-10-CM

## 2017-07-26 DIAGNOSIS — C50.212 MALIGNANT NEOPLASM OF UPPER-INNER QUADRANT OF LEFT BREAST IN FEMALE, ESTROGEN RECEPTOR POSITIVE: Primary | ICD-10-CM

## 2017-07-26 DIAGNOSIS — E86.0 DEHYDRATION: Primary | ICD-10-CM

## 2017-07-26 DIAGNOSIS — R50.9 FEVER, UNSPECIFIED FEVER CAUSE: Primary | ICD-10-CM

## 2017-07-26 PROBLEM — D69.59 CHEMOTHERAPY-INDUCED THROMBOCYTOPENIA: Status: ACTIVE | Noted: 2017-07-26

## 2017-07-26 LAB
ALBUMIN SERPL BCP-MCNC: 4.5 G/DL
ALP SERPL-CCNC: 74 U/L
ALT SERPL W/O P-5'-P-CCNC: 15 U/L
ANION GAP SERPL CALC-SCNC: 10 MMOL/L
AST SERPL-CCNC: 18 U/L
BASOPHILS # BLD AUTO: 0.04 K/UL
BASOPHILS NFR BLD: 1.6 %
BILIRUB SERPL-MCNC: 0.4 MG/DL
BILIRUB UR QL STRIP: NEGATIVE
BUN SERPL-MCNC: 10 MG/DL
CALCIUM SERPL-MCNC: 9.4 MG/DL
CHLORIDE SERPL-SCNC: 99 MMOL/L
CLARITY UR: CLEAR
CO2 SERPL-SCNC: 29 MMOL/L
COLOR UR: YELLOW
CREAT SERPL-MCNC: 0.65 MG/DL
DIFFERENTIAL METHOD: ABNORMAL
EOSINOPHIL # BLD AUTO: 0.1 K/UL
EOSINOPHIL NFR BLD: 2.4 %
ERYTHROCYTE [DISTWIDTH] IN BLOOD BY AUTOMATED COUNT: 13.4 %
EST. GFR  (AFRICAN AMERICAN): >60 ML/MIN/1.73 M^2
EST. GFR  (NON AFRICAN AMERICAN): >60 ML/MIN/1.73 M^2
GLUCOSE SERPL-MCNC: 103 MG/DL
GLUCOSE UR QL STRIP: NEGATIVE
HCT VFR BLD AUTO: 26.7 %
HGB BLD-MCNC: 9.4 G/DL
HGB UR QL STRIP: NEGATIVE
KETONES UR QL STRIP: NEGATIVE
LDH SERPL L TO P-CCNC: 316 U/L
LEUKOCYTE ESTERASE UR QL STRIP: NEGATIVE
LYMPHOCYTES # BLD AUTO: 0.9 K/UL
LYMPHOCYTES NFR BLD: 34.6 %
MAGNESIUM SERPL-MCNC: 1.8 MG/DL
MCH RBC QN AUTO: 32.4 PG
MCHC RBC AUTO-ENTMCNC: 35.2 G/DL
MCV RBC AUTO: 92 FL
MONOCYTES # BLD AUTO: 0.5 K/UL
MONOCYTES NFR BLD: 19.1 %
NEUTROPHILS # BLD AUTO: 1 K/UL
NEUTROPHILS NFR BLD: 42.3 %
NITRITE UR QL STRIP: NEGATIVE
NRBC BLD-RTO: 0 /100 WBC
PH UR STRIP: 8 [PH] (ref 5–8)
PLATELET # BLD AUTO: 76 K/UL
PMV BLD AUTO: 9.6 FL
POTASSIUM SERPL-SCNC: 3.9 MMOL/L
PROT SERPL-MCNC: 7.5 G/DL
PROT UR QL STRIP: NEGATIVE
RBC # BLD AUTO: 2.9 M/UL
SODIUM SERPL-SCNC: 138 MMOL/L
SP GR UR STRIP: 1.01 (ref 1–1.03)
URN SPEC COLLECT METH UR: NORMAL
UROBILINOGEN UR STRIP-ACNC: NEGATIVE EU/DL
WBC # BLD AUTO: 2.46 K/UL

## 2017-07-26 PROCEDURE — 83615 LACTATE (LD) (LDH) ENZYME: CPT | Mod: PN

## 2017-07-26 PROCEDURE — 81003 URINALYSIS AUTO W/O SCOPE: CPT | Mod: PN

## 2017-07-26 PROCEDURE — 83735 ASSAY OF MAGNESIUM: CPT

## 2017-07-26 PROCEDURE — 85025 COMPLETE CBC W/AUTO DIFF WBC: CPT

## 2017-07-26 PROCEDURE — 83735 ASSAY OF MAGNESIUM: CPT | Mod: PN

## 2017-07-26 PROCEDURE — 80053 COMPREHEN METABOLIC PANEL: CPT

## 2017-07-26 PROCEDURE — 85025 COMPLETE CBC W/AUTO DIFF WBC: CPT | Mod: PN

## 2017-07-26 PROCEDURE — 87086 URINE CULTURE/COLONY COUNT: CPT | Mod: PN

## 2017-07-26 PROCEDURE — 80053 COMPREHEN METABOLIC PANEL: CPT | Mod: PN

## 2017-07-26 PROCEDURE — 81003 URINALYSIS AUTO W/O SCOPE: CPT

## 2017-07-26 PROCEDURE — 36415 COLL VENOUS BLD VENIPUNCTURE: CPT | Mod: PN

## 2017-07-26 PROCEDURE — 99999 PR PBB SHADOW E&M-EST. PATIENT-LVL II: CPT | Mod: PBBFAC,,, | Performed by: INTERNAL MEDICINE

## 2017-07-26 PROCEDURE — 99499 UNLISTED E&M SERVICE: CPT | Mod: S$GLB,,, | Performed by: INTERNAL MEDICINE

## 2017-07-26 PROCEDURE — 83615 LACTATE (LD) (LDH) ENZYME: CPT

## 2017-07-26 PROCEDURE — 87086 URINE CULTURE/COLONY COUNT: CPT

## 2017-07-26 RX ORDER — DESVENLAFAXINE SUCCINATE 50 MG/1
50 TABLET, EXTENDED RELEASE ORAL DAILY
Status: CANCELLED | OUTPATIENT
Start: 2017-07-27

## 2017-07-26 RX ORDER — IBUPROFEN 600 MG/1
600 TABLET ORAL EVERY 6 HOURS PRN
Status: CANCELLED | OUTPATIENT
Start: 2017-07-26

## 2017-07-26 RX ORDER — ALPRAZOLAM 0.25 MG/1
0.25 TABLET ORAL 3 TIMES DAILY PRN
Status: CANCELLED | OUTPATIENT
Start: 2017-07-26

## 2017-07-26 RX ORDER — OXYCODONE HYDROCHLORIDE 5 MG/1
10 TABLET ORAL EVERY 4 HOURS PRN
Status: CANCELLED | OUTPATIENT
Start: 2017-07-26

## 2017-07-26 RX ORDER — ONDANSETRON 8 MG/1
8 TABLET, ORALLY DISINTEGRATING ORAL EVERY 8 HOURS PRN
Status: CANCELLED | OUTPATIENT
Start: 2017-07-26

## 2017-07-26 RX ORDER — BISACODYL 5 MG
5 TABLET, DELAYED RELEASE (ENTERIC COATED) ORAL DAILY PRN
Status: CANCELLED | OUTPATIENT
Start: 2017-07-26

## 2017-07-26 RX ORDER — SODIUM CHLORIDE 9 MG/ML
INJECTION, SOLUTION INTRAVENOUS CONTINUOUS
Status: CANCELLED | OUTPATIENT
Start: 2017-07-26

## 2017-07-26 RX ORDER — ACETAMINOPHEN 500 MG
1000 TABLET ORAL EVERY 8 HOURS PRN
Status: CANCELLED | OUTPATIENT
Start: 2017-07-26

## 2017-07-26 RX ORDER — PANTOPRAZOLE SODIUM 40 MG/1
40 TABLET, DELAYED RELEASE ORAL DAILY
Status: CANCELLED | OUTPATIENT
Start: 2017-07-27

## 2017-07-26 RX ORDER — RAMELTEON 8 MG/1
8 TABLET ORAL NIGHTLY PRN
Status: CANCELLED | OUTPATIENT
Start: 2017-07-26

## 2017-07-26 RX ORDER — HYDROCODONE BITARTRATE AND ACETAMINOPHEN 5; 325 MG/1; MG/1
1 TABLET ORAL EVERY 4 HOURS PRN
Status: CANCELLED | OUTPATIENT
Start: 2017-07-26

## 2017-07-26 RX ORDER — BISACODYL 5 MG
5 TABLET, DELAYED RELEASE (ENTERIC COATED) ORAL DAILY PRN
COMMUNITY
End: 2022-07-20

## 2017-07-26 RX ORDER — ENOXAPARIN SODIUM 100 MG/ML
40 INJECTION SUBCUTANEOUS EVERY 24 HOURS
Status: CANCELLED | OUTPATIENT
Start: 2017-07-26

## 2017-07-26 NOTE — TELEPHONE ENCOUNTER
Received call from patient's friend, Aliyah who is with patient, stating that patient has temp of 99.8, nausea, not feeling well, almost lathargic in her words. Discussed with Dr. Gaming and advised that Aliyah bring patient to the clinic to be seen and to stop downstairs at the lab draw stating first for tests. Aliyah voiced understanding and appreciation.

## 2017-07-26 NOTE — TELEPHONE ENCOUNTER
----- Message from Olive Nesbitt sent at 7/26/2017 12:17 PM CDT -----  Contact: friend-Neighbor Aliyah Villa  Call connected to pod.   Patient has fever and weak/nauseated.  [phone] (home)

## 2017-07-28 LAB — BACTERIA UR CULT: NO GROWTH

## 2017-08-07 ENCOUNTER — OFFICE VISIT (OUTPATIENT)
Dept: HEMATOLOGY/ONCOLOGY | Facility: CLINIC | Age: 27
End: 2017-08-07
Payer: COMMERCIAL

## 2017-08-07 ENCOUNTER — INFUSION (OUTPATIENT)
Dept: INFUSION THERAPY | Facility: HOSPITAL | Age: 27
End: 2017-08-07
Attending: INTERNAL MEDICINE
Payer: COMMERCIAL

## 2017-08-07 VITALS
WEIGHT: 161.63 LBS | BODY MASS INDEX: 28.64 KG/M2 | HEART RATE: 73 BPM | SYSTOLIC BLOOD PRESSURE: 94 MMHG | DIASTOLIC BLOOD PRESSURE: 59 MMHG | RESPIRATION RATE: 16 BRPM | HEIGHT: 63 IN | TEMPERATURE: 98 F

## 2017-08-07 VITALS
SYSTOLIC BLOOD PRESSURE: 106 MMHG | WEIGHT: 161.63 LBS | RESPIRATION RATE: 16 BRPM | DIASTOLIC BLOOD PRESSURE: 75 MMHG | HEIGHT: 63 IN | TEMPERATURE: 98 F | BODY MASS INDEX: 28.64 KG/M2 | HEART RATE: 89 BPM

## 2017-08-07 DIAGNOSIS — C50.212 MALIGNANT NEOPLASM OF UPPER-INNER QUADRANT OF LEFT FEMALE BREAST, UNSPECIFIED ESTROGEN RECEPTOR STATUS: Primary | ICD-10-CM

## 2017-08-07 DIAGNOSIS — R91.1 PULMONARY NODULE: ICD-10-CM

## 2017-08-07 DIAGNOSIS — C77.9 REGIONAL LYMPH NODE METASTASIS PRESENT: ICD-10-CM

## 2017-08-07 DIAGNOSIS — C50.212 MALIGNANT NEOPLASM OF UPPER-INNER QUADRANT OF LEFT BREAST IN FEMALE, ESTROGEN RECEPTOR POSITIVE: Primary | ICD-10-CM

## 2017-08-07 DIAGNOSIS — Z17.0 MALIGNANT NEOPLASM OF UPPER-INNER QUADRANT OF LEFT BREAST IN FEMALE, ESTROGEN RECEPTOR POSITIVE: Primary | ICD-10-CM

## 2017-08-07 DIAGNOSIS — T45.1X5A CHEMOTHERAPY INDUCED NEUTROPENIA: ICD-10-CM

## 2017-08-07 DIAGNOSIS — D70.1 CHEMOTHERAPY INDUCED NEUTROPENIA: ICD-10-CM

## 2017-08-07 PROCEDURE — 3008F BODY MASS INDEX DOCD: CPT | Mod: S$GLB,,, | Performed by: INTERNAL MEDICINE

## 2017-08-07 PROCEDURE — 96377 APPLICATON ON-BODY INJECTOR: CPT | Mod: PN

## 2017-08-07 PROCEDURE — A4216 STERILE WATER/SALINE, 10 ML: HCPCS | Mod: PN | Performed by: INTERNAL MEDICINE

## 2017-08-07 PROCEDURE — 96367 TX/PROPH/DG ADDL SEQ IV INF: CPT | Mod: PN

## 2017-08-07 PROCEDURE — 96413 CHEMO IV INFUSION 1 HR: CPT | Mod: PN

## 2017-08-07 PROCEDURE — 99999 PR PBB SHADOW E&M-EST. PATIENT-LVL III: CPT | Mod: PBBFAC,,, | Performed by: INTERNAL MEDICINE

## 2017-08-07 PROCEDURE — 25000003 PHARM REV CODE 250: Mod: PN | Performed by: INTERNAL MEDICINE

## 2017-08-07 PROCEDURE — 99214 OFFICE O/P EST MOD 30 MIN: CPT | Mod: S$GLB,,, | Performed by: INTERNAL MEDICINE

## 2017-08-07 PROCEDURE — 63600175 PHARM REV CODE 636 W HCPCS: Mod: PN | Performed by: INTERNAL MEDICINE

## 2017-08-07 RX ORDER — SODIUM CHLORIDE 0.9 % (FLUSH) 0.9 %
10 SYRINGE (ML) INJECTION
Status: DISCONTINUED | OUTPATIENT
Start: 2017-08-07 | End: 2017-08-07 | Stop reason: HOSPADM

## 2017-08-07 RX ORDER — SODIUM CHLORIDE 0.9 % (FLUSH) 0.9 %
10 SYRINGE (ML) INJECTION
Status: CANCELLED | OUTPATIENT
Start: 2017-08-07

## 2017-08-07 RX ORDER — HEPARIN 100 UNIT/ML
500 SYRINGE INTRAVENOUS
Status: CANCELLED | OUTPATIENT
Start: 2017-08-07

## 2017-08-07 RX ORDER — HEPARIN 100 UNIT/ML
500 SYRINGE INTRAVENOUS
Status: DISCONTINUED | OUTPATIENT
Start: 2017-08-07 | End: 2017-08-07 | Stop reason: HOSPADM

## 2017-08-07 RX ADMIN — SODIUM CHLORIDE 150 MG: 9 INJECTION, SOLUTION INTRAVENOUS at 11:08

## 2017-08-07 RX ADMIN — PEGFILGRASTIM 6 MG: KIT SUBCUTANEOUS at 01:08

## 2017-08-07 RX ADMIN — SODIUM CHLORIDE, PRESERVATIVE FREE 10 ML: 5 INJECTION INTRAVENOUS at 10:08

## 2017-08-07 RX ADMIN — DIPHENHYDRAMINE HYDROCHLORIDE 25 MG: 50 INJECTION, SOLUTION INTRAMUSCULAR; INTRAVENOUS at 11:08

## 2017-08-07 RX ADMIN — HEPARIN 500 UNITS: 100 SYRINGE at 01:08

## 2017-08-07 RX ADMIN — DOCETAXEL 175 MG: 20 INJECTION, SOLUTION, CONCENTRATE INTRAVENOUS at 12:08

## 2017-08-07 RX ADMIN — PALONOSETRON HYDROCHLORIDE: 0.25 INJECTION INTRAVENOUS at 10:08

## 2017-08-07 RX ADMIN — SODIUM CHLORIDE: 0.9 INJECTION, SOLUTION INTRAVENOUS at 10:08

## 2017-08-07 NOTE — PROGRESS NOTES
HISTORY OF PRESENT ILLNESS:  The patient is a 26-year-old white female well   known to me for locally advanced stage IIB infiltrating left breast carcinoma.    The patient has completed four cycles of neoadjuvant Adriamycin/Cytoxan and had   a favorable response.  The patient returns to the clinic today having been   hospitalized with severe chemotherapy-associated anemia that required   transfusion.  She was not iron deficient.  The patient is scheduled to initiate   neoadjuvant Taxotere today.  No new complaints or pertinent findings on a   14-point review of systems.    PHYSICAL EXAMINATION:  GENERAL:  Well-developed, well-nourished white female in no acute distress.  VITAL SIGNS:  Weight of 160-1/2 pounds.  HEENT:  Normocephalic, atraumatic.  Oral mucosa pink and moist.  Lips without   lesions.  Tongue midline.  Oropharynx clear.  Nonicteric sclerae.  NECK:  Supple, no adenopathy.  No carotid bruits, thyromegaly or thyroid nodule.  HEART:  Regular rate and rhythm without murmur, gallop or rub.  LUNGS:  Clear to auscultation bilaterally.  Normal respiratory effort.  ABDOMEN:  Soft, nontender, nondistended with positive normoactive bowel sounds,   no hepatosplenomegaly.  EXTREMITIES:  No cyanosis, clubbing or edema.  Distal pulses are intact.  AXILLAE AND GROIN:  No palpable pathologic lymphadenopathy is appreciated.  SKIN:  Intact/turgor normal.  NEUROLOGIC:  Cranial nerves II-XII grossly intact.  Motor:  Good muscle bulk and   tone.  Strength/sensory 5/5 throughout.  Gait stable.  BREASTS:  There remains residual palpable mass within the left breast, though   this is much smaller than at the time of initial presentation.  I can continue   to faintly palpate the left axillary lymph node, which was previously biopsied   and proven to contain metastatic tumor.    LABORATORY:  Sodium 141, potassium 4.2, chloride 105, CO2 of 27, BUN 14,   creatinine 0.7, glucose 87, calcium 9.3, mag 1.8.  Liver function tests are    within normal limits.  LDH is 450.  GFR is greater than 60.  CBC reveals a white   count of 4.5, H and H 11.8 and 34.8, platelets of 254.  ANC is 2500.    IMPRESSION:  1.  Locally advanced left breast carcinoma.  2.  Chemotherapy-associated anemia, currently well palliated.    PLAN:  1.  Proceed with cycle 1 of neoadjuvant Taxotere at a dose of 100 mg/m2 (175   mg).  2.  The patient will receive typical Benadryl premedication prior to Taxotere.  3.  The patient will receive DP 20/0.25, Emend, Ativan, p.r.n. Compazine for   control of acute and delayed emesis.  4.  Prophylactic Neulasta 6 mg subq for prevention of ricki-associated sepsis   and/or treatment delay.  5.  Return to clinic in three weeks with interval CBC, CMP, LDH and magnesium,   at which time, we will proceed with second of a planned four cycles of   neoadjuvant Taxotere.      CHRIS/PN  dd: 08/07/2017 09:41:46 (CDT)  td: 08/07/2017 12:06:44 (CDT)  Doc ID   #[id number]  Job ID #[id number]    CC:

## 2017-08-12 PROBLEM — R50.9 FEVER: Status: ACTIVE | Noted: 2017-08-12

## 2017-08-13 PROBLEM — R50.9 FUO (FEVER OF UNKNOWN ORIGIN): Status: ACTIVE | Noted: 2017-08-13

## 2017-08-13 PROBLEM — R65.10 SIRS (SYSTEMIC INFLAMMATORY RESPONSE SYNDROME): Status: ACTIVE | Noted: 2017-08-13

## 2017-08-13 PROBLEM — R79.89 ELEVATED LIVER FUNCTION TESTS: Status: ACTIVE | Noted: 2017-08-13

## 2017-08-13 PROBLEM — Z79.899 ON ANTINEOPLASTIC CHEMOTHERAPY: Status: ACTIVE | Noted: 2017-08-13

## 2017-08-14 DIAGNOSIS — D61.1 APLASTIC ANEMIA DUE TO DRUGS: Primary | ICD-10-CM

## 2017-08-22 ENCOUNTER — OFFICE VISIT (OUTPATIENT)
Dept: HEMATOLOGY/ONCOLOGY | Facility: CLINIC | Age: 27
End: 2017-08-22
Payer: COMMERCIAL

## 2017-08-22 ENCOUNTER — LAB VISIT (OUTPATIENT)
Dept: LAB | Facility: HOSPITAL | Age: 27
End: 2017-08-22
Attending: INTERNAL MEDICINE
Payer: COMMERCIAL

## 2017-08-22 VITALS
HEART RATE: 88 BPM | HEIGHT: 63 IN | RESPIRATION RATE: 16 BRPM | WEIGHT: 158.5 LBS | SYSTOLIC BLOOD PRESSURE: 98 MMHG | DIASTOLIC BLOOD PRESSURE: 66 MMHG | BODY MASS INDEX: 28.08 KG/M2 | TEMPERATURE: 99 F

## 2017-08-22 DIAGNOSIS — Z17.0 MALIGNANT NEOPLASM OF UPPER-INNER QUADRANT OF LEFT BREAST IN FEMALE, ESTROGEN RECEPTOR POSITIVE: Primary | ICD-10-CM

## 2017-08-22 DIAGNOSIS — C77.9 REGIONAL LYMPH NODE METASTASIS PRESENT: ICD-10-CM

## 2017-08-22 DIAGNOSIS — R91.1 PULMONARY NODULE: ICD-10-CM

## 2017-08-22 DIAGNOSIS — T45.1X5A CHEMOTHERAPY INDUCED NEUTROPENIA: ICD-10-CM

## 2017-08-22 DIAGNOSIS — D70.1 CHEMOTHERAPY-INDUCED NEUTROPENIA: Primary | ICD-10-CM

## 2017-08-22 DIAGNOSIS — C50.212 MALIGNANT NEOPLASM OF UPPER-INNER QUADRANT OF LEFT BREAST IN FEMALE, ESTROGEN RECEPTOR POSITIVE: Primary | ICD-10-CM

## 2017-08-22 DIAGNOSIS — D70.1 CHEMOTHERAPY INDUCED NEUTROPENIA: ICD-10-CM

## 2017-08-22 DIAGNOSIS — D61.1 APLASTIC ANEMIA DUE TO DRUGS: ICD-10-CM

## 2017-08-22 DIAGNOSIS — T45.1X5A CHEMOTHERAPY-INDUCED NEUTROPENIA: Primary | ICD-10-CM

## 2017-08-22 DIAGNOSIS — T45.1X5A CHEMOTHERAPY-INDUCED THROMBOCYTOPENIA: ICD-10-CM

## 2017-08-22 DIAGNOSIS — D69.59 CHEMOTHERAPY-INDUCED THROMBOCYTOPENIA: ICD-10-CM

## 2017-08-22 LAB
BASOPHILS NFR BLD: 1 %
DIFFERENTIAL METHOD: ABNORMAL
EOSINOPHIL NFR BLD: 1 %
ERYTHROCYTE [DISTWIDTH] IN BLOOD BY AUTOMATED COUNT: 14.3 %
HCT VFR BLD AUTO: 32.6 %
HGB BLD-MCNC: 11 G/DL
LYMPHOCYTES NFR BLD: 24 %
MCH RBC QN AUTO: 31.5 PG
MCHC RBC AUTO-ENTMCNC: 33.7 G/DL
MCV RBC AUTO: 93 FL
MONOCYTES NFR BLD: 2 %
NEUTROPHILS # BLD AUTO: 7 K/UL
NEUTROPHILS NFR BLD: 70 %
NEUTS BAND NFR BLD MANUAL: 2 %
NRBC BLD-RTO: 0 /100 WBC
PLATELET # BLD AUTO: 145 K/UL
PLATELET BLD QL SMEAR: ABNORMAL
PMV BLD AUTO: 9.6 FL
RBC # BLD AUTO: 3.49 M/UL
TOXIC GRANULES BLD QL SMEAR: PRESENT
WBC # BLD AUTO: 9.78 K/UL

## 2017-08-22 PROCEDURE — 99213 OFFICE O/P EST LOW 20 MIN: CPT | Mod: S$GLB,,, | Performed by: INTERNAL MEDICINE

## 2017-08-22 PROCEDURE — 85007 BL SMEAR W/DIFF WBC COUNT: CPT | Mod: PN

## 2017-08-22 PROCEDURE — 85007 BL SMEAR W/DIFF WBC COUNT: CPT

## 2017-08-22 PROCEDURE — 99999 PR PBB SHADOW E&M-EST. PATIENT-LVL III: CPT | Mod: PBBFAC,,, | Performed by: INTERNAL MEDICINE

## 2017-08-22 PROCEDURE — 85027 COMPLETE CBC AUTOMATED: CPT

## 2017-08-22 PROCEDURE — 85027 COMPLETE CBC AUTOMATED: CPT | Mod: PN

## 2017-08-22 PROCEDURE — 3008F BODY MASS INDEX DOCD: CPT | Mod: S$GLB,,, | Performed by: INTERNAL MEDICINE

## 2017-08-22 PROCEDURE — 36415 COLL VENOUS BLD VENIPUNCTURE: CPT | Mod: PN

## 2017-08-22 NOTE — PROGRESS NOTES
HISTORY OF PRESENT ILLNESS:  A 26-year-old white female with locally advanced   left breast carcinoma, who is status post four cycles of neoadjuvant   Adriamycin/Cytoxan and one cycle of neoadjuvant Taxotere.  The patient's course   has been complicated by prior difficulties with chemotherapy-associated anemia   requiring transfusion.  She returns to review interval CBC and had recently been   hospitalized with low-grade temp without associated neutropenia.  No new   complaint.  No new pertinent finding on a 10-point review of systems.    PHYSICAL EXAMINATION:  GENERAL:  Well-developed, well-nourished white female in no acute distress.  VITAL SIGNS:  Weight 158-1/2 pounds.  HEENT:  Normocephalic, atraumatic.  Oral mucosa pink and moist.  Lips without   lesions.  Tongue midline.  Oropharynx clear.  Nonicteric sclerae.  NECK:  Supple.  No adenopathy.  HEART:  Regular rate and rhythm without murmur, gallop or rub.  LUNGS:  Clear to auscultation bilaterally.  ABDOMEN:  Soft, nontender and nondistended with positive normoactive bowel   sounds.  No hepatosplenomegaly.  EXTREMITIES:  No cyanosis, clubbing or edema.  Distal pulses are intact.  BREASTS:  Stable residual left breast mass.    LABORATORY:  White count 9.8, H and H 11 and 32.6, platelets of 145.    IMPRESSION:  1.  Locally advanced left breast carcinoma.  2.  Chemotherapy-associated anemia and thrombocytopenia.    PLAN:  Keep scheduled appointment for reevaluation prior to cycle 2 of   neoadjuvant Taxotere.  No need of blood products at this time.      CHRIS/PN  dd: 08/22/2017 11:48:47 (CDT)  td: 08/22/2017 14:06:09 (CDT)  Doc ID   #[id number]  Job ID #[id number]    CC:

## 2017-08-28 ENCOUNTER — INFUSION (OUTPATIENT)
Dept: INFUSION THERAPY | Facility: HOSPITAL | Age: 27
End: 2017-08-28
Attending: INTERNAL MEDICINE
Payer: COMMERCIAL

## 2017-08-28 ENCOUNTER — LAB VISIT (OUTPATIENT)
Dept: LAB | Facility: HOSPITAL | Age: 27
End: 2017-08-28
Attending: INTERNAL MEDICINE
Payer: COMMERCIAL

## 2017-08-28 ENCOUNTER — OFFICE VISIT (OUTPATIENT)
Dept: HEMATOLOGY/ONCOLOGY | Facility: CLINIC | Age: 27
End: 2017-08-28
Payer: COMMERCIAL

## 2017-08-28 VITALS
RESPIRATION RATE: 16 BRPM | WEIGHT: 164 LBS | HEART RATE: 81 BPM | SYSTOLIC BLOOD PRESSURE: 100 MMHG | BODY MASS INDEX: 29.06 KG/M2 | DIASTOLIC BLOOD PRESSURE: 62 MMHG | HEIGHT: 63 IN | TEMPERATURE: 98 F

## 2017-08-28 VITALS
RESPIRATION RATE: 16 BRPM | DIASTOLIC BLOOD PRESSURE: 72 MMHG | BODY MASS INDEX: 29.06 KG/M2 | SYSTOLIC BLOOD PRESSURE: 106 MMHG | TEMPERATURE: 98 F | HEART RATE: 95 BPM | HEIGHT: 63 IN | WEIGHT: 164 LBS

## 2017-08-28 DIAGNOSIS — Z17.0 MALIGNANT NEOPLASM OF UPPER-INNER QUADRANT OF LEFT BREAST IN FEMALE, ESTROGEN RECEPTOR POSITIVE: ICD-10-CM

## 2017-08-28 DIAGNOSIS — C77.9 REGIONAL LYMPH NODE METASTASIS PRESENT: ICD-10-CM

## 2017-08-28 DIAGNOSIS — T45.1X5A CHEMOTHERAPY INDUCED NEUTROPENIA: ICD-10-CM

## 2017-08-28 DIAGNOSIS — C50.212 MALIGNANT NEOPLASM OF UPPER-INNER QUADRANT OF LEFT BREAST IN FEMALE, ESTROGEN RECEPTOR POSITIVE: ICD-10-CM

## 2017-08-28 DIAGNOSIS — C50.212 MALIGNANT NEOPLASM OF UPPER-INNER QUADRANT OF LEFT FEMALE BREAST, UNSPECIFIED ESTROGEN RECEPTOR STATUS: Primary | ICD-10-CM

## 2017-08-28 DIAGNOSIS — D70.1 CHEMOTHERAPY INDUCED NEUTROPENIA: ICD-10-CM

## 2017-08-28 DIAGNOSIS — D64.81 ANTINEOPLASTIC CHEMOTHERAPY INDUCED ANEMIA: ICD-10-CM

## 2017-08-28 DIAGNOSIS — T45.1X5A ANTINEOPLASTIC CHEMOTHERAPY INDUCED ANEMIA: ICD-10-CM

## 2017-08-28 DIAGNOSIS — R91.1 PULMONARY NODULE: ICD-10-CM

## 2017-08-28 LAB
ALBUMIN SERPL BCP-MCNC: 4.1 G/DL
ALP SERPL-CCNC: 68 U/L
ALT SERPL W/O P-5'-P-CCNC: 47 U/L
ANION GAP SERPL CALC-SCNC: 8 MMOL/L
AST SERPL-CCNC: 40 U/L
BASOPHILS # BLD AUTO: 0.04 K/UL
BASOPHILS NFR BLD: 0.8 %
BILIRUB SERPL-MCNC: 0.6 MG/DL
BUN SERPL-MCNC: 12 MG/DL
CALCIUM SERPL-MCNC: 9.3 MG/DL
CHLORIDE SERPL-SCNC: 104 MMOL/L
CO2 SERPL-SCNC: 28 MMOL/L
CREAT SERPL-MCNC: 0.66 MG/DL
DIFFERENTIAL METHOD: ABNORMAL
EOSINOPHIL # BLD AUTO: 0 K/UL
EOSINOPHIL NFR BLD: 0.2 %
ERYTHROCYTE [DISTWIDTH] IN BLOOD BY AUTOMATED COUNT: 14.1 %
EST. GFR  (AFRICAN AMERICAN): >60 ML/MIN/1.73 M^2
EST. GFR  (NON AFRICAN AMERICAN): >60 ML/MIN/1.73 M^2
GLUCOSE SERPL-MCNC: 86 MG/DL
HCT VFR BLD AUTO: 29.7 %
HGB BLD-MCNC: 10.2 G/DL
LDH SERPL L TO P-CCNC: 520 U/L
LYMPHOCYTES # BLD AUTO: 1.2 K/UL
LYMPHOCYTES NFR BLD: 23.6 %
MAGNESIUM SERPL-MCNC: 1.8 MG/DL
MCH RBC QN AUTO: 31.5 PG
MCHC RBC AUTO-ENTMCNC: 34.3 G/DL
MCV RBC AUTO: 92 FL
MONOCYTES # BLD AUTO: 0.6 K/UL
MONOCYTES NFR BLD: 11.7 %
NEUTROPHILS # BLD AUTO: 3.3 K/UL
NEUTROPHILS NFR BLD: 63.7 %
NRBC BLD-RTO: 0 /100 WBC
PLATELET # BLD AUTO: 174 K/UL
PMV BLD AUTO: 9.5 FL
POTASSIUM SERPL-SCNC: 4.1 MMOL/L
PROT SERPL-MCNC: 6.9 G/DL
RBC # BLD AUTO: 3.24 M/UL
SODIUM SERPL-SCNC: 140 MMOL/L
WBC # BLD AUTO: 5.12 K/UL

## 2017-08-28 PROCEDURE — 83735 ASSAY OF MAGNESIUM: CPT

## 2017-08-28 PROCEDURE — 36415 COLL VENOUS BLD VENIPUNCTURE: CPT | Mod: PN

## 2017-08-28 PROCEDURE — 85025 COMPLETE CBC W/AUTO DIFF WBC: CPT | Mod: PN

## 2017-08-28 PROCEDURE — 83615 LACTATE (LD) (LDH) ENZYME: CPT

## 2017-08-28 PROCEDURE — 80053 COMPREHEN METABOLIC PANEL: CPT | Mod: PN

## 2017-08-28 PROCEDURE — 25000003 PHARM REV CODE 250: Mod: PN | Performed by: INTERNAL MEDICINE

## 2017-08-28 PROCEDURE — 99999 PR PBB SHADOW E&M-EST. PATIENT-LVL III: CPT | Mod: PBBFAC,,, | Performed by: INTERNAL MEDICINE

## 2017-08-28 PROCEDURE — 83615 LACTATE (LD) (LDH) ENZYME: CPT | Mod: PN

## 2017-08-28 PROCEDURE — 99214 OFFICE O/P EST MOD 30 MIN: CPT | Mod: S$GLB,,, | Performed by: INTERNAL MEDICINE

## 2017-08-28 PROCEDURE — 80053 COMPREHEN METABOLIC PANEL: CPT

## 2017-08-28 PROCEDURE — 83735 ASSAY OF MAGNESIUM: CPT | Mod: PN

## 2017-08-28 PROCEDURE — 63600175 PHARM REV CODE 636 W HCPCS: Mod: PN | Performed by: INTERNAL MEDICINE

## 2017-08-28 PROCEDURE — 96367 TX/PROPH/DG ADDL SEQ IV INF: CPT | Mod: PN

## 2017-08-28 PROCEDURE — 96377 APPLICATON ON-BODY INJECTOR: CPT | Mod: PN

## 2017-08-28 PROCEDURE — A4216 STERILE WATER/SALINE, 10 ML: HCPCS | Mod: PN | Performed by: INTERNAL MEDICINE

## 2017-08-28 PROCEDURE — 85025 COMPLETE CBC W/AUTO DIFF WBC: CPT

## 2017-08-28 PROCEDURE — 96413 CHEMO IV INFUSION 1 HR: CPT | Mod: PN

## 2017-08-28 PROCEDURE — 3008F BODY MASS INDEX DOCD: CPT | Mod: S$GLB,,, | Performed by: INTERNAL MEDICINE

## 2017-08-28 RX ORDER — HEPARIN 100 UNIT/ML
500 SYRINGE INTRAVENOUS
Status: DISCONTINUED | OUTPATIENT
Start: 2017-08-28 | End: 2017-08-28 | Stop reason: HOSPADM

## 2017-08-28 RX ORDER — SODIUM CHLORIDE 0.9 % (FLUSH) 0.9 %
10 SYRINGE (ML) INJECTION
Status: CANCELLED | OUTPATIENT
Start: 2017-08-28

## 2017-08-28 RX ORDER — HEPARIN 100 UNIT/ML
500 SYRINGE INTRAVENOUS
Status: CANCELLED | OUTPATIENT
Start: 2017-08-28

## 2017-08-28 RX ORDER — SODIUM CHLORIDE 0.9 % (FLUSH) 0.9 %
10 SYRINGE (ML) INJECTION
Status: DISCONTINUED | OUTPATIENT
Start: 2017-08-28 | End: 2017-08-28 | Stop reason: HOSPADM

## 2017-08-28 RX ADMIN — PALONOSETRON HYDROCHLORIDE: 0.25 INJECTION INTRAVENOUS at 11:08

## 2017-08-28 RX ADMIN — SODIUM CHLORIDE 150 MG: 9 INJECTION, SOLUTION INTRAVENOUS at 11:08

## 2017-08-28 RX ADMIN — PEGFILGRASTIM 6 MG: KIT SUBCUTANEOUS at 01:08

## 2017-08-28 RX ADMIN — DOCETAXEL 175 MG: 20 INJECTION, SOLUTION, CONCENTRATE INTRAVENOUS at 12:08

## 2017-08-28 RX ADMIN — HEPARIN 500 UNITS: 100 SYRINGE at 01:08

## 2017-08-28 RX ADMIN — SODIUM CHLORIDE, PRESERVATIVE FREE 10 ML: 5 INJECTION INTRAVENOUS at 01:08

## 2017-08-28 RX ADMIN — DIPHENHYDRAMINE HYDROCHLORIDE 25 MG: 50 INJECTION, SOLUTION INTRAMUSCULAR; INTRAVENOUS at 11:08

## 2017-08-28 RX ADMIN — SODIUM CHLORIDE: 0.9 INJECTION, SOLUTION INTRAVENOUS at 11:08

## 2017-08-28 RX ADMIN — SODIUM CHLORIDE, PRESERVATIVE FREE 10 ML: 5 INJECTION INTRAVENOUS at 11:08

## 2017-08-28 NOTE — PLAN OF CARE
Problem: Patient Care Overview (Adult)  Goal: Plan of Care Review  Outcome: Ongoing (interventions implemented as appropriate)  Tolerated Taxotere infusion without any difficulty; Neulasta OBI applied to R arm and jonas well.  RTC in 3 weeks for next tx ; Amb off unit independently with spouse

## 2017-08-28 NOTE — PROGRESS NOTES
HISTORY OF PRESENT ILLNESS:  The patient is a 26-year-old white female well   known to me for locally advanced left breast carcinoma, who is receiving   neoadjuvant chemotherapy and has completed four cycles of Adriamycin/Cytoxan and   one cycle of Taxotere.  She returns to clinic for second cycle of Taxotere.  No   worsening signs or symptoms of anemia.  The patient continues to experience hot   flashes despite use of 50 mg of desvenlafaxine.  No other complaints or   pertinent findings on a 14-point review of systems.    PHYSICAL EXAMINATION:  GENERAL:  Well-developed, well-nourished white female in no acute distress.  VITAL SIGNS:  Weight of 164 pounds (increased by 5-1/2 pounds).  HEENT:  Normocephalic, atraumatic.  Oral mucosa pink and moist.  Lips without   lesions.  Tongue midline.  Oropharynx clear.  Nonicteric sclerae.  Total   alopecia.  NECK:  Supple, no adenopathy.  No carotid bruits, thyromegaly or thyroid nodule.  HEART:  Regular rate and rhythm without murmur, gallop or rub.  LUNGS:  Clear to auscultation bilaterally.  Normal respiratory effort.  ABDOMEN:  Soft, nontender, nondistended with positive normoactive bowel sounds,   no hepatosplenomegaly.  EXTREMITIES:  No cyanosis, clubbing or edema.  Distal pulses are intact.  AXILLAE AND GROIN:  No palpable pathologic lymphadenopathy is appreciated.  SKIN:  Intact/turgor normal.  NEUROLOGIC:  Cranial nerves II-XII grossly intact.  Motor:  Good muscle bulk and   tone.  Strength/sensory 5/5 throughout.  Gait stable.     LABORATORY:  White count 5.1, H and H 10.2 and 29.7, platelet count of 174.    Sodium 140, potassium 4.1, chloride 104, CO2 of 28, BUN 12, creatinine 0.7,   glucose 86, calcium 9.3, mag 1.8.  AST 40, ALT 47, total bilirubin 0.6, .    GFR greater than 60.    IMPRESSION:  1.  Locally advanced left breast carcinoma.  2.  Treatment-associated anemia - asymptomatic.  3.  Hot flashes secondary to therapy.    PLAN:  1.  Increase  desvenlafaxine to 100 mg daily.  2.  Proceed with cycle 2 of adjuvant chemotherapy to consist of 175 mg of   Taxotere IV.  3.  The patient will receive DP 20/0.25, Emend, p.r.n. Compazine for control of   acute and delayed emesis.  4.  Typical Benadryl premedication prior to Taxotere.  5.  Prophylactic Neulasta 6 mg subq for prevention of ricki-associated sepsis   and/or treatment delay.  6.  Return to clinic in three weeks with interval CBC, CMP, LDH, and mag.      CHRIS/PN  dd: 08/28/2017 09:39:33 (CDT)  td: 08/28/2017 10:46:59 (CDT)  Doc ID   #[id number]  Job ID #[id number]    CC:

## 2017-08-29 ENCOUNTER — PATIENT MESSAGE (OUTPATIENT)
Dept: HEMATOLOGY/ONCOLOGY | Facility: CLINIC | Age: 27
End: 2017-08-29

## 2017-08-30 ENCOUNTER — PATIENT MESSAGE (OUTPATIENT)
Dept: HEMATOLOGY/ONCOLOGY | Facility: CLINIC | Age: 27
End: 2017-08-30

## 2017-09-18 ENCOUNTER — LAB VISIT (OUTPATIENT)
Dept: LAB | Facility: HOSPITAL | Age: 27
End: 2017-09-18
Attending: INTERNAL MEDICINE
Payer: COMMERCIAL

## 2017-09-18 ENCOUNTER — DOCUMENTATION ONLY (OUTPATIENT)
Dept: INFUSION THERAPY | Facility: HOSPITAL | Age: 27
End: 2017-09-18

## 2017-09-18 ENCOUNTER — INFUSION (OUTPATIENT)
Dept: INFUSION THERAPY | Facility: HOSPITAL | Age: 27
End: 2017-09-18
Attending: INTERNAL MEDICINE
Payer: COMMERCIAL

## 2017-09-18 ENCOUNTER — OFFICE VISIT (OUTPATIENT)
Dept: HEMATOLOGY/ONCOLOGY | Facility: CLINIC | Age: 27
End: 2017-09-18
Payer: COMMERCIAL

## 2017-09-18 VITALS
HEIGHT: 63 IN | HEART RATE: 99 BPM | BODY MASS INDEX: 28.36 KG/M2 | SYSTOLIC BLOOD PRESSURE: 121 MMHG | TEMPERATURE: 99 F | DIASTOLIC BLOOD PRESSURE: 78 MMHG | WEIGHT: 160.06 LBS | RESPIRATION RATE: 16 BRPM

## 2017-09-18 VITALS
HEART RATE: 89 BPM | BODY MASS INDEX: 28.36 KG/M2 | DIASTOLIC BLOOD PRESSURE: 67 MMHG | SYSTOLIC BLOOD PRESSURE: 97 MMHG | RESPIRATION RATE: 16 BRPM | TEMPERATURE: 99 F | WEIGHT: 160.06 LBS | HEIGHT: 63 IN

## 2017-09-18 DIAGNOSIS — D70.1 CHEMOTHERAPY-INDUCED NEUTROPENIA: ICD-10-CM

## 2017-09-18 DIAGNOSIS — C50.212 MALIGNANT NEOPLASM OF UPPER-INNER QUADRANT OF LEFT FEMALE BREAST, UNSPECIFIED ESTROGEN RECEPTOR STATUS: Primary | ICD-10-CM

## 2017-09-18 DIAGNOSIS — C77.9 REGIONAL LYMPH NODE METASTASIS PRESENT: ICD-10-CM

## 2017-09-18 DIAGNOSIS — T45.1X5A CHEMOTHERAPY-INDUCED NEUTROPENIA: ICD-10-CM

## 2017-09-18 LAB
ALBUMIN SERPL BCP-MCNC: 4.2 G/DL
ALP SERPL-CCNC: 67 U/L
ALT SERPL W/O P-5'-P-CCNC: 40 U/L
ANION GAP SERPL CALC-SCNC: 7 MMOL/L
AST SERPL-CCNC: 33 U/L
BASOPHILS # BLD AUTO: 0.03 K/UL
BASOPHILS NFR BLD: 0.7 %
BILIRUB SERPL-MCNC: 0.6 MG/DL
BUN SERPL-MCNC: 11 MG/DL
CALCIUM SERPL-MCNC: 9.3 MG/DL
CHLORIDE SERPL-SCNC: 104 MMOL/L
CO2 SERPL-SCNC: 29 MMOL/L
CREAT SERPL-MCNC: 0.72 MG/DL
DIFFERENTIAL METHOD: ABNORMAL
EOSINOPHIL # BLD AUTO: 0 K/UL
EOSINOPHIL NFR BLD: 0.2 %
ERYTHROCYTE [DISTWIDTH] IN BLOOD BY AUTOMATED COUNT: 14.8 %
EST. GFR  (AFRICAN AMERICAN): >60 ML/MIN/1.73 M^2
EST. GFR  (NON AFRICAN AMERICAN): >60 ML/MIN/1.73 M^2
GLUCOSE SERPL-MCNC: 92 MG/DL
HCT VFR BLD AUTO: 31.9 %
HGB BLD-MCNC: 10.6 G/DL
LDH SERPL L TO P-CCNC: 538 U/L
LYMPHOCYTES # BLD AUTO: 0.9 K/UL
LYMPHOCYTES NFR BLD: 22.1 %
MAGNESIUM SERPL-MCNC: 2 MG/DL
MCH RBC QN AUTO: 31.9 PG
MCHC RBC AUTO-ENTMCNC: 33.2 G/DL
MCV RBC AUTO: 96 FL
MONOCYTES # BLD AUTO: 0.6 K/UL
MONOCYTES NFR BLD: 13.2 %
NEUTROPHILS # BLD AUTO: 2.7 K/UL
NEUTROPHILS NFR BLD: 63.8 %
NRBC BLD-RTO: 0 /100 WBC
PLATELET # BLD AUTO: 169 K/UL
PMV BLD AUTO: 8.9 FL
POTASSIUM SERPL-SCNC: 4.4 MMOL/L
PROT SERPL-MCNC: 7.1 G/DL
RBC # BLD AUTO: 3.32 M/UL
SODIUM SERPL-SCNC: 140 MMOL/L
WBC # BLD AUTO: 4.25 K/UL

## 2017-09-18 PROCEDURE — 96413 CHEMO IV INFUSION 1 HR: CPT | Mod: PN

## 2017-09-18 PROCEDURE — 99214 OFFICE O/P EST MOD 30 MIN: CPT | Mod: S$GLB,,, | Performed by: INTERNAL MEDICINE

## 2017-09-18 PROCEDURE — 85025 COMPLETE CBC W/AUTO DIFF WBC: CPT | Mod: PN

## 2017-09-18 PROCEDURE — 80053 COMPREHEN METABOLIC PANEL: CPT | Mod: PN

## 2017-09-18 PROCEDURE — 96377 APPLICATON ON-BODY INJECTOR: CPT | Mod: PN

## 2017-09-18 PROCEDURE — 99999 PR PBB SHADOW E&M-EST. PATIENT-LVL III: CPT | Mod: PBBFAC,,, | Performed by: INTERNAL MEDICINE

## 2017-09-18 PROCEDURE — 83735 ASSAY OF MAGNESIUM: CPT

## 2017-09-18 PROCEDURE — 25000003 PHARM REV CODE 250: Mod: PN | Performed by: INTERNAL MEDICINE

## 2017-09-18 PROCEDURE — 36415 COLL VENOUS BLD VENIPUNCTURE: CPT | Mod: PN

## 2017-09-18 PROCEDURE — 80053 COMPREHEN METABOLIC PANEL: CPT

## 2017-09-18 PROCEDURE — A4216 STERILE WATER/SALINE, 10 ML: HCPCS | Mod: PN | Performed by: INTERNAL MEDICINE

## 2017-09-18 PROCEDURE — 83615 LACTATE (LD) (LDH) ENZYME: CPT

## 2017-09-18 PROCEDURE — 83735 ASSAY OF MAGNESIUM: CPT | Mod: PN

## 2017-09-18 PROCEDURE — 96367 TX/PROPH/DG ADDL SEQ IV INF: CPT | Mod: PN

## 2017-09-18 PROCEDURE — 63600175 PHARM REV CODE 636 W HCPCS: Mod: PN | Performed by: INTERNAL MEDICINE

## 2017-09-18 PROCEDURE — 85025 COMPLETE CBC W/AUTO DIFF WBC: CPT

## 2017-09-18 PROCEDURE — 83615 LACTATE (LD) (LDH) ENZYME: CPT | Mod: PN

## 2017-09-18 PROCEDURE — 3008F BODY MASS INDEX DOCD: CPT | Mod: S$GLB,,, | Performed by: INTERNAL MEDICINE

## 2017-09-18 RX ORDER — HEPARIN 100 UNIT/ML
500 SYRINGE INTRAVENOUS
Status: COMPLETED | OUTPATIENT
Start: 2017-09-18 | End: 2017-09-18

## 2017-09-18 RX ORDER — HEPARIN 100 UNIT/ML
500 SYRINGE INTRAVENOUS
Status: CANCELLED | OUTPATIENT
Start: 2017-09-18

## 2017-09-18 RX ORDER — SODIUM CHLORIDE 0.9 % (FLUSH) 0.9 %
10 SYRINGE (ML) INJECTION
Status: CANCELLED | OUTPATIENT
Start: 2017-09-18

## 2017-09-18 RX ORDER — SODIUM CHLORIDE 0.9 % (FLUSH) 0.9 %
10 SYRINGE (ML) INJECTION
Status: DISCONTINUED | OUTPATIENT
Start: 2017-09-18 | End: 2017-09-18 | Stop reason: HOSPADM

## 2017-09-18 RX ADMIN — PEGFILGRASTIM 6 MG: KIT SUBCUTANEOUS at 03:09

## 2017-09-18 RX ADMIN — SODIUM CHLORIDE: 0.9 INJECTION, SOLUTION INTRAVENOUS at 12:09

## 2017-09-18 RX ADMIN — Medication 10 ML: at 03:09

## 2017-09-18 RX ADMIN — HEPARIN SODIUM (PORCINE) LOCK FLUSH IV SOLN 100 UNIT/ML 500 UNITS: 100 SOLUTION at 03:09

## 2017-09-18 RX ADMIN — Medication 10 ML: at 12:09

## 2017-09-18 RX ADMIN — SODIUM CHLORIDE 150 MG: 9 INJECTION, SOLUTION INTRAVENOUS at 01:09

## 2017-09-18 RX ADMIN — DIPHENHYDRAMINE HYDROCHLORIDE 25 MG: 50 INJECTION, SOLUTION INTRAMUSCULAR; INTRAVENOUS at 12:09

## 2017-09-18 RX ADMIN — DOCETAXEL 175 MG: 20 INJECTION, SOLUTION, CONCENTRATE INTRAVENOUS at 01:09

## 2017-09-18 RX ADMIN — PALONOSETRON HYDROCHLORIDE: 0.25 INJECTION INTRAVENOUS at 12:09

## 2017-09-18 NOTE — PLAN OF CARE
Problem: Patient Care Overview  Goal: Plan of Care Review  Outcome: Ongoing (interventions implemented as appropriate)  Adequate for discharge.   Pt tolerated infusion without noted distress.  Reviewed upcoming appointments.  All questions answered.  Ambulated from infusion center independently with father.

## 2017-09-18 NOTE — PROGRESS NOTES
Date of Service: 09/18/2017  The patient is a 26-year-old white female well known to me for locally advanced   left breast carcinoma who is receiving neoadjuvant chemotherapy and has   completed four cycles of Adriamycin/Cytoxan and 2 of a planned 4 cycles of   adjuvant Taxotere.  The patient returns to clinic to embark upon next cycle of   therapy.  No nausea, vomiting, fevers, chills, mouth sores, diarrhea, etc.  Hot   flashes are somewhat better on desvenlafaxine.  No other pertinent findings on a   14-point review of systems.    PHYSICAL EXAMINATION:  GENERAL:  Well-developed, well-nourished white female in no acute distress.  VITAL SIGNS:  Weight of 160 pounds.  HEENT:  Normocephalic, atraumatic.  Oral mucosa pink and moist.  Lips without   lesions.  Tongue midline.  Oropharynx clear.  Nonicteric sclerae.   NECK:  Supple, no adenopathy.  No carotid bruits, thyromegaly or thyroid nodule.                                                                        HEART:  Regular rate and rhythm without murmur, gallop or rub.                LUNGS:  Clear to auscultation bilaterally.  Normal respiratory effort.       ABDOMEN:  Soft, nontender, nondistended with positive normoactive bowel sounds,   no hepatosplenomegaly.    EXTREMITIES:  No cyanosis, clubbing or edema.  Distal pulses are intact.                                              AXILLAE AND GROIN:  Continued decrease in the prominence of the left axillary   lymph node since last cycle of Taxotere.  SKIN:  Intact/turgor normal                                                              NEUROLOGIC:  Cranial nerves II-XII grossly intact.  Motor:  Good muscle bulk and   tone.  Strength/sensory 5/5 throughout.  Gait stable.   BREASTS:  The patient's residual palpable left breast mass is relatively stable   compared to prior examination.    LABORATORY:  White count 4.3, H and H 10.6 and 31.9, platelets 169.  Sodium 140,   potassium 4.4, chloride 104, CO2 29, BUN  11, creatinine 0.7, glucose 92,   calcium 9.3, mag 2.  Liver function tests are within normal limits.  LDH is 538.    IMPRESSION:  1.  Locally advanced left breast carcinoma responding appropriately to therapy.  2.  Treatment-associated anemia -- asymptomatic.  3.  Hot flashes secondary to therapy.    PLAN:  1.  Proceed with third cycle of adjuvant Taxotere to consist of 175 mg IV.  2.  The patient will receive DP 20/0.25, Emend, p.r.n. Compazine for control of   acute and delayed emesis.  3.  Typical Benadryl premedications prior to Taxotere.  4.  Prophylactic Neulasta 6 mg subQ for prevention of ricki-associated sepsis   and/or treatment delay.  5.  Return in three weeks with interval CBC, CMP, LDH, and magnesium, at which   time, we will proceed with fourth and final planned cycle of neoadjuvant   Taxotere.      CHRIS/HN  dd: 09/18/2017 11:07:22 (CDT)  td: 09/19/2017 00:21:33 (CDT)  Doc ID   #[id number]  Job ID #[id number]    CC:

## 2017-10-03 DIAGNOSIS — F41.9 ANXIETY: Primary | ICD-10-CM

## 2017-10-04 RX ORDER — ALPRAZOLAM 0.25 MG/1
TABLET ORAL
Qty: 60 TABLET | Refills: 0 | Status: SHIPPED | OUTPATIENT
Start: 2017-10-04 | End: 2017-12-28 | Stop reason: SDUPTHER

## 2017-10-09 ENCOUNTER — OFFICE VISIT (OUTPATIENT)
Dept: HEMATOLOGY/ONCOLOGY | Facility: CLINIC | Age: 27
End: 2017-10-09
Payer: COMMERCIAL

## 2017-10-09 ENCOUNTER — INFUSION (OUTPATIENT)
Dept: INFUSION THERAPY | Facility: HOSPITAL | Age: 27
End: 2017-10-09
Attending: INTERNAL MEDICINE
Payer: COMMERCIAL

## 2017-10-09 VITALS
DIASTOLIC BLOOD PRESSURE: 70 MMHG | HEIGHT: 63 IN | BODY MASS INDEX: 29.12 KG/M2 | SYSTOLIC BLOOD PRESSURE: 113 MMHG | TEMPERATURE: 98 F | HEART RATE: 86 BPM | RESPIRATION RATE: 16 BRPM | WEIGHT: 164.38 LBS

## 2017-10-09 VITALS
OXYGEN SATURATION: 99 % | RESPIRATION RATE: 20 BRPM | BODY MASS INDEX: 29.12 KG/M2 | WEIGHT: 164.38 LBS | TEMPERATURE: 98 F | SYSTOLIC BLOOD PRESSURE: 105 MMHG | DIASTOLIC BLOOD PRESSURE: 69 MMHG | HEART RATE: 89 BPM | HEIGHT: 63 IN

## 2017-10-09 DIAGNOSIS — C50.212 MALIGNANT NEOPLASM OF UPPER-INNER QUADRANT OF LEFT FEMALE BREAST, UNSPECIFIED ESTROGEN RECEPTOR STATUS: Primary | ICD-10-CM

## 2017-10-09 DIAGNOSIS — C77.9 REGIONAL LYMPH NODE METASTASIS PRESENT: ICD-10-CM

## 2017-10-09 PROCEDURE — A4216 STERILE WATER/SALINE, 10 ML: HCPCS | Mod: PN | Performed by: NURSE PRACTITIONER

## 2017-10-09 PROCEDURE — 99999 PR PBB SHADOW E&M-EST. PATIENT-LVL III: CPT | Mod: PBBFAC,,, | Performed by: NURSE PRACTITIONER

## 2017-10-09 PROCEDURE — 96377 APPLICATON ON-BODY INJECTOR: CPT | Mod: PN

## 2017-10-09 PROCEDURE — 96367 TX/PROPH/DG ADDL SEQ IV INF: CPT | Mod: PN

## 2017-10-09 PROCEDURE — 99214 OFFICE O/P EST MOD 30 MIN: CPT | Mod: S$GLB,,, | Performed by: NURSE PRACTITIONER

## 2017-10-09 PROCEDURE — 25000003 PHARM REV CODE 250: Mod: PN | Performed by: NURSE PRACTITIONER

## 2017-10-09 PROCEDURE — 63600175 PHARM REV CODE 636 W HCPCS: Mod: PN | Performed by: NURSE PRACTITIONER

## 2017-10-09 PROCEDURE — 96413 CHEMO IV INFUSION 1 HR: CPT | Mod: PN

## 2017-10-09 RX ORDER — SODIUM CHLORIDE 0.9 % (FLUSH) 0.9 %
10 SYRINGE (ML) INJECTION
Status: DISCONTINUED | OUTPATIENT
Start: 2017-10-09 | End: 2017-10-09 | Stop reason: HOSPADM

## 2017-10-09 RX ORDER — HEPARIN 100 UNIT/ML
500 SYRINGE INTRAVENOUS
Status: DISCONTINUED | OUTPATIENT
Start: 2017-10-09 | End: 2017-10-09 | Stop reason: HOSPADM

## 2017-10-09 RX ORDER — HEPARIN 100 UNIT/ML
500 SYRINGE INTRAVENOUS
Status: CANCELLED | OUTPATIENT
Start: 2017-10-09

## 2017-10-09 RX ORDER — SODIUM CHLORIDE 0.9 % (FLUSH) 0.9 %
10 SYRINGE (ML) INJECTION
Status: CANCELLED | OUTPATIENT
Start: 2017-10-09

## 2017-10-09 RX ADMIN — DIPHENHYDRAMINE HYDROCHLORIDE 25 MG: 50 INJECTION, SOLUTION INTRAMUSCULAR; INTRAVENOUS at 11:10

## 2017-10-09 RX ADMIN — PALONOSETRON HYDROCHLORIDE: 0.25 INJECTION INTRAVENOUS at 10:10

## 2017-10-09 RX ADMIN — SODIUM CHLORIDE: 900 INJECTION, SOLUTION INTRAVENOUS at 10:10

## 2017-10-09 RX ADMIN — DOCETAXEL 180 MG: 20 INJECTION, SOLUTION, CONCENTRATE INTRAVENOUS at 11:10

## 2017-10-09 RX ADMIN — SODIUM CHLORIDE, PRESERVATIVE FREE 10 ML: 5 INJECTION INTRAVENOUS at 10:10

## 2017-10-09 RX ADMIN — PEGFILGRASTIM 6 MG: KIT SUBCUTANEOUS at 12:10

## 2017-10-09 RX ADMIN — SODIUM CHLORIDE 150 MG: 9 INJECTION, SOLUTION INTRAVENOUS at 10:10

## 2017-10-09 RX ADMIN — HEPARIN 500 UNITS: 100 SYRINGE at 12:10

## 2017-10-09 NOTE — PROGRESS NOTES
HISTORY OF PRESENT ILLNESS:  The patient is a 26-year-old white female well known   to Dr. Gaming for locally advanced left breast carcinoma who is receiving neoadjuvant chemotherapy.    She has completed four cycles of Adriamycin/Cytoxan and 3 of a planned 4 cycles of   adjuvant Taxotere.  The patient returns to clinic with her  for evaluation prior to    her last cycle of neoadjuvant chemotherapy.   She denies any nausea, vomiting, fevers, chills,   mouth sores, diarrhea, etc.  No other pertinent findings on a 14-point review of systems.    PHYSICAL EXAMINATION:  GENERAL:  Well-developed, well-nourished white female in no acute distress.  Alert & oriented x 3.  VITAL SIGNS:  Weight of 164.4 pounds. (gain of 4 pounds).  /70, Pulse 86, Resp 16, Temp 97.8  HEENT:  Normocephalic, atraumatic.  Oral mucosa pink and moist.  Lips without   lesions.  Tongue midline.  Oropharynx clear.  Nonicteric sclerae. Total alopecia.  NECK:  Supple, no adenopathy.  No carotid bruits, thyromegaly or thyroid nodule.  HEART:  Regular rate and rhythm without murmur, gallop or rub.                LUNGS:  Clear to auscultation bilaterally.  Normal respiratory effort.       ABDOMEN:  Soft, nontender, nondistended with positive normoactive bowel sounds,   no hepatosplenomegaly.    EXTREMITIES:  No cyanosis, clubbing or edema.  Distal pulses are intact.        AXILLAE AND GROIN:  Continued decrease in the prominence of the left axillary   lymph node since last cycle of Taxotere.  SKIN:  Intact/turgor normal                                                       NEUROLOGIC:  Cranial nerves II-XII grossly intact.  Motor:  Good muscle bulk and   tone.  Strength/sensory 5/5 throughout.  Gait stable.     LABORATORY:    Lab Results   Component Value Date    WBC 3.79 (L) 10/09/2017    HGB 10.4 (L) 10/09/2017    HCT 31.5 (L) 10/09/2017    MCV 99 (H) 10/09/2017     10/09/2017     Unremarkable differential    CMP  Sodium   Date Value  Ref Range Status   10/09/2017 140 136 - 145 mmol/L Final     Potassium   Date Value Ref Range Status   10/09/2017 4.0 3.5 - 5.1 mmol/L Final     Chloride   Date Value Ref Range Status   10/09/2017 106 95 - 110 mmol/L Final     CO2   Date Value Ref Range Status   10/09/2017 27 22 - 31 mmol/L Final     Glucose   Date Value Ref Range Status   10/09/2017 82 70 - 110 mg/dL Final     Comment:     The ADA recommends the following guidelines for fasting glucose:  Normal:       less than 100 mg/dL  Prediabetes:  100 mg/dL to 125 mg/dL  Diabetes:     126 mg/dL or higher       BUN, Bld   Date Value Ref Range Status   10/09/2017 8 7 - 18 mg/dL Final     Creatinine   Date Value Ref Range Status   10/09/2017 0.64 0.50 - 1.40 mg/dL Final     Calcium   Date Value Ref Range Status   10/09/2017 8.8 8.4 - 10.2 mg/dL Final     Total Protein   Date Value Ref Range Status   10/09/2017 6.5 6.0 - 8.4 g/dL Final     Albumin   Date Value Ref Range Status   10/09/2017 3.8 3.5 - 5.2 g/dL Final     Total Bilirubin   Date Value Ref Range Status   10/09/2017 0.4 0.2 - 1.3 mg/dL Final     Alkaline Phosphatase   Date Value Ref Range Status   10/09/2017 65 38 - 145 U/L Final     AST   Date Value Ref Range Status   10/09/2017 36 14 - 36 U/L Final     ALT   Date Value Ref Range Status   10/09/2017 45 (H) 10 - 44 U/L Final     Anion Gap   Date Value Ref Range Status   10/09/2017 7 (L) 8 - 16 mmol/L Final     eGFR if    Date Value Ref Range Status   10/09/2017 >60 >60 mL/min/1.73 m^2 Final     eGFR if non    Date Value Ref Range Status   10/09/2017 >60 >60 mL/min/1.73 m^2 Final     Comment:     Calculation used to obtain the estimated glomerular filtration  rate (eGFR) is the CKD-EPI equation. Since race is unknown   in our information system, the eGFR values for   -American and Non--American patients are given   for each creatinine result.       , Magnesium 1.8    IMPRESSION:  1.  Locally advanced  left breast carcinoma responding appropriately to therapy.  2.  Treatment-associated anemia -- asymptomatic.  3.  Hot flashes secondary to therapy - manageable on Effexor    PLAN:  1.  Proceed with fourth and final cycle of adjuvant Taxotere to consist of 180 mg IV.  2.  The patient will receive DP 20/0.25, Emend, p.r.n. Compazine for control of   acute and delayed emesis.  3.  Typical Benadryl premedications prior to Taxotere.  4.  Prophylactic Neulasta 6 mg subQ for prevention of ricki-associated sepsis   and/or treatment delay.  5.  Return in three weeks with interval CBC, CMP, LDH, magnesium & CT PET prior   for restaging as patient has completed neoadjuvant chemotherapy.    Assessment/plan reviewed and approved by Dr. Aguirre.

## 2017-10-16 ENCOUNTER — PATIENT MESSAGE (OUTPATIENT)
Dept: HEMATOLOGY/ONCOLOGY | Facility: CLINIC | Age: 27
End: 2017-10-16

## 2017-10-26 ENCOUNTER — HOSPITAL ENCOUNTER (OUTPATIENT)
Dept: RADIOLOGY | Facility: HOSPITAL | Age: 27
Discharge: HOME | End: 2017-10-26
Attending: NURSE PRACTITIONER
Payer: COMMERCIAL

## 2017-10-26 DIAGNOSIS — C50.212 MALIGNANT NEOPLASM OF UPPER-INNER QUADRANT OF LEFT FEMALE BREAST, UNSPECIFIED ESTROGEN RECEPTOR STATUS: ICD-10-CM

## 2017-10-26 PROCEDURE — 78815 PET IMAGE W/CT SKULL-THIGH: CPT | Mod: 26,PS,, | Performed by: RADIOLOGY

## 2017-10-26 PROCEDURE — A9552 F18 FDG: HCPCS | Mod: PO

## 2017-10-30 ENCOUNTER — LAB VISIT (OUTPATIENT)
Dept: LAB | Facility: HOSPITAL | Age: 27
End: 2017-10-30
Attending: INTERNAL MEDICINE
Payer: COMMERCIAL

## 2017-10-30 ENCOUNTER — OFFICE VISIT (OUTPATIENT)
Dept: HEMATOLOGY/ONCOLOGY | Facility: CLINIC | Age: 27
End: 2017-10-30
Payer: COMMERCIAL

## 2017-10-30 ENCOUNTER — DOCUMENTATION ONLY (OUTPATIENT)
Dept: INFUSION THERAPY | Facility: HOSPITAL | Age: 27
End: 2017-10-30

## 2017-10-30 VITALS
BODY MASS INDEX: 30.79 KG/M2 | TEMPERATURE: 98 F | HEIGHT: 62 IN | WEIGHT: 167.31 LBS | SYSTOLIC BLOOD PRESSURE: 115 MMHG | RESPIRATION RATE: 16 BRPM | DIASTOLIC BLOOD PRESSURE: 67 MMHG | HEART RATE: 98 BPM

## 2017-10-30 DIAGNOSIS — C77.9 REGIONAL LYMPH NODE METASTASIS PRESENT: ICD-10-CM

## 2017-10-30 DIAGNOSIS — C50.212 MALIGNANT NEOPLASM OF UPPER-INNER QUADRANT OF LEFT FEMALE BREAST, UNSPECIFIED ESTROGEN RECEPTOR STATUS: Primary | ICD-10-CM

## 2017-10-30 DIAGNOSIS — C50.212 MALIGNANT NEOPLASM OF UPPER-INNER QUADRANT OF LEFT FEMALE BREAST, UNSPECIFIED ESTROGEN RECEPTOR STATUS: ICD-10-CM

## 2017-10-30 LAB
ALBUMIN SERPL BCP-MCNC: 3.7 G/DL
ALP SERPL-CCNC: 60 U/L
ALT SERPL W/O P-5'-P-CCNC: 46 U/L
ANION GAP SERPL CALC-SCNC: 7 MMOL/L
AST SERPL-CCNC: 37 U/L
BASOPHILS # BLD AUTO: 0.03 K/UL
BASOPHILS NFR BLD: 0.7 %
BILIRUB SERPL-MCNC: 0.5 MG/DL
BUN SERPL-MCNC: 10 MG/DL
CALCIUM SERPL-MCNC: 8.9 MG/DL
CHLORIDE SERPL-SCNC: 105 MMOL/L
CO2 SERPL-SCNC: 28 MMOL/L
CREAT SERPL-MCNC: 0.65 MG/DL
DIFFERENTIAL METHOD: ABNORMAL
EOSINOPHIL # BLD AUTO: 0 K/UL
EOSINOPHIL NFR BLD: 0 %
ERYTHROCYTE [DISTWIDTH] IN BLOOD BY AUTOMATED COUNT: 14.6 %
EST. GFR  (AFRICAN AMERICAN): >60 ML/MIN/1.73 M^2
EST. GFR  (NON AFRICAN AMERICAN): >60 ML/MIN/1.73 M^2
GLUCOSE SERPL-MCNC: 91 MG/DL
HCT VFR BLD AUTO: 32.2 %
HGB BLD-MCNC: 10.7 G/DL
LDH SERPL L TO P-CCNC: 539 U/L
LYMPHOCYTES # BLD AUTO: 1.1 K/UL
LYMPHOCYTES NFR BLD: 27 %
MAGNESIUM SERPL-MCNC: 2 MG/DL
MCH RBC QN AUTO: 33.2 PG
MCHC RBC AUTO-ENTMCNC: 33.2 G/DL
MCV RBC AUTO: 100 FL
MONOCYTES # BLD AUTO: 0.5 K/UL
MONOCYTES NFR BLD: 11.6 %
NEUTROPHILS # BLD AUTO: 2.5 K/UL
NEUTROPHILS NFR BLD: 60.7 %
NRBC BLD-RTO: 0 /100 WBC
PLATELET # BLD AUTO: 165 K/UL
PMV BLD AUTO: 8.5 FL
POTASSIUM SERPL-SCNC: 4.6 MMOL/L
PROT SERPL-MCNC: 6.4 G/DL
RBC # BLD AUTO: 3.22 M/UL
SODIUM SERPL-SCNC: 140 MMOL/L
WBC # BLD AUTO: 4.04 K/UL

## 2017-10-30 PROCEDURE — 99999 PR PBB SHADOW E&M-EST. PATIENT-LVL III: CPT | Mod: PBBFAC,,, | Performed by: INTERNAL MEDICINE

## 2017-10-30 PROCEDURE — 80053 COMPREHEN METABOLIC PANEL: CPT | Mod: PN

## 2017-10-30 PROCEDURE — 83735 ASSAY OF MAGNESIUM: CPT | Mod: PN

## 2017-10-30 PROCEDURE — 80053 COMPREHEN METABOLIC PANEL: CPT

## 2017-10-30 PROCEDURE — 85025 COMPLETE CBC W/AUTO DIFF WBC: CPT | Mod: PN

## 2017-10-30 PROCEDURE — 99214 OFFICE O/P EST MOD 30 MIN: CPT | Mod: S$GLB,,, | Performed by: INTERNAL MEDICINE

## 2017-10-30 PROCEDURE — 83735 ASSAY OF MAGNESIUM: CPT

## 2017-10-30 PROCEDURE — 36415 COLL VENOUS BLD VENIPUNCTURE: CPT | Mod: PN

## 2017-10-30 PROCEDURE — 83615 LACTATE (LD) (LDH) ENZYME: CPT | Mod: PN

## 2017-10-30 PROCEDURE — 83615 LACTATE (LD) (LDH) ENZYME: CPT

## 2017-10-30 PROCEDURE — 85025 COMPLETE CBC W/AUTO DIFF WBC: CPT

## 2017-10-30 NOTE — PROGRESS NOTES
Date of Service: 10/30/2017  HISTORY OF PRESENT ILLNESS:  The patient is a 26-year-old white female well   known to me for locally advanced left breast carcinoma, who has been responding   well to neoadjuvant therapy and has completed four cycles of adjuvant   Adriamycin/Cytoxan and four cycles of neoadjuvant Taxotere.  She returns to   review interval lab and imaging prior to definitive surgical therapy.  She has   noted some hair regrowth.  She has numerous questions regarding surgery, need of   radiation, hormonal therapy in the future, risk of weight gain, issues   surrounding fertility, etc.  No other complaints or pertinent findings on a   14-point review of systems.    PHYSICAL EXAMINATION:  GENERAL:  Well-developed, well-nourished white female in no acute distress, with   a weight of 167.5 pounds.  VITAL SIGNS:  Documented in EMR and reviewed.  HEENT:  Normocephalic, atraumatic.  Oral mucosa pink and moist.  Lips without   lesions.  Tongue midline.  Oropharynx clear.  Nonicteric sclerae.  Near total   alopecia.  NECK:  Supple, no adenopathy.  No carotid bruits, thyromegaly or thyroid nodule.  HEART:  Regular rate and rhythm without murmur, gallop or rub.  LUNGS:  Clear to auscultation bilaterally.  Normal respiratory effort.       ABDOMEN:  Soft, nontender, nondistended with positive normoactive bowel sounds,   no hepatosplenomegaly.    EXTREMITIES:  No cyanosis, clubbing or edema.  Distal pulses are intact.  AXILLAE AND GROIN:  No palpable pathologic lymphadenopathy is appreciated.  SKIN:  Intact/turgor normal.  NEUROLOGIC:  Cranial nerves II-XII grossly intact.  Motor:  Good muscle bulk and   tone.  Strength/sensory 5/5 throughout.  Gait stable.    LABORATORY:  White count 4, H and H 10.7 and 32.2, and platelet count of 165.    Sodium 140, potassium 4.6, chloride 105, CO2 28, BUN 10, creatinine 0.7, glucose   91, calcium 8.9, mag 2.0, AST 37, ALT 46, .  GFR greater than 60.    CT-PET improvement in  degree of hypermetabolism of the previously documented   left breast mass.  Left axillary lymph node has diminished to 1 cm in greatest   dimension and has SUV in near background, 4 mm right lower lobe pulmonary nodule   is unchanged from pretreatment scans.    IMPRESSION:  1.  Locally advanced left breast carcinoma with excellent response to   neoadjuvant chemotherapy.  2.  Treatment-associated anemia.    PLAN:  1.  Refer back to Dr. Jasiel Sapp for discussion of definitive surgical therapy.    At the present time, the patient is undecided as to whether or not she would   like to pursue breast conserving therapy versus unilateral mastectomy versus   bilateral mastectomy with reconstruction.  2.  The patient has some reluctance to receive radiation therapy, but is aware   that should she have multiple positive nodes at the time of surgery that this   may be a necessity for her optimal treatment regardless of which surgery is   selected.  3.  The patient has also been counseled that in light of strong   estrogen/progesterone receptor positivity of her tumor, that she will require   postoperative adjuvant endocrine therapy.  4.  Return to clinic postoperatively to review surgical path as well as interval   lab to include CBC, CMP, LDH and magnesium.      CHRIS/HN  dd: 10/30/2017 10:22:03 (CDT)  td: 10/31/2017 00:20:46 (CDT)  Doc ID   #[id number]  Job ID #[id number]    CC:

## 2017-10-30 NOTE — PROGRESS NOTES
: Followed up with pt. Provided Compassion that Compels bag. Pt reports distress over having to take hormone pills for a period of 5 years and the impact this has on her desire to become pregnant. Assisted pt with processing sadness surrounding this.Discussed pt's distress over loss of control over one's life due to cancer.  Provided support and encouragement. Discussed upcoming surgery. Discussed possibility of funding from [facility] to help with costs of surgery with [employer] funds. LCSW will follow. Akua Gautam LCSW

## 2017-10-31 ENCOUNTER — PATIENT MESSAGE (OUTPATIENT)
Dept: HEMATOLOGY/ONCOLOGY | Facility: CLINIC | Age: 27
End: 2017-10-31

## 2017-11-01 ENCOUNTER — PATIENT MESSAGE (OUTPATIENT)
Dept: HEMATOLOGY/ONCOLOGY | Facility: CLINIC | Age: 27
End: 2017-11-01

## 2017-11-02 ENCOUNTER — PATIENT MESSAGE (OUTPATIENT)
Dept: HEMATOLOGY/ONCOLOGY | Facility: CLINIC | Age: 27
End: 2017-11-02

## 2017-11-02 ENCOUNTER — TELEPHONE (OUTPATIENT)
Dept: HEMATOLOGY/ONCOLOGY | Facility: CLINIC | Age: 27
End: 2017-11-02

## 2017-11-02 ENCOUNTER — OFFICE VISIT (OUTPATIENT)
Dept: SURGERY | Facility: CLINIC | Age: 27
End: 2017-11-02
Payer: COMMERCIAL

## 2017-11-02 ENCOUNTER — TELEPHONE (OUTPATIENT)
Dept: SURGERY | Facility: CLINIC | Age: 27
End: 2017-11-02

## 2017-11-02 VITALS — BODY MASS INDEX: 29.64 KG/M2 | WEIGHT: 167.31 LBS | HEIGHT: 63 IN

## 2017-11-02 DIAGNOSIS — C77.9 REGIONAL LYMPH NODE METASTASIS PRESENT: ICD-10-CM

## 2017-11-02 DIAGNOSIS — Z17.0 MALIGNANT NEOPLASM OF UPPER-INNER QUADRANT OF LEFT BREAST IN FEMALE, ESTROGEN RECEPTOR POSITIVE: Primary | ICD-10-CM

## 2017-11-02 DIAGNOSIS — C50.212 MALIGNANT NEOPLASM OF UPPER-INNER QUADRANT OF LEFT BREAST IN FEMALE, ESTROGEN RECEPTOR POSITIVE: Primary | ICD-10-CM

## 2017-11-02 PROCEDURE — 99214 OFFICE O/P EST MOD 30 MIN: CPT | Mod: S$GLB,,, | Performed by: SURGERY

## 2017-11-02 PROCEDURE — 99999 PR PBB SHADOW E&M-EST. PATIENT-LVL II: CPT | Mod: PBBFAC,,, | Performed by: SURGERY

## 2017-11-02 NOTE — TELEPHONE ENCOUNTER
Called patient regarding a possible second opinion. Spoke to patient about her care. She is almost 4 weeks from her last dose of chemo, she states she is still very uncertain about what she wants to have done surgery-wise. She has not met with a plastic surgeon. Patient read about Dr. Re Harman and would like to see her. Scheduled for [facility] on 11/7. Gave patient address and suite number, as well as my number.     Also spoke to patient about plastic surgeons. Suggested she call one or two of the available plastic surgeons and tentatively schedule a consult for next week if possible after her Dr. Harman appointment. Sent her a list of all available surgeons that operate with Dr. Harman and their contact info. Encouraged her to call if she runs into any issues. Patient verbalized understanding.

## 2017-11-02 NOTE — TELEPHONE ENCOUNTER
Has appt to see Dr. Sapp on 11/2/17 to discuss surgical options. Patient is to return to clinic post op with path and labs to see Dr. Gaming.

## 2017-11-03 NOTE — PROGRESS NOTES
Subjective:       Patient ID: Berto Chun is a 26 y.o. female.    Chief Complaint: Consult (discuss surgery options)    Referred back by Dr. Gaming to discuss definitive surgery for her left breast cancer.    The patient is a 26-year-old white female well   known to me for locally advanced left breast carcinoma, who has been responding   well to neoadjuvant therapy and has completed four cycles of adjuvant   Adriamycin/Cytoxan and four cycles of neoadjuvant Taxotere.      She underwent PET CT  Impression   1.Interval improvement since 4/25/17 with the breast region of concern demonstrating mild hypermetabolic activity in comparison to background and the left axillary node appearing smaller and of background activity. In the absence of a surgical history the breast activity in the region of concern is somewhat concerning for residual disease even though the activity is only mildly above background  2. Increased metabolic activity is noted along the cutaneous surface of the inner thigh on the right possibly relating to a region of contusion or cellulitis. Please clinically correlate. Direct visualization could be performed if necessary  3. Right thyroid nodule, ultrasound may be helpful for better evaluation, this is not hypermetabolic  4. Increased osseous metabolic activity diffusely most likely relating to patient's history of chemotherapy without focal mass lesion identified.   5. Pulmonary nodule unchanged of 4 mm. Followup for at least one year a stability would be suggested    She is undecided as to what she wants to do next, but would like to avoid radiation therapy.          Review of Systems   Constitutional: Negative for appetite change, chills, diaphoresis, fatigue, fever and unexpected weight change.   HENT: Negative for hearing loss, sore throat, trouble swallowing and voice change.    Eyes: Negative for visual disturbance.   Respiratory: Negative for cough, shortness of breath and wheezing.     Cardiovascular: Positive for leg swelling (mild past few days). Negative for chest pain and palpitations.   Gastrointestinal: Negative for abdominal distention, abdominal pain, anal bleeding, blood in stool, constipation, diarrhea, nausea, rectal pain and vomiting.   Genitourinary: Negative for difficulty urinating, dysuria, flank pain, frequency, hematuria, menstrual problem and urgency.   Musculoskeletal: Negative for arthralgias, back pain, joint swelling, myalgias and neck pain.   Skin: Negative for pallor and rash.   Neurological: Negative for dizziness, syncope, weakness and headaches.   Hematological: Negative for adenopathy. Does not bruise/bleed easily.   Psychiatric/Behavioral: Negative for suicidal ideas. The patient is not nervous/anxious.        Objective:      Physical Exam   Constitutional: She is oriented to person, place, and time. She appears well-developed and well-nourished. No distress.   HENT:   Head: Normocephalic and atraumatic.   Right Ear: External ear normal.   Left Ear: External ear normal.   Eyes: Conjunctivae are normal. Pupils are equal, round, and reactive to light. Right eye exhibits no discharge. Left eye exhibits no discharge.   Neck: No tracheal deviation present. No thyromegaly present.   Cardiovascular: Normal rate and regular rhythm.    Pulmonary/Chest: Effort normal. No respiratory distress. Right breast exhibits no inverted nipple, no mass, no nipple discharge, no skin change and no tenderness. Left breast exhibits mass (ill defined UIQ). Left breast exhibits no inverted nipple, no nipple discharge, no skin change and no tenderness.   Abdominal: Soft. She exhibits no distension. There is no guarding.   Musculoskeletal: She exhibits no edema or tenderness.   Lymphadenopathy:     She has no cervical adenopathy.     She has no axillary adenopathy.   Neurological: She is alert and oriented to person, place, and time. No cranial nerve deficit.   Skin: Skin is warm and dry. No  rash noted. She is not diaphoretic. No pallor.   Psychiatric: She has a normal mood and affect. Her behavior is normal. Judgment and thought content normal.   Nursing note and vitals reviewed.      Assessment:       1. Malignant neoplasm of upper-inner quadrant of left breast in female, estrogen receptor positive    2. Regional lymph node metastasis present        Plan:       Discussion had with patient regarding treatment options - lumpectomy (with possible need for additional surgery) vs mastectomy, along with axillary dissection.  Advised that SLN is not always accurate following induction chemotherapy and that dissection would be more accurate.    Also that there is a likelihood that she will need axillary radiation if the nodes are positive, and that the fear of radiation should not push her to a mastectomy instead of lumpectomy.  We discussed reconstruction, and I advised that most likely the plastic surgeon would want to do a delayed reconstruction if radiation treatment is indicated.  She was strongly advised to get a second opinion at [facility] to see if there is any other options that they can offer at that center, also because of the poor plastic surgery availability at [facility].  Patient agrees to this, and referral made.   All questions answered.

## 2017-11-07 ENCOUNTER — OFFICE VISIT (OUTPATIENT)
Dept: SURGERY | Facility: CLINIC | Age: 27
End: 2017-11-07
Attending: SURGERY
Payer: COMMERCIAL

## 2017-11-07 VITALS
WEIGHT: 167.31 LBS | HEART RATE: 104 BPM | BODY MASS INDEX: 29.64 KG/M2 | HEIGHT: 63 IN | DIASTOLIC BLOOD PRESSURE: 77 MMHG | SYSTOLIC BLOOD PRESSURE: 117 MMHG

## 2017-11-07 DIAGNOSIS — C50.912 MALIGNANT NEOPLASM OF LEFT FEMALE BREAST, UNSPECIFIED ESTROGEN RECEPTOR STATUS, UNSPECIFIED SITE OF BREAST: Primary | ICD-10-CM

## 2017-11-07 PROCEDURE — 99205 OFFICE O/P NEW HI 60 MIN: CPT | Mod: S$GLB,,, | Performed by: SURGERY

## 2017-11-07 NOTE — PROGRESS NOTES
Breast Surgery  [facility]  Department of Surgery      REFERRING PROVIDER: No referring provider defined for this encounter.    Chief Complaint: No chief complaint on file.  Medical Oncologist: Yusuf Gaming MD   General Surgeon: Dr. Sapp     Subjective:      Patient ID: Berto Chun is a 27 y.o. female who presents with hx of left breast cancer, Stage IIB, 35mm (IDC, ER+(%), MN+(30-40%), wso4spr -). Initially presented with palpable mass in November 2016. In April 2017, mammogram showed 35 mm mass with pleomorphic calcifications in the left breast at 10 oclock, 2cm from the nipple- BIRADS4.  U/S guided Core biopsy performed 4/17 showed 5mm invasive ductal carcinoma with Intermediate grade background DCIS and microcalcifications, ER/MN+, ecf1ofn negative per FISH. Initial PET 04/25/17 showed 3.2 x 1.2 cm area of concern in left breast with max SUV of 3.2 in addition to 1.7 x 1.4cm left axillary lymph node with max SUV of 3.1. Axillary node was biopsied on 05/02/17 showed invasive ductal carcinoma. She completed 4 cycles of neoadjuvant Taxotere and four cycles of adjuvant Adriamycin/Cytoxan. Repeat PET scan now showing resolution of breast mass and decreased size in left axillary node. Chemotherapy has been complicated with anemia requiring transfusion.       Genetic testing was negative.     Findings at that time were the following:   Tumor size: 3.5 cm   Tumor grade: intermediate  Estrogen Receptor: +  Progesterone Receptor: +  Her-2 raeann: -   Lymph node status: +  Lymphatic invasion: unknown     Family History   Problem Relation Age of Onset    Lung cancer Paternal Grandfather     Leukemia Paternal Grandmother      ALL    Lung cancer Maternal Grandmother     Pancreatic cancer Maternal Grandfather     Hypertension Father     Hyperlipidemia Mother     Cancer Maternal Aunt     Colon cancer Maternal Aunt     Cancer Paternal Aunt     Colon cancer Paternal Aunt     Breast cancer Neg Hx      Ovarian cancer Neg Hx          GYN History:  Age of menarche was unknown. She is premenopausal  Last menstrual period was unknown. Patient denies hormonal therapy. Patient is .     Past Medical History:   Diagnosis Date    Abnormal Pap smear of cervix     Acne     Breast cancer     HPV (human papilloma virus) infection     Thyroid goiter      Past Surgical History:   Procedure Laterality Date    CERVICAL BIOPSY  W/ LOOP ELECTRODE EXCISION  , 2/15    x2    COLPOSCOPY  2014, 2015    mild dysplasia    left breast biopsy      WISDOM TOOTH EXTRACTION       Current Outpatient Prescriptions on File Prior to Visit   Medication Sig Dispense Refill    alprazolam (XANAX) 0.25 MG tablet TAKE 1 TO 2 TABLETS BY MOUTH EVERY NIGHT AT BEDTIME AS NEEDED FOR INSOMNIA 60 tablet 0    bisacodyl (DULCOLAX) 5 mg EC tablet Take 5 mg by mouth daily as needed for Constipation.      clindamycin-tretinoin (ZIANA) gel Apply a thin coat to the affected area every night.      desvenlafaxine 50 mg Tb24 Take 1 tablet by mouth once daily.       hydrocodone-acetaminophen 5-325mg (NORCO) 5-325 mg per tablet Take 1 tablet by mouth every 4 (four) hours as needed for Pain. 15 tablet 0    NAPROXEN SOD/DIPHENHYDRAM HCL (ALEVE PM ORAL) Take 2 tablets by mouth nightly as needed.      PNV#75/IRON FUM/FA/OM3/DHA/EPA (ONE A DAY WOMEN'S PRENATAL DHA ORAL) Take 1 tablet by mouth once daily.      prochlorperazine (COMPAZINE) 10 MG tablet TK 1 T PO Q 6 H PRF NV  6    water Liqd 150 mL with MILK OF MAGNESIA 400 mg/5 mL Susp 400 mg, diphenhydrAMINE 12.5 mg/5 mL Elix 60 mg, nystatin 100,000 unit/mL Susp 500,000 Units SWISH AND SWALLOW 5ML PO Q 4 H PRF MOUTH/THROAT PAIN  6     No current facility-administered medications on file prior to visit.      Social History     Social History    Marital status:      Spouse name: N/A    Number of children: N/A    Years of education: N/A     Occupational History    Not on file.      Social History Main Topics    Smoking status: Never Smoker    Smokeless tobacco: Never Used    Alcohol use No      Comment: Social    Drug use: No    Sexual activity: Yes     Partners: Male     Birth control/ protection: Condom     Other Topics Concern    Not on file     Social History Narrative    No narrative on file     Family History   Problem Relation Age of Onset    Lung cancer Paternal Grandfather     Leukemia Paternal Grandmother      ALL    Lung cancer Maternal Grandmother     Pancreatic cancer Maternal Grandfather     Hypertension Father     Hyperlipidemia Mother     Cancer Maternal Aunt     Colon cancer Maternal Aunt     Cancer Paternal Aunt     Colon cancer Paternal Aunt     Breast cancer Neg Hx     Ovarian cancer Neg Hx         Review of Systems   Constitutional: Positive for fatigue.   HENT: Negative for congestion.    Respiratory: Negative for cough and shortness of breath.    Cardiovascular: Negative for chest pain, palpitations and leg swelling.   Gastrointestinal: Negative for abdominal pain, diarrhea and nausea.   Genitourinary: Negative for dysuria.     Objective:   LMP 06/13/2017 (Within Weeks)     Physical Exam   Constitutional: She is oriented to person, place, and time. She appears well-developed and well-nourished.   HENT:   Head: Normocephalic and atraumatic.   Eyes: Conjunctivae are normal. Pupils are equal, round, and reactive to light.   Neck: Normal range of motion. Neck supple.   Cardiovascular: Normal rate and regular rhythm.    Pulmonary/Chest: Effort normal and breath sounds normal. Right breast exhibits no inverted nipple, no mass, no nipple discharge, no skin change and no tenderness. Left breast exhibits mass. Left breast exhibits no inverted nipple, no nipple discharge, no skin change and no tenderness.   Small palpable mass in left breast near 2 oclock. Unable to palpable mass in previous palpable mass at 10 oclock. Palpable lymph node in left axilla. No  bulky lymphadenopathy.    Abdominal: Soft. Bowel sounds are normal. She exhibits no distension. There is no tenderness.   Musculoskeletal: Normal range of motion.   Neurological: She is alert and oriented to person, place, and time.   Skin: Skin is warm and dry.         Radiology review: Images personally reviewed by me in the clinic.   Bilateral diagnostic Mammogram: 04/12/17  The breast tissue is extremely dense, which could obscure a lesion on  mammography.  There is a focal asymmetric density measuring 35 millimeters with associated  pleomorphic calcifications seen in the posterior region of the left breast at 10  o'clock located 2 centimeters from the nipple.  This correlates with the patient's palpable abnormality.  Digital tomosynthesis was performed and used in the interpretation of the images.  These images  were processed to produce digital images analyzed for potential  Abnormalities.    LEFT LIMITED ULTRASOUND FINDINGS:  High-resolution real-time ultrasound scanning was performed.  Ultrasound is suggestive of an irregular elongated solid mass with angular  margins seen in the posterior region of the left breast at 10 o'clock located 2  centimeters from the nipple.    PET CT scan 04/25/17:  Findings:  HEAD/NECK:  Multiple deposits of hypermetabolic brown fat are present in the bilateral cervical regions, bilateral supraclavicular regions, and bilateral infraclavicular regions which are of no clinical significance.  CHEST:  There is an irregular hypermetabolic left breast mass at the 10 o'clock position corresponding to the known infiltrating ductal carcinoma.  The mass measures 3.2 x 1.2 cm and has a max SUV of 3.2 with a hypermetabolic volume of 16.4 cc.  There is an enlarged hypermetabolic left axillary lymph node which is highly suspicious for neoplastic adenopathy.  The node measures 1.7 x 1.4 cm and has a max SUV of 3.1 with a hypermetabolic volume of 7.5 cc.  There is a 4 mm noncalcified nodule in  the posterior aspect of the right lower lobe on image #74.  The nodule is too small to accurately characterize metabolically and is of uncertain significance.  Surveillance with a repeat CT in 6 months is recommended.  ABDOMEN/PELVIS:  No significant abnormal hypermetabolic foci are identified within the abdomen or pelvis.  No lymphadenopathy or ascites are present.  The adrenal glands are normal.  SKELETON:  No significant abnormal hypermetabolic foci are identified within the skeleton.  There are no findings to suggest osseous neoplastic disease.    PET CT scan 10/26/17:  1.Interval improvement since 4/25/17 with the breast region of concern demonstrating mild hypermetabolic activity in comparison to background and the left axillary node appearing smaller and of background activity. In the absence of a surgical history the breast activity in the region of concern is somewhat concerning for residual disease even though the activity is only mildly above background  2. Increased metabolic activity is noted along the cutaneous surface of the inner thigh on the right possibly relating to a region of contusion or cellulitis. Please clinically correlate. Direct visualization could be performed if necessary  3. Right thyroid nodule, ultrasound may be helpful for better evaluation, this is not hypermetabolic  4. Increased osseous metabolic activity diffusely most likely relating to patient's history of chemotherapy without focal mass lesion identified.   5. Pulmonary nodule unchanged of 4 mm. Followup for at least one year a stability would be suggested      Path:   Supplemental Diagnosis  THE RESULTS OF IMMUNE RECEPTOR STUDIES ARE AS FOLLOWS: ER IS POSITIVE WITH BETWEEN 90  % OF TUMOR CELL NUCLEI STAINING STRONGLY. AK IS POSITIVE WITH BETWEEN 30-40% OF  TUMOR CELL NUCLEI STAINING STRONGLY. IMMUNOSTAIN FOR HER-2/MAYA IS EQUIVOCAL AND I WILL  SEND THIS TISSUE BLOCK OUT FOR FISH ANALYSIS. THE POSITIVE AND NEGATIVE  CONTROLS STAIN  APPROPRIATELY.  Supplemental:  HER2, BREAST TUMOR, FISH, TS:  RESULT SUMMARY:  Negative.  INTERPRETATION:  The tumor cells have no evidence of HER2 gene amplification (per ASCO/CAP guidelines).  RESULT:  nuc ortiz (D17Z1,HER2)x1-3  The HER2:D17Z1 ratio is 0.95.  Average HER2 signals per cell is 2.1.  Average D17Z1 signals per cell is 2.2.  Note: Report attached.    FINAL PATHOLOGIC DIAGNOSIS  CORE BIOPSY, LEFT BREAST SHOWING INTERMEDIATE GRADE DCIS AND INVASIVE DUCT CARCINOMA  (3, 2, 1) WITH MICROCALCIFICATIONS PRESENT. THE LARGEST LINEAR MEASUREMENT OF INVASIVE  CARCINOMA IS APPROXIMATELY 5 MM. THE RESULTS OF IMMUNE RECEPTOR STUDIES ARE PENDING    Assessment:       Left breast cancer   Plan:     Options for management were discussed with the patient and her family. We reviewed the existing data noting the equivalency of breast conserving surgery with radiation therapy and mastectomy. We also reviewed the guidelines of the National Comprehensive Cancer Network for Stage IIb breast carcinoma. We discussed the need for lumpectomy margins to be negative for carcinoma, the necessity for postoperative radiation therapy after breast conservation in most cases, the possibility of a failed or false negative sentinel lymph node biopsy and the potential need for complete lymphadenectomy for a failed or positive sentinel lymph node biopsy were fully discussed. In the setting of mastectomy, delayed or immediate reconstruction options are available and were discussed.     In the setting of lumpectomy, radiation therapy would be recommended majority of the time.  The duration and treatment side effects were discussed with the patient.  This will coordinated with the radiation oncologist pending final pathology.    We also discussed the role of systemic therapy in the treatment of early stage breast cancer.  We discussed that this is based on tumor biology and saritha status and will be determined based on final  pathology.  We discussed that if the cancer is hormone positive, endocrine therapy would be recommended in most cases and its use can reduce the risk of recurrence as well as improve survival. Side effects of treatment were briefly discussed. We also discussed the potential role for chemotherapy based on a number of factors such as tumor phenotype (ER+ vs. triple negative vs. Eae4yzt+) and this would be determined in coordination with the medical oncologist.    The patient, in consultation with her family, has elected to proceed with bilateral vs. left total mastectomy and sentinel lymph node biopsy with surgical reconstruction (expanders vs. Implants). The operative risks of bleeding, infection, recurrence, scarring, and anesthetic complications and the possibility of requiring further surgery were all noted and informed consent obtained.    Would be good candidate for ALLIANCE/B51 clinical trial. Needs ultrasound of left axilla with clip placement in previous positive axillary lymph node to qualify for clinical trial. Recommending MRI breast prior to surgery  Will schedule surgery in next 2 weeks.     Plastics Referral   F/u genetic testing - unable to locate results. If only BRCA1/2 performed, will need full panel     Patient was educated on breast cancer, receptors, wire localization lumpectomy, mastectomy, sentinel lymph node mapping and biopsy, axillary lymph node dissection, reconstruction, breast prosthesis with post-mastectomy bra and radiation therapy. Patient [facility].  All her questions were answered.    Total time spent with the patient: 90 minutes.  60 minutes of face to face consultation and 30 minutes of chart review and coordination of care.

## 2017-11-09 ENCOUNTER — PATIENT MESSAGE (OUTPATIENT)
Dept: SURGERY | Facility: OTHER | Age: 27
End: 2017-11-09

## 2017-11-13 ENCOUNTER — PATIENT MESSAGE (OUTPATIENT)
Dept: SURGERY | Facility: CLINIC | Age: 27
End: 2017-11-13

## 2017-11-14 DIAGNOSIS — C50.912 MALIGNANT NEOPLASM OF LEFT FEMALE BREAST, UNSPECIFIED ESTROGEN RECEPTOR STATUS, UNSPECIFIED SITE OF BREAST: Primary | ICD-10-CM

## 2017-11-15 ENCOUNTER — HOSPITAL ENCOUNTER (OUTPATIENT)
Dept: RADIOLOGY | Facility: HOSPITAL | Age: 27
Discharge: HOME | End: 2017-11-15
Attending: SURGERY
Payer: COMMERCIAL

## 2017-11-15 DIAGNOSIS — C50.912 MALIGNANT NEOPLASM OF LEFT FEMALE BREAST, UNSPECIFIED ESTROGEN RECEPTOR STATUS, UNSPECIFIED SITE OF BREAST: ICD-10-CM

## 2017-11-15 PROCEDURE — 0159T MRI BREAST BILATERAL: CPT | Mod: 26,,, | Performed by: STUDENT IN AN ORGANIZED HEALTH CARE EDUCATION/TRAINING PROGRAM

## 2017-11-15 PROCEDURE — 0159T MRI BREAST BILATERAL: CPT | Mod: TC

## 2017-11-15 PROCEDURE — 25500020 PHARM REV CODE 255: Performed by: SURGERY

## 2017-11-15 PROCEDURE — 77059 MRI BREAST BILATERAL: CPT | Mod: 26,,, | Performed by: STUDENT IN AN ORGANIZED HEALTH CARE EDUCATION/TRAINING PROGRAM

## 2017-11-15 PROCEDURE — A9577 INJ MULTIHANCE: HCPCS | Performed by: SURGERY

## 2017-11-15 PROCEDURE — 76642 ULTRASOUND BREAST LIMITED: CPT | Mod: TC,LT

## 2017-11-15 PROCEDURE — 76642 ULTRASOUND BREAST LIMITED: CPT | Mod: 26,LT,, | Performed by: RADIOLOGY

## 2017-11-15 RX ORDER — SODIUM CHLORIDE 0.9 % (FLUSH) 0.9 %
10 SYRINGE (ML) INJECTION
Status: CANCELLED | OUTPATIENT
Start: 2017-11-20

## 2017-11-15 RX ORDER — HEPARIN 100 UNIT/ML
500 SYRINGE INTRAVENOUS
Status: CANCELLED | OUTPATIENT
Start: 2017-11-20

## 2017-11-15 RX ADMIN — GADOBENATE DIMEGLUMINE 16 ML: 529 INJECTION, SOLUTION INTRAVENOUS at 04:11

## 2017-11-16 ENCOUNTER — PATIENT MESSAGE (OUTPATIENT)
Dept: HEMATOLOGY/ONCOLOGY | Facility: CLINIC | Age: 27
End: 2017-11-16

## 2017-11-16 ENCOUNTER — ANESTHESIA EVENT (OUTPATIENT)
Dept: SURGERY | Facility: OTHER | Age: 27
DRG: 580 | End: 2017-11-16
Payer: COMMERCIAL

## 2017-11-16 ENCOUNTER — HOSPITAL ENCOUNTER (OUTPATIENT)
Dept: PREADMISSION TESTING | Facility: OTHER | Age: 27
Discharge: HOME | End: 2017-11-16
Attending: SURGERY
Payer: COMMERCIAL

## 2017-11-16 VITALS
DIASTOLIC BLOOD PRESSURE: 65 MMHG | OXYGEN SATURATION: 100 % | TEMPERATURE: 99 F | SYSTOLIC BLOOD PRESSURE: 109 MMHG | WEIGHT: 165 LBS | HEIGHT: 63 IN | HEART RATE: 86 BPM | BODY MASS INDEX: 29.23 KG/M2

## 2017-11-16 LAB
HCT VFR BLD AUTO: 34.9 %
HGB BLD-MCNC: 11.7 G/DL

## 2017-11-16 PROCEDURE — 36415 COLL VENOUS BLD VENIPUNCTURE: CPT

## 2017-11-16 PROCEDURE — 85018 HEMOGLOBIN: CPT

## 2017-11-16 PROCEDURE — 85014 HEMATOCRIT: CPT

## 2017-11-16 RX ORDER — NICOTINE POLACRILEX 2 MG
GUM BUCCAL
COMMUNITY
End: 2022-07-20

## 2017-11-16 RX ORDER — FAMOTIDINE 20 MG/1
20 TABLET, FILM COATED ORAL
Status: CANCELLED | OUTPATIENT
Start: 2017-11-16 | End: 2017-11-16

## 2017-11-16 RX ORDER — SODIUM CHLORIDE, SODIUM LACTATE, POTASSIUM CHLORIDE, CALCIUM CHLORIDE 600; 310; 30; 20 MG/100ML; MG/100ML; MG/100ML; MG/100ML
INJECTION, SOLUTION INTRAVENOUS CONTINUOUS
Status: CANCELLED | OUTPATIENT
Start: 2017-11-16

## 2017-11-16 RX ORDER — OXYCODONE HYDROCHLORIDE 5 MG/1
5 TABLET ORAL ONCE AS NEEDED
Status: CANCELLED | OUTPATIENT
Start: 2017-11-16 | End: 2017-11-16

## 2017-11-16 RX ORDER — LIDOCAINE HYDROCHLORIDE 10 MG/ML
1 INJECTION, SOLUTION EPIDURAL; INFILTRATION; INTRACAUDAL; PERINEURAL ONCE
Status: CANCELLED | OUTPATIENT
Start: 2017-11-16 | End: 2017-11-16

## 2017-11-16 NOTE — DISCHARGE INSTRUCTIONS
PRE-ADMIT TESTING -  [phone]    [address]  [place] [facility]          Your surgery has been scheduled at [facility]. We are pleased to have the opportunity to serve you. For Further Information please call [phone].    On the day of surgery please report to the Information Desk on the 1st floor.    · CONTACT YOUR PHYSICIAN'S OFFICE THE DAY PRIOR TO YOUR SURGERY TO OBTAIN YOUR ARRIVAL TIME.     · The evening before surgery do not eat anything after 9 p.m. ( this includes hard candy, chewing gum and mints).  You may only have GATORADE, POWERADE AND WATER  from 9 p.m. until you leave your home.   DO NOT DRINK ANY LIQUIDS ON THE WAY TO THE HOSPITAL.      SPECIAL MEDICATION INSTRUCTIONS: TAKE medications checked off by the Anesthesiologist on your Medication List.    Angiogram Patients: Take medications as instructed by your physician, including aspirin.     Surgery Patients:    If you take ASPIRIN - Your PHYSICIAN/SURGEON will need to inform you IF/OR when you need to stop taking aspirin prior to your surgery.     Do Not take any medications containing IBUPROFEN.  Do Not Wear any make-up or dark nail polish   (especially eye make-up) to surgery. If you come to surgery with makeup on you will be required to remove the makeup or nail polish.    Do not shave your surgical area at least 5 days prior to your surgery. The surgical prep will be performed at the hospital according to Infection Control regulations.    Leave all valuables at home.   Do Not wear any jewelry or watches, including any metal in body piercings.  Contact Lens must be removed before surgery. Either do not wear the contact lens or bring a case and solution for storage.  Please bring a container for eyeglasses or dentures as required.  Bring any paperwork your physician has provided, such as consent forms,  history and physicals, doctor's orders, etc.   Bring comfortable clothes that are loose fitting to wear upon  discharge. Take into consideration the type of surgery being performed.  Maintain your diet as advised per your physician the day prior to surgery.      Adequate rest the night before surgery is advised.   Park in the Parking lot behind the hospital or in the [facility] across the street from the parking lot. Parking is complimentary.  If you will be discharged the same day as your procedure, please arrange for a responsible adult to drive you home or to accompany you if traveling by taxi.   YOU WILL NOT BE PERMITTED TO DRIVE OR TO LEAVE THE HOSPITAL ALONE AFTER SURGERY.   It is strongly recommended that you arrange for someone to remain with you for the first 24 hrs following your surgery.       Thank you for your cooperation.  The Staff of [facility].        Bathing Instructions                                                                 Please shower the evening before and morning of your procedure with    ANTIBACTERIAL SOAP. ( DIAL, etc )  Concentrate on the surgical area   for at least 3 minutes and rinse completely. Dry off as usual.   Do not use any deodorant, powder, body lotions, perfume, after shave or    cologne.

## 2017-11-16 NOTE — ANESTHESIA PREPROCEDURE EVALUATION
11/16/2017  Berto Chun is a 27 y.o., female.    Anesthesia Evaluation    I have reviewed the Patient Summary Reports.    I have reviewed the Nursing Notes.   I have reviewed the Medications.     Review of Systems  Anesthesia Hx:  No problems with previous Anesthesia    Social:  Non-Smoker, No Alcohol Use    Hematology/Oncology:         -- Anemia: Current/Recent Cancer. left no axillary node dissection no lymphedema chemotherapy   EENT/Dental:EENT/Dental Normal   Cardiovascular:  Cardiovascular Normal Exercise tolerance: good     Pulmonary:  Pulmonary Normal    Renal/:  Renal/ Normal     Hepatic/GI:  Hepatic/GI Normal    Musculoskeletal:  Musculoskeletal Normal    Neurological:  Neurology Normal    Endocrine:   Thyroid goiter.   Dermatological:  Skin Normal    Psych:  Psychiatric Normal           Physical Exam  General:  Well nourished    Airway/Jaw/Neck:  Airway Findings: Mouth Opening: Normal Tongue: Normal  General Airway Assessment: Adult, Good  Mallampati: I  Jaw/Neck Findings:  Neck ROM: Normal ROM      Dental:  Dental Findings: In tact        Mental Status:  Mental Status Findings:  Cooperative, Alert and Oriented         Anesthesia Plan  Type of Anesthesia, risks & benefits discussed:  Anesthesia Type:  general  Patient's Preference:   Intra-op Monitoring Plan: standard ASA monitors  Intra-op Monitoring Plan Comments:   Post Op Pain Control Plan:   Post Op Pain Control Plan Comments:   Induction:   IV  Beta Blocker:  Patient is not currently on a Beta-Blocker (No further documentation required).       Informed Consent: Patient understands risks and agrees with Anesthesia plan.  Questions answered. Anesthesia consent signed with patient.  ASA Score: 2     Day of Surgery Review of History & Physical:    H&P update referred to the surgeon.     Anesthesia Plan Notes: Labs ordered.        Ready  For Surgery From Anesthesia Perspective.

## 2017-11-17 ENCOUNTER — TELEPHONE (OUTPATIENT)
Dept: INFUSION THERAPY | Facility: HOSPITAL | Age: 27
End: 2017-11-17

## 2017-11-17 ENCOUNTER — TELEPHONE (OUTPATIENT)
Dept: SURGERY | Facility: CLINIC | Age: 27
End: 2017-11-17

## 2017-11-17 NOTE — TELEPHONE ENCOUNTER
Called patient with confirmation of surgery time for Monday. Instructed patient to arrive at 5am for 7am start, discussed NPO after midnight, bathe with antibacterial soap the night before/morning of surgery, and no deoderant/lotion on skin the morning of. Patient verbalized understanding to all information.

## 2017-11-17 NOTE — TELEPHONE ENCOUNTER
Returned patient call she needed to reschedule port flush appointment due to she is having surgery that day and they are not using her port r/s for 2 weeks after.

## 2017-11-20 ENCOUNTER — SURGERY (OUTPATIENT)
Age: 27
End: 2017-11-20

## 2017-11-20 ENCOUNTER — ANESTHESIA (OUTPATIENT)
Dept: SURGERY | Facility: OTHER | Age: 27
DRG: 580 | End: 2017-11-20
Payer: COMMERCIAL

## 2017-11-20 ENCOUNTER — HOSPITAL ENCOUNTER (OUTPATIENT)
Dept: RADIOLOGY | Facility: OTHER | Age: 27
Discharge: HOME | DRG: 580 | End: 2017-11-20
Attending: SURGERY | Admitting: SURGERY
Payer: COMMERCIAL

## 2017-11-20 ENCOUNTER — HOSPITAL ENCOUNTER (INPATIENT)
Facility: OTHER | Age: 27
LOS: 2 days | Discharge: HOME | DRG: 580 | End: 2017-11-22
Attending: SURGERY | Admitting: SURGERY
Payer: COMMERCIAL

## 2017-11-20 DIAGNOSIS — C50.912 MALIGNANT NEOPLASM OF LEFT FEMALE BREAST, UNSPECIFIED ESTROGEN RECEPTOR STATUS, UNSPECIFIED SITE OF BREAST: ICD-10-CM

## 2017-11-20 DIAGNOSIS — Z85.3 HISTORY OF BREAST CANCER: Primary | ICD-10-CM

## 2017-11-20 LAB
ABO + RH BLD: NORMAL
B-HCG UR QL: NEGATIVE
BLD GP AB SCN CELLS X3 SERPL QL: NORMAL
CTP QC/QA: YES

## 2017-11-20 PROCEDURE — 38525 BIOPSY/REMOVAL LYMPH NODES: CPT | Mod: 51,LT,, | Performed by: SURGERY

## 2017-11-20 PROCEDURE — 63600175 PHARM REV CODE 636 W HCPCS: Performed by: ANESTHESIOLOGY

## 2017-11-20 PROCEDURE — 36000707: Performed by: SURGERY

## 2017-11-20 PROCEDURE — 25000003 PHARM REV CODE 250: Performed by: SURGERY

## 2017-11-20 PROCEDURE — 63600175 PHARM REV CODE 636 W HCPCS: Performed by: SURGERY

## 2017-11-20 PROCEDURE — 71000033 HC RECOVERY, INTIAL HOUR: Performed by: SURGERY

## 2017-11-20 PROCEDURE — 0HHV0NZ INSERTION OF TISSUE EXPANDER INTO BILATERAL BREAST, OPEN APPROACH: ICD-10-PCS | Performed by: SURGERY

## 2017-11-20 PROCEDURE — 88305 TISSUE EXAM BY PATHOLOGIST: CPT | Performed by: PATHOLOGY

## 2017-11-20 PROCEDURE — 88360 TUMOR IMMUNOHISTOCHEM/MANUAL: CPT | Performed by: PATHOLOGY

## 2017-11-20 PROCEDURE — 0KTJ0ZZ RESECTION OF LEFT THORAX MUSCLE, OPEN APPROACH: ICD-10-PCS | Performed by: SURGERY

## 2017-11-20 PROCEDURE — 27201423 OPTIME MED/SURG SUP & DEVICES STERILE SUPPLY: Performed by: SURGERY

## 2017-11-20 PROCEDURE — 88331 PATH CONSLTJ SURG 1 BLK 1SPC: CPT | Mod: 26,,, | Performed by: PATHOLOGY

## 2017-11-20 PROCEDURE — 63600175 PHARM REV CODE 636 W HCPCS: Performed by: NURSE ANESTHETIST, CERTIFIED REGISTERED

## 2017-11-20 PROCEDURE — 86901 BLOOD TYPING SEROLOGIC RH(D): CPT

## 2017-11-20 PROCEDURE — 88341 IMHCHEM/IMCYTCHM EA ADD ANTB: CPT | Mod: 26,,, | Performed by: PATHOLOGY

## 2017-11-20 PROCEDURE — 38900 IO MAP OF SENT LYMPH NODE: CPT | Mod: ,,, | Performed by: SURGERY

## 2017-11-20 PROCEDURE — 88307 TISSUE EXAM BY PATHOLOGIST: CPT | Mod: 26,,, | Performed by: PATHOLOGY

## 2017-11-20 PROCEDURE — 88342 IMHCHEM/IMCYTCHM 1ST ANTB: CPT | Mod: 26,,, | Performed by: PATHOLOGY

## 2017-11-20 PROCEDURE — 0HTV0ZZ RESECTION OF BILATERAL BREAST, OPEN APPROACH: ICD-10-PCS | Performed by: SURGERY

## 2017-11-20 PROCEDURE — 86900 BLOOD TYPING SEROLOGIC ABO: CPT

## 2017-11-20 PROCEDURE — 81025 URINE PREGNANCY TEST: CPT | Performed by: SPECIALIST

## 2017-11-20 PROCEDURE — 88305 TISSUE EXAM BY PATHOLOGIST: CPT | Mod: 26,,, | Performed by: PATHOLOGY

## 2017-11-20 PROCEDURE — 37000008 HC ANESTHESIA 1ST 15 MINUTES: Performed by: SURGERY

## 2017-11-20 PROCEDURE — C1789 PROSTHESIS, BREAST, IMP: HCPCS | Performed by: SURGERY

## 2017-11-20 PROCEDURE — 36000706: Performed by: SURGERY

## 2017-11-20 PROCEDURE — 25000003 PHARM REV CODE 250: Performed by: ANESTHESIOLOGY

## 2017-11-20 PROCEDURE — 25000003 PHARM REV CODE 250: Performed by: NURSE ANESTHETIST, CERTIFIED REGISTERED

## 2017-11-20 PROCEDURE — 37000009 HC ANESTHESIA EA ADD 15 MINS: Performed by: SURGERY

## 2017-11-20 PROCEDURE — 0KTH0ZZ RESECTION OF RIGHT THORAX MUSCLE, OPEN APPROACH: ICD-10-PCS | Performed by: SURGERY

## 2017-11-20 PROCEDURE — 07B60ZX EXCISION OF LEFT AXILLARY LYMPHATIC, OPEN APPROACH, DIAGNOSTIC: ICD-10-PCS | Performed by: SURGERY

## 2017-11-20 PROCEDURE — 71000039 HC RECOVERY, EACH ADD'L HOUR: Performed by: SURGERY

## 2017-11-20 PROCEDURE — 19303 MAST SIMPLE COMPLETE: CPT | Mod: 50,,, | Performed by: SURGERY

## 2017-11-20 PROCEDURE — 38792 RA TRACER ID OF SENTINL NODE: CPT | Mod: TC

## 2017-11-20 DEVICE — IMPLANTABLE DEVICE: Type: IMPLANTABLE DEVICE | Site: BREAST | Status: FUNCTIONAL

## 2017-11-20 RX ORDER — BACITRACIN 50000 [IU]/1
INJECTION, POWDER, FOR SOLUTION INTRAMUSCULAR
Status: DISCONTINUED | OUTPATIENT
Start: 2017-11-20 | End: 2017-11-20 | Stop reason: HOSPADM

## 2017-11-20 RX ORDER — FENTANYL CITRATE 50 UG/ML
INJECTION, SOLUTION INTRAMUSCULAR; INTRAVENOUS
Status: DISCONTINUED | OUTPATIENT
Start: 2017-11-20 | End: 2017-11-20

## 2017-11-20 RX ORDER — SODIUM CHLORIDE 0.9 % (FLUSH) 0.9 %
3 SYRINGE (ML) INJECTION
Status: DISCONTINUED | OUTPATIENT
Start: 2017-11-20 | End: 2017-11-20

## 2017-11-20 RX ORDER — OXYCODONE HYDROCHLORIDE 5 MG/1
5 TABLET ORAL ONCE AS NEEDED
Status: DISCONTINUED | OUTPATIENT
Start: 2017-11-20 | End: 2017-11-20 | Stop reason: HOSPADM

## 2017-11-20 RX ORDER — DEXAMETHASONE SODIUM PHOSPHATE 4 MG/ML
INJECTION, SOLUTION INTRA-ARTICULAR; INTRALESIONAL; INTRAMUSCULAR; INTRAVENOUS; SOFT TISSUE
Status: DISCONTINUED | OUTPATIENT
Start: 2017-11-20 | End: 2017-11-20

## 2017-11-20 RX ORDER — ACETAMINOPHEN 10 MG/ML
INJECTION, SOLUTION INTRAVENOUS
Status: DISCONTINUED | OUTPATIENT
Start: 2017-11-20 | End: 2017-11-20

## 2017-11-20 RX ORDER — GENTAMICIN SULFATE 40 MG/ML
INJECTION, SOLUTION INTRAMUSCULAR; INTRAVENOUS
Status: DISCONTINUED | OUTPATIENT
Start: 2017-11-20 | End: 2017-11-20 | Stop reason: HOSPADM

## 2017-11-20 RX ORDER — SODIUM CHLORIDE, SODIUM LACTATE, POTASSIUM CHLORIDE, CALCIUM CHLORIDE 600; 310; 30; 20 MG/100ML; MG/100ML; MG/100ML; MG/100ML
INJECTION, SOLUTION INTRAVENOUS CONTINUOUS
Status: DISCONTINUED | OUTPATIENT
Start: 2017-11-20 | End: 2017-11-20

## 2017-11-20 RX ORDER — OXYCODONE HYDROCHLORIDE 5 MG/1
5 TABLET ORAL
Status: DISCONTINUED | OUTPATIENT
Start: 2017-11-20 | End: 2017-11-20 | Stop reason: HOSPADM

## 2017-11-20 RX ORDER — HYDROCODONE BITARTRATE AND ACETAMINOPHEN 10; 325 MG/1; MG/1
1 TABLET ORAL EVERY 4 HOURS PRN
Status: DISCONTINUED | OUTPATIENT
Start: 2017-11-20 | End: 2017-11-22 | Stop reason: HOSPADM

## 2017-11-20 RX ORDER — ONDANSETRON 2 MG/ML
4 INJECTION INTRAMUSCULAR; INTRAVENOUS EVERY 6 HOURS PRN
Status: DISCONTINUED | OUTPATIENT
Start: 2017-11-20 | End: 2017-11-22 | Stop reason: HOSPADM

## 2017-11-20 RX ORDER — HYDROCODONE BITARTRATE AND ACETAMINOPHEN 5; 325 MG/1; MG/1
1 TABLET ORAL EVERY 4 HOURS PRN
Status: DISCONTINUED | OUTPATIENT
Start: 2017-11-20 | End: 2017-11-20

## 2017-11-20 RX ORDER — NEOSTIGMINE METHYLSULFATE 1 MG/ML
INJECTION, SOLUTION INTRAVENOUS
Status: DISCONTINUED | OUTPATIENT
Start: 2017-11-20 | End: 2017-11-20

## 2017-11-20 RX ORDER — MEPERIDINE HYDROCHLORIDE 50 MG/ML
12.5 INJECTION INTRAMUSCULAR; INTRAVENOUS; SUBCUTANEOUS ONCE AS NEEDED
Status: DISCONTINUED | OUTPATIENT
Start: 2017-11-20 | End: 2017-11-20 | Stop reason: HOSPADM

## 2017-11-20 RX ORDER — HEPARIN SODIUM 5000 [USP'U]/ML
5000 INJECTION, SOLUTION INTRAVENOUS; SUBCUTANEOUS
Status: COMPLETED | OUTPATIENT
Start: 2017-11-20 | End: 2017-11-20

## 2017-11-20 RX ORDER — HETASTARCH 6 G/100ML
INJECTION, SOLUTION INTRAVENOUS CONTINUOUS PRN
Status: DISCONTINUED | OUTPATIENT
Start: 2017-11-20 | End: 2017-11-20

## 2017-11-20 RX ORDER — HYDROMORPHONE HYDROCHLORIDE 2 MG/ML
0.4 INJECTION, SOLUTION INTRAMUSCULAR; INTRAVENOUS; SUBCUTANEOUS EVERY 5 MIN PRN
Status: DISCONTINUED | OUTPATIENT
Start: 2017-11-20 | End: 2017-11-20 | Stop reason: HOSPADM

## 2017-11-20 RX ORDER — FENTANYL CITRATE 50 UG/ML
25 INJECTION, SOLUTION INTRAMUSCULAR; INTRAVENOUS EVERY 5 MIN PRN
Status: DISCONTINUED | OUTPATIENT
Start: 2017-11-20 | End: 2017-11-20 | Stop reason: HOSPADM

## 2017-11-20 RX ORDER — FAMOTIDINE 20 MG/1
20 TABLET, FILM COATED ORAL
Status: DISCONTINUED | OUTPATIENT
Start: 2017-11-20 | End: 2017-11-20

## 2017-11-20 RX ORDER — CEFAZOLIN SODIUM 2 G/50ML
2 SOLUTION INTRAVENOUS EVERY 6 HOURS
Status: COMPLETED | OUTPATIENT
Start: 2017-11-20 | End: 2017-11-21

## 2017-11-20 RX ORDER — SODIUM CHLORIDE, SODIUM LACTATE, POTASSIUM CHLORIDE, CALCIUM CHLORIDE 600; 310; 30; 20 MG/100ML; MG/100ML; MG/100ML; MG/100ML
INJECTION, SOLUTION INTRAVENOUS CONTINUOUS PRN
Status: DISCONTINUED | OUTPATIENT
Start: 2017-11-20 | End: 2017-11-20

## 2017-11-20 RX ORDER — MIDAZOLAM HYDROCHLORIDE 1 MG/ML
INJECTION INTRAMUSCULAR; INTRAVENOUS
Status: DISCONTINUED | OUTPATIENT
Start: 2017-11-20 | End: 2017-11-20

## 2017-11-20 RX ORDER — LIDOCAINE HCL/PF 100 MG/5ML
SYRINGE (ML) INTRAVENOUS
Status: DISCONTINUED | OUTPATIENT
Start: 2017-11-20 | End: 2017-11-20

## 2017-11-20 RX ORDER — HYDROMORPHONE HYDROCHLORIDE 1 MG/ML
1 INJECTION, SOLUTION INTRAMUSCULAR; INTRAVENOUS; SUBCUTANEOUS EVERY 4 HOURS PRN
Status: DISCONTINUED | OUTPATIENT
Start: 2017-11-20 | End: 2017-11-22 | Stop reason: HOSPADM

## 2017-11-20 RX ORDER — ALPRAZOLAM 0.25 MG/1
0.25 TABLET ORAL NIGHTLY PRN
Status: DISCONTINUED | OUTPATIENT
Start: 2017-11-20 | End: 2017-11-22 | Stop reason: HOSPADM

## 2017-11-20 RX ORDER — CEFAZOLIN SODIUM 1 G/3ML
INJECTION, POWDER, FOR SOLUTION INTRAMUSCULAR; INTRAVENOUS
Status: DISCONTINUED | OUTPATIENT
Start: 2017-11-20 | End: 2017-11-20 | Stop reason: HOSPADM

## 2017-11-20 RX ORDER — ONDANSETRON 8 MG/1
8 TABLET, ORALLY DISINTEGRATING ORAL EVERY 6 HOURS PRN
Status: DISCONTINUED | OUTPATIENT
Start: 2017-11-20 | End: 2017-11-22 | Stop reason: HOSPADM

## 2017-11-20 RX ORDER — PHENYLEPHRINE HYDROCHLORIDE 10 MG/ML
INJECTION INTRAVENOUS
Status: DISCONTINUED | OUTPATIENT
Start: 2017-11-20 | End: 2017-11-20

## 2017-11-20 RX ORDER — SODIUM CHLORIDE 0.9 % (FLUSH) 0.9 %
3 SYRINGE (ML) INJECTION
Status: DISCONTINUED | OUTPATIENT
Start: 2017-11-20 | End: 2017-11-22 | Stop reason: HOSPADM

## 2017-11-20 RX ORDER — GLYCOPYRROLATE 0.2 MG/ML
INJECTION INTRAMUSCULAR; INTRAVENOUS
Status: DISCONTINUED | OUTPATIENT
Start: 2017-11-20 | End: 2017-11-20

## 2017-11-20 RX ORDER — ONDANSETRON 2 MG/ML
4 INJECTION INTRAMUSCULAR; INTRAVENOUS DAILY PRN
Status: DISCONTINUED | OUTPATIENT
Start: 2017-11-20 | End: 2017-11-20 | Stop reason: HOSPADM

## 2017-11-20 RX ORDER — ISOSULFAN BLUE 50 MG/5ML
INJECTION, SOLUTION SUBCUTANEOUS
Status: DISCONTINUED | OUTPATIENT
Start: 2017-11-20 | End: 2017-11-20 | Stop reason: HOSPADM

## 2017-11-20 RX ORDER — PROPOFOL 10 MG/ML
VIAL (ML) INTRAVENOUS
Status: DISCONTINUED | OUTPATIENT
Start: 2017-11-20 | End: 2017-11-20

## 2017-11-20 RX ORDER — SUCCINYLCHOLINE CHLORIDE 20 MG/ML
INJECTION INTRAMUSCULAR; INTRAVENOUS
Status: DISCONTINUED | OUTPATIENT
Start: 2017-11-20 | End: 2017-11-20

## 2017-11-20 RX ORDER — LIDOCAINE HYDROCHLORIDE 10 MG/ML
1 INJECTION, SOLUTION EPIDURAL; INFILTRATION; INTRACAUDAL; PERINEURAL ONCE
Status: DISCONTINUED | OUTPATIENT
Start: 2017-11-20 | End: 2017-11-20 | Stop reason: HOSPADM

## 2017-11-20 RX ORDER — AMOXICILLIN 250 MG
1 CAPSULE ORAL 2 TIMES DAILY
Status: DISCONTINUED | OUTPATIENT
Start: 2017-11-20 | End: 2017-11-22 | Stop reason: HOSPADM

## 2017-11-20 RX ORDER — INDOCYANINE GREEN AND WATER 25 MG
KIT INJECTION
Status: DISCONTINUED | OUTPATIENT
Start: 2017-11-20 | End: 2017-11-20

## 2017-11-20 RX ORDER — ROCURONIUM BROMIDE 10 MG/ML
INJECTION, SOLUTION INTRAVENOUS
Status: DISCONTINUED | OUTPATIENT
Start: 2017-11-20 | End: 2017-11-20

## 2017-11-20 RX ORDER — CEFAZOLIN SODIUM 2 G/50ML
2 SOLUTION INTRAVENOUS
Status: COMPLETED | OUTPATIENT
Start: 2017-11-20 | End: 2017-11-20

## 2017-11-20 RX ORDER — ONDANSETRON 2 MG/ML
INJECTION INTRAMUSCULAR; INTRAVENOUS
Status: DISCONTINUED | OUTPATIENT
Start: 2017-11-20 | End: 2017-11-20

## 2017-11-20 RX ADMIN — ROCURONIUM BROMIDE 20 MG: 10 INJECTION INTRAVENOUS at 10:11

## 2017-11-20 RX ADMIN — PHENYLEPHRINE HYDROCHLORIDE 200 MCG: 10 INJECTION INTRAVENOUS at 12:11

## 2017-11-20 RX ADMIN — PHENYLEPHRINE HYDROCHLORIDE 300 MCG: 10 INJECTION INTRAVENOUS at 07:11

## 2017-11-20 RX ADMIN — ALPRAZOLAM 0.25 MG: 0.25 TABLET ORAL at 09:11

## 2017-11-20 RX ADMIN — PROPOFOL 50 MG: 10 INJECTION, EMULSION INTRAVENOUS at 12:11

## 2017-11-20 RX ADMIN — PHENYLEPHRINE HYDROCHLORIDE 200 MCG: 10 INJECTION INTRAVENOUS at 08:11

## 2017-11-20 RX ADMIN — SUCCINYLCHOLINE CHLORIDE 120 MG: 20 INJECTION, SOLUTION INTRAMUSCULAR; INTRAVENOUS at 07:11

## 2017-11-20 RX ADMIN — CARBOXYMETHYLCELLULOSE SODIUM 2 DROP: 2.5 SOLUTION/ DROPS OPHTHALMIC at 07:11

## 2017-11-20 RX ADMIN — HYDROCODONE BITARTRATE AND ACETAMINOPHEN 1 TABLET: 5; 325 TABLET ORAL at 05:11

## 2017-11-20 RX ADMIN — HETASTARCH IN SODIUM CHLORIDE: 6; .9 INJECTION, SOLUTION INTRAVENOUS at 12:11

## 2017-11-20 RX ADMIN — MIDAZOLAM HYDROCHLORIDE 2 MG: 1 INJECTION, SOLUTION INTRAMUSCULAR; INTRAVENOUS at 07:11

## 2017-11-20 RX ADMIN — PHENYLEPHRINE HYDROCHLORIDE 100 MCG: 10 INJECTION INTRAVENOUS at 11:11

## 2017-11-20 RX ADMIN — FENTANYL CITRATE 100 MCG: 50 INJECTION, SOLUTION INTRAMUSCULAR; INTRAVENOUS at 07:11

## 2017-11-20 RX ADMIN — BACITRACIN 50000 UNITS: 50000 INJECTION, POWDER, FOR SOLUTION INTRAMUSCULAR at 07:11

## 2017-11-20 RX ADMIN — HYDROMORPHONE HYDROCHLORIDE 0.4 MG: 2 INJECTION INTRAMUSCULAR; INTRAVENOUS; SUBCUTANEOUS at 03:11

## 2017-11-20 RX ADMIN — SODIUM CHLORIDE, SODIUM LACTATE, POTASSIUM CHLORIDE, AND CALCIUM CHLORIDE: 600; 310; 30; 20 INJECTION, SOLUTION INTRAVENOUS at 07:11

## 2017-11-20 RX ADMIN — CEFAZOLIN SODIUM 2 G: 2 SOLUTION INTRAVENOUS at 05:11

## 2017-11-20 RX ADMIN — CEFAZOLIN SODIUM 2 G: 2 SOLUTION INTRAVENOUS at 11:11

## 2017-11-20 RX ADMIN — PROPOFOL 150 MG: 10 INJECTION, EMULSION INTRAVENOUS at 09:11

## 2017-11-20 RX ADMIN — ISOSULFAN BLUE 3 ML: 10 INJECTION, SOLUTION SUBCUTANEOUS at 09:11

## 2017-11-20 RX ADMIN — ROCURONIUM BROMIDE 10 MG: 10 INJECTION INTRAVENOUS at 10:11

## 2017-11-20 RX ADMIN — PROPOFOL 150 MG: 10 INJECTION, EMULSION INTRAVENOUS at 07:11

## 2017-11-20 RX ADMIN — ROCURONIUM BROMIDE 10 MG: 10 INJECTION INTRAVENOUS at 08:11

## 2017-11-20 RX ADMIN — GENTAMICIN SULFATE 80 MG: 40 INJECTION, SOLUTION INTRAMUSCULAR; INTRAVENOUS at 07:11

## 2017-11-20 RX ADMIN — PHENYLEPHRINE HYDROCHLORIDE 200 MCG: 10 INJECTION INTRAVENOUS at 09:11

## 2017-11-20 RX ADMIN — PROPOFOL 50 MG: 10 INJECTION, EMULSION INTRAVENOUS at 08:11

## 2017-11-20 RX ADMIN — ONDANSETRON 8 MG: 8 TABLET, ORALLY DISINTEGRATING ORAL at 05:11

## 2017-11-20 RX ADMIN — CEFAZOLIN 1 G: 330 INJECTION, POWDER, FOR SOLUTION INTRAMUSCULAR; INTRAVENOUS at 07:11

## 2017-11-20 RX ADMIN — PHENYLEPHRINE HYDROCHLORIDE 200 MCG: 10 INJECTION INTRAVENOUS at 11:11

## 2017-11-20 RX ADMIN — LIDOCAINE HYDROCHLORIDE 75 MG: 20 INJECTION, SOLUTION INTRAVENOUS at 07:11

## 2017-11-20 RX ADMIN — GLYCOPYRROLATE 0.2 MG: 0.2 INJECTION, SOLUTION INTRAMUSCULAR; INTRAVENOUS at 07:11

## 2017-11-20 RX ADMIN — HYDROCODONE BITARTRATE AND ACETAMINOPHEN 1 TABLET: 10; 325 TABLET ORAL at 09:11

## 2017-11-20 RX ADMIN — PROPOFOL 100 MG: 10 INJECTION, EMULSION INTRAVENOUS at 01:11

## 2017-11-20 RX ADMIN — NEOSTIGMINE METHYLSULFATE 5 MG: 1 INJECTION INTRAVENOUS at 02:11

## 2017-11-20 RX ADMIN — GLYCOPYRROLATE 0.8 MG: 0.2 INJECTION, SOLUTION INTRAMUSCULAR; INTRAVENOUS at 02:11

## 2017-11-20 RX ADMIN — OXYCODONE HYDROCHLORIDE 5 MG: 5 TABLET ORAL at 03:11

## 2017-11-20 RX ADMIN — ONDANSETRON 4 MG: 2 INJECTION INTRAMUSCULAR; INTRAVENOUS at 07:11

## 2017-11-20 RX ADMIN — PROPOFOL 200 MG: 10 INJECTION, EMULSION INTRAVENOUS at 10:11

## 2017-11-20 RX ADMIN — ROCURONIUM BROMIDE 10 MG: 10 INJECTION INTRAVENOUS at 07:11

## 2017-11-20 RX ADMIN — INDOCYANINE GREEN 7.5 MG: KIT INTRAVENOUS at 01:11

## 2017-11-20 RX ADMIN — PHENYLEPHRINE HYDROCHLORIDE 100 MCG: 10 INJECTION INTRAVENOUS at 12:11

## 2017-11-20 RX ADMIN — CEFAZOLIN SODIUM 2 G: 2 SOLUTION INTRAVENOUS at 07:11

## 2017-11-20 RX ADMIN — FENTANYL CITRATE 100 MCG: 50 INJECTION, SOLUTION INTRAMUSCULAR; INTRAVENOUS at 12:11

## 2017-11-20 RX ADMIN — ACETAMINOPHEN 1000 MG: 10 INJECTION, SOLUTION INTRAVENOUS at 07:11

## 2017-11-20 RX ADMIN — PHENYLEPHRINE HYDROCHLORIDE 300 MCG: 10 INJECTION INTRAVENOUS at 10:11

## 2017-11-20 RX ADMIN — ROCURONIUM BROMIDE 10 MG: 10 INJECTION INTRAVENOUS at 09:11

## 2017-11-20 RX ADMIN — HEPARIN SODIUM 5000 UNITS: 5000 INJECTION, SOLUTION INTRAVENOUS; SUBCUTANEOUS at 05:11

## 2017-11-20 RX ADMIN — HYDROMORPHONE HYDROCHLORIDE 0.4 MG: 2 INJECTION INTRAMUSCULAR; INTRAVENOUS; SUBCUTANEOUS at 02:11

## 2017-11-20 RX ADMIN — DEXAMETHASONE SODIUM PHOSPHATE 8 MG: 4 INJECTION, SOLUTION INTRAMUSCULAR; INTRAVENOUS at 07:11

## 2017-11-20 NOTE — BRIEF OP NOTE
[facility]  Surgery Department  Operative Note    SUMMARY     Date of Procedure: 11/20/2017     Procedure: Procedure(s) (LRB):  RECONSTRUCTION-BREAST - bilateral - with tissue expanders (Bilateral)  MASTECTOMY (Bilateral)  BIOPSY-LYMPH NODE-SENTINEL (Left)     Surgeon(s) and Role:  Panel 1:     * Joshua Sebastian MD - Primary    Panel 2:     * Re Harman MD - Primary    Assisting Surgeon: None    Pre-Operative Diagnosis: History of breast cancer [Z85.3]    Post-Operative Diagnosis: Post-Op Diagnosis Codes:     * History of breast cancer [Z85.3]    Anesthesia: General    Technical Procedures Used: Prepectoral expander reconstruction bilaterally    Description of the Findings of the Procedure: Healthy skin flaps    Significant Surgical Tasks Conducted by the Assistant(s), if Applicable: None    Complications: No    Estimated Blood Loss (EBL): 50 mL           Implants:   Implant Name Type Inv. Item Serial No.  Lot No. LRB No. Used   [id number]   [id number] [employer] [id number] Right 1   Tissue Expander with suture tabs biocell moderate height extra protection with magna finder   [id number] [employer]  Right 1   [employer] DERMAL THICK [id number] - [id number]  [employer] DERMAL THICK [id number]  LIFECELL [id number] Left 1   TISSUE EXPANDER WITH SUTURE TABS BIOCELL MODERATE HEIGHT EXTRA PROJECTION WITH MAGNA FINDER     [id number] [employer]   Left 1       Specimens:   Specimen (12h ago through future)    Start     Ordered    11/20/17 1038  Specimen to Pathology - Surgery  Once     Comments:  1. Right breast-short superior, long lateral, loop sub-areolar2. Right nipple core3. Left axillary sentinel lymph node #1-hot & blue-count 1136-sent frozen4. Left axillary sentinel lymph node #2-hot & blue-count 297-sent frozen5. Left axillary sentinel lymph node #3-warm-count 30-sent frozen6. Left axillary sentinel lymph node #4-hot & blue-count 145-sent frozen7. Left axillary sentinel lymph node  #5-hot-count 220-sent frozen8. Left breast-short superior, long lateral, loop sub-areolar9. Left nipple core 10. Left breast-additional anterior margin (upper inner breast)      11/20/17 1041    11/20/17 1032  Specimen to Pathology - Surgery  Once     Comments:  1. Right breast-short superior, long lateral, loop sub-areolar2. Right nipple core3. Left axillary sentinel lymph node #1-hot & blue-count 1136-sent frozen4. Left axillary sentinel lymph node #2-hot & blue-count 297-sent frozen5. Left axillary sentinel lymph node #3-warm-count 30-sent frozen6. Left axillary sentinel lymph node #4-hot & blue-count 145-sent frozen7. Left axillary sentinel lymph node #5-hot-count 220-sent frozen8. Left breast-short superior, long lateral, loop sub-areolar9. Left nipple core      11/20/17 1033    11/20/17 0938  Specimen to Pathology - Surgery  Once     Comments:  3. Left axillary sentinel lymph node#1-hot & blue-count 1136-frozen4. Left axillary sentinel lymph node #2-hot & blue-count 297-frozen5. Left axillary sentinel lymph node #3-warm-count 30-frozen 6. Left axillary sentinel lymph node #4-hot & blue-count 45-frozen7. Left axillary sentinel lymph node #5-hot-count 220-frozen      11/20/17 0938                  Condition: Good    Disposition: PACU - hemodynamically stable.    Attestation: I was present and scrubbed for the entire procedure.

## 2017-11-20 NOTE — TRANSFER OF CARE
"Anesthesia Transfer of Care Note    Patient: Berto Chun    Procedure(s) Performed: Procedure(s) (LRB):  RECONSTRUCTION-BREAST - bilateral - with tissue expanders (Bilateral)  MASTECTOMY (Bilateral)  BIOPSY-LYMPH NODE-SENTINEL (Left)    Patient location: PACU    Anesthesia Type: general    Transport from OR: Transported from OR on 2-3 L/min O2 by NC with adequate spontaneous ventilation    Post pain: adequate analgesia    Post assessment: no apparent anesthetic complications    Post vital signs: stable    Level of consciousness: awake, alert and oriented    Nausea/Vomiting: no nausea/vomiting    Complications: none    Transfer of care protocol was followed      Last vitals:   Visit Vitals  /76 (BP Location: Left arm, Patient Position: Sitting)   Pulse 84   Temp 36.6 °C (97.8 °F) (Oral)   Resp 16   Ht 5' 3" (1.6 m)   Wt 74.8 kg (165 lb)   LMP 06/13/2017 (Within Weeks)   SpO2 97%   Breastfeeding? No   BMI 29.23 kg/m²     "

## 2017-11-20 NOTE — ANESTHESIA POSTPROCEDURE EVALUATION
"Anesthesia Post Evaluation    Patient: Berto Chun    Procedure(s) Performed: Procedure(s) (LRB):  RECONSTRUCTION-BREAST - bilateral - with tissue expanders (Bilateral)  MASTECTOMY (Bilateral)  BIOPSY-LYMPH NODE-SENTINEL (Left)    Final Anesthesia Type: general  Patient location during evaluation: PACU  Patient participation: Yes- Able to Participate  Level of consciousness: awake and alert  Post-procedure vital signs: reviewed and stable  Pain management: adequate  Airway patency: patent  PONV status at discharge: No PONV  Anesthetic complications: no      Cardiovascular status: blood pressure returned to baseline  Respiratory status: unassisted  Hydration status: euvolemic  Follow-up not needed.        Visit Vitals  BP (!) 117/56   Pulse 107   Temp 37.3 °C (99.1 °F) (Oral)   Resp 18   Ht 5' 3" (1.6 m)   Wt 74.8 kg (165 lb)   LMP 06/13/2017 (Within Weeks)   SpO2 99%   Breastfeeding? No   BMI 29.23 kg/m²       Pain/Nadja Score: Pain Assessment Performed: Yes (11/20/2017  2:34 PM)  Presence of Pain: complains of pain/discomfort (11/20/2017  2:50 PM)  Pain Rating Prior to Med Admin: 6 (11/20/2017  3:19 PM)  Nadja Score: 8 (11/20/2017  2:50 PM)      "

## 2017-11-20 NOTE — INTERVAL H&P NOTE
The patient has been examined and the H&P has been reviewed:    I concur with the findings and no changes have occurred since H&P was written.    Anesthesia/Surgery risks, benefits and alternative options discussed and understood by patient/family.          Active Hospital Problems    Diagnosis  POA    History of breast cancer [Z85.3]  Not Applicable      Resolved Hospital Problems    Diagnosis Date Resolved POA   No resolved problems to display.

## 2017-11-21 ENCOUNTER — TELEPHONE (OUTPATIENT)
Dept: SURGERY | Facility: CLINIC | Age: 27
End: 2017-11-21

## 2017-11-21 VITALS
TEMPERATURE: 98 F | OXYGEN SATURATION: 96 % | DIASTOLIC BLOOD PRESSURE: 58 MMHG | WEIGHT: 164.88 LBS | RESPIRATION RATE: 18 BRPM | HEIGHT: 63 IN | BODY MASS INDEX: 29.21 KG/M2 | HEART RATE: 81 BPM | SYSTOLIC BLOOD PRESSURE: 123 MMHG

## 2017-11-21 PROCEDURE — 11000001 HC ACUTE MED/SURG PRIVATE ROOM

## 2017-11-21 PROCEDURE — 25000003 PHARM REV CODE 250: Performed by: SURGERY

## 2017-11-21 PROCEDURE — 63600175 PHARM REV CODE 636 W HCPCS: Performed by: SURGERY

## 2017-11-21 PROCEDURE — 94799 UNLISTED PULMONARY SVC/PX: CPT

## 2017-11-21 RX ADMIN — HYDROCODONE BITARTRATE AND ACETAMINOPHEN 1 TABLET: 10; 325 TABLET ORAL at 02:11

## 2017-11-21 RX ADMIN — HYDROCODONE BITARTRATE AND ACETAMINOPHEN 1 TABLET: 10; 325 TABLET ORAL at 09:11

## 2017-11-21 RX ADMIN — HYDROMORPHONE HYDROCHLORIDE 1 MG: 1 INJECTION, SOLUTION INTRAMUSCULAR; INTRAVENOUS; SUBCUTANEOUS at 02:11

## 2017-11-21 RX ADMIN — CEFAZOLIN SODIUM 2 G: 2 SOLUTION INTRAVENOUS at 05:11

## 2017-11-21 RX ADMIN — HYDROCODONE BITARTRATE AND ACETAMINOPHEN 1 TABLET: 10; 325 TABLET ORAL at 01:11

## 2017-11-21 RX ADMIN — STANDARDIZED SENNA CONCENTRATE AND DOCUSATE SODIUM 1 TABLET: 8.6; 5 TABLET, FILM COATED ORAL at 09:11

## 2017-11-21 RX ADMIN — CEFAZOLIN SODIUM 2 G: 2 SOLUTION INTRAVENOUS at 01:11

## 2017-11-21 NOTE — DISCHARGE SUMMARY
[facility]  Discharge Summary     Patient ID:  Berto Chun  [id number]  27 y.o.  1990    Admit date: 11/20/2017    Discharge Date and Time:  11/21/2017 7:47 AM    Admitting Physician: Joshua Sebastian MD     Discharge Provider: Joshua Sebastian    Reason for Admission: History of breast cancer [Z85.3]  History of breast cancer [Z85.3]  History of breast cancer [Z85.3]    Admission Condition: good    Procedures Performed: Procedure(s) (LRB):  RECONSTRUCTION-BREAST - bilateral - with tissue expanders (Bilateral)  MASTECTOMY (Bilateral)  BIOPSY-LYMPH NODE-SENTINEL (Left)    Hospital Course Underwent the above procedures without complications.      Consults: None    Significant Diagnostic Studies: None    Final Diagnoses:    Principal Problem: History of breast cancer   Secondary Diagnoses: Acquired absence of bilateral breasts    Discharged Condition: good    Discharge Exam:  Breasts with good perfusion, no hematoma    Disposition: Home or Self Care    Follow Up/Patient Instructions:     FU in 1 week, may shower, No heavy lifting    Medications:  Reconciled Home Medications:   Current Discharge Medication List      CONTINUE these medications which have NOT CHANGED    Details   alprazolam (XANAX) 0.25 MG tablet TAKE 1 TO 2 TABLETS BY MOUTH EVERY NIGHT AT BEDTIME AS NEEDED FOR INSOMNIA  Qty: 60 tablet, Refills: 0    Associated Diagnoses: Anxiety      biotin 1 mg Cap Take by mouth.      bisacodyl (DULCOLAX) 5 mg EC tablet Take 5 mg by mouth daily as needed for Constipation.      desvenlafaxine 50 mg Tb24 Take 1 tablet by mouth once daily.       PNV#75/IRON FUM/FA/OM3/DHA/EPA (ONE A DAY WOMEN'S PRENATAL DHA ORAL) Take 1 tablet by mouth once daily.      prochlorperazine (COMPAZINE) 10 MG tablet TK 1 T PO Q 6 H PRF NV  Refills: 6      clindamycin-tretinoin (ZIANA) gel Apply a thin coat to the affected area every night.      hydrocodone-acetaminophen 5-325mg (NORCO) 5-325 mg per tablet Take 1  tablet by mouth every 4 (four) hours as needed for Pain.  Qty: 15 tablet, Refills: 0      water Liqd 150 mL with MILK OF MAGNESIA 400 mg/5 mL Susp 400 mg, diphenhydrAMINE 12.5 mg/5 mL Elix 60 mg, nystatin 100,000 unit/mL Susp 500,000 Units SWISH AND SWALLOW 5ML PO Q 4 H PRF MOUTH/THROAT PAIN  Refills: 6         STOP taking these medications       NAPROXEN SOD/DIPHENHYDRAM HCL (ALEVE PM ORAL) Comments:   Reason for Stopping:               Discharge Procedure Orders  Diet general     Lifting restrictions     Call MD for:  temperature >100.4     Call MD for:  persistent nausea and vomiting or diarrhea     Call MD for:  severe uncontrolled pain     Call MD for:  redness, tenderness, or signs of infection (pain, swelling, redness, odor or green/yellow discharge around incision site)     Call MD for:  difficulty breathing or increased cough     Call MD for:  severe persistent headache     Call MD for:  worsening rash     Call MD for:  persistent dizziness, light-headedness, or visual disturbances     Call MD for:  increased confusion or weakness     No dressing needed       Follow-up Information     Joshua Sebastian MD In 1 week.    Specialty:  Plastic Surgery  Why:  For post-op visit  Contact information:  [address]  [address]  [employer]  [facility] [id number] [phone]                 Activity: no heavy lifting for 4 weeks  Diet: regular diet  Wound Care: keep wound clean and dry    Follow-up with Dr. Sebastian in 1 week   Signed:  Joshua Sebastian  11/21/2017  7:47 AM

## 2017-11-21 NOTE — TELEPHONE ENCOUNTER
Received call from [facility], nurse states patient has a note on her chart that she should not be d/c until Dr. Harman has rounded on her. Explained that Dr. Harman is in a procedure right now, then has clinic at [facility] until 3:30. Told Jack I would pass the message along to her nurse Sherri.

## 2017-11-21 NOTE — NURSING
Prepared for discharge. Discharge teaching done. LUZ care demonstrated, patient returned demo. Verbalized understanding. PIV d/c'd with cannula intact. Remains in room awaiting visit from Dr. Harman.

## 2017-11-21 NOTE — PLAN OF CARE
Problem: Patient Care Overview  Goal: Plan of Care Review  Outcome: Ongoing (interventions implemented as appropriate)  Pt on RA sat's 93% with no distress IS done with good effort.

## 2017-11-21 NOTE — PLAN OF CARE
Problem: Patient Care Overview  Goal: Plan of Care Review  Outcome: Ongoing (interventions implemented as appropriate)  IS instructed, now self-administering.

## 2017-11-21 NOTE — NURSING
Patient received from PACU AAOx4, AVSS, NAD. Bilateral breasts with skin sparing mastectomy incisions at breast folds with edges well approximated, skin adhesive intact, ABD pads and soft surgical bra in place. Due to void. Plan of care reviewed with patient and family. Oriented to room, nurse call system, safety measures, dietary. Will monitor.

## 2017-11-21 NOTE — OP NOTE
Operative Note     11/20/2017    PRE-OP DIAGNOSIS: History of breast cancer [Z85.3]      POST-OP DIAGNOSIS: Post-Op Diagnosis Codes:     * History of breast cancer [Z85.3]    Procedure(s):  RECONSTRUCTION-BREAST - bilateral - with tissue expanders  MASTECTOMY  BIOPSY-LYMPH NODE-SENTINEL     SURGEON: Surgeon(s) and Role:  Panel 1:     * Joshua Sebastian MD - Primary    Panel 2:     * Re Harman MD - Primary    ANESTHESIA: General     OPERATIVE FINDINGS: 5 SLN, all negative on frozen section. Additional tissue take UIQ over area of cancer    INDICATION FOR PROCEDURE: This patient presents with a history of cancer of the left breast    PROCEDURE IN DETAIL:  Berto Chun is a 27 y.o. female brought to the operating room for definitive surgery of cancer of the left breast.  The patient has elected to undergo bilateral nipple sparing mastectomy with sentinel lymph node biopsy for saritha assessment. The patient was informed of the possible risks and complications of the procedure, including but not limited to anesthetic risks, bleeding, infection, and need for additional surgery.  The patient concurred with the proposed plan, and has given informed consent.  The site of surgery was properly noted/marked in the preoperative holding area.    Prior to arriving in the operating room, the patient's left breast was injected with technetium to facilitate sentinel lymph node identification. The patient was then brought to the operating room and placed in the supine position with both upper extremities extended.  General anesthesia was administered. Perioperative antibiotics were administered consisting of Ancef and a time out was performed confirming the patient, site, and procedure.  The bilateral chest and axilla was then prepped and draped in the usual sterile fashion.    We first turned our attention to the right prophylactic breast where an inframammary incision was made, sparing the nipple areolar complex.  The  incision was made with a plasmablade and deepened through the subcutaneous tissues with plasmablade.  Skin flaps were raised to the clavicle superiorly, to the lateral border of the sternum medially, to the inframammary fold inferiorly, and to the anterior border of the latissimus dorsi muscle laterally. Lighted retractors was used to assist in the creation of the skin flaps. The tissue beneath the nipple areolar complex was sharply dissected being careful to dissect to the dermis.  We were careful to ensure that the ductal tissue beneath the nipple was removed.  The breast tissue was then sharply excised off the chest wall taking care to incorporate the pectoralis fascia while leaving the serratus fascia behind.  The resulting mastectomy specimen was marked using a short stitch superiorly and a long stitch laterally.  The breast was sent to pathology for permanent evaluation.   The operative field was irrigated with normal saline and all bleeding points were secured with Bovie electrocautery.   The incision will be closed by the plastic surgery service.      We then turned our attention to the left axilla.   Blue dye was injected prior to the incision in the usual subareolar fashion. The gamma probe was used to identify an area of increased radioactivity within the lower axilla.  A small incision was made in a transverse inferior fashion just beneath the hairline in the axilla. The clavipectoral sheath was sharply incised to reveal the level I axillary lymph nodes. The probe was used to identify a single node with increased radioactivity.  This node was brought into the operative field and carefully dissected free of the surrounding lymphovascular structures.  The highest ex vivo count of the node was 1136.  The node was then sent to pathology for frozen section evaluation, labeled as sentinel node #1.  A total of 5 axillary sentinel nodes and 0 axillary non-sentinel nodes were identified, excised and submitted to  pathology.  Bed counts were obtained to confirm that the 10% rule had not been violated.   The wound was irrigated with normal saline, and all bleeding points were secured with Bovie electrocautery.  The incision was closed with an interrupted 3-0 vicryl deep dermal stitch followed by a running 4-0 monocryl subcuticular.     We then turned our attention back to the left breast where an inframammary incision was fashioned sparing the nipple areolar complex.  The incision was made with a plasmablade and extended through the subcutaneous tissues with plasmablade.  Skin flaps were raised to the clavicle superiorly.  We then proceeded to raise the remainder of the flaps to the lateral border of the sternum medially, to the inframammary fold inferiorly, and to the anterior border of the latissimus dorsi muscle laterally.  Lighted retractors was used to assist in the creation of the skin flaps. The tissue beneath the nipple areolar complex was sharply dissected being careful to dissect to the dermis.  We were careful to ensure that the ductal tissue beneath the nipple was removed. The breast tissue was sharply excised off the chest wall taking care to incorporate the pectoralis fascia while leaving the serratus fascia behind.  The resulting mastectomy specimen was marked using a short stitch superiorly and long stitch laterally.  The breast was sent to pathology for permanent evaluation.  Additional margin was taken from upper inner quadrant of the left breast where the tumor biopsy changes were felt to be close.    Frozen section saritha evaluation revealed no evidence of metastatic disease.  Therefore, the operative field was irrigated with normal saline and all bleeding points were secured with Bovie electrocautery.  The incision will be closed by the plastic surgery service.      At the end of my portion of the operation, all sponge, instrument, and needle counts x 2 were correct.    ESTIMATED BLOOD LOSS: less than 50  mL    SPECIMENS:   Specimens     Start     Ordered    11/20/17 1038  Specimen to Pathology - Surgery  Once      11/20/17 1041    11/20/17 1032  Specimen to Pathology - Surgery  Once      11/20/17 1033    11/20/17 0938  Specimen to Pathology - Surgery  Once      11/20/17 0938           COMPLICATIONS: none    DISPOSITION: intraop transfer to the plastic surgery service    ATTESTATION:   I was present and scrubbed for the entire procedure.

## 2017-11-21 NOTE — PLAN OF CARE
Problem: Patient Care Overview  Goal: Plan of Care Review  Outcome: Ongoing (interventions implemented as appropriate)  Plan of care reviewed with pt. NAD noted at this time. Denies any cp or sob. Resp even and unlabored. Incentive spirometer encouraged while awake. VSS, afebrile. Denies N/V. AAO x 4. PPP michele. SCD's remain in place. Pain controlled with current pain meds. LUZ drains x 4. Intact and draining serosanguinous fluid. Up with minimal assist within room and to bathroom. Remains free from injury. Safety precautions remain in place. Call light in reach. Will continue to monitor.

## 2017-11-21 NOTE — PROGRESS NOTES
"POD 1    Doing well.    Blood pressure (!) 118/57, pulse 90, temperature 98.5 °F (36.9 °C), temperature source Oral, resp. rate 18, height 5' 3" (1.6 m), weight 74.8 kg (164 lb 14.5 oz), last menstrual period 06/13/2017, SpO2 95 %, not currently breastfeeding.      Breasts with good perfusion, no hematoma, drains in place    A/P s/p bilateral breast reconstruction with Tissue Expanders    1.) D/C today after seen by Dr. Harman  "

## 2017-11-21 NOTE — OP NOTE
DATE OF PROCEDURE:  11/20/2017.    PREOPERATIVE DIAGNOSES:  1.  Left breast cancer.  2.  Acquired absence of bilateral breasts.    POSTOPERATIVE DIAGNOSES:  1.  Left breast cancer.  2.  Acquired absence of bilateral breasts.    OPERATIVE PROCEDURE:  1.  Immediate bilateral breast reconstruction with prepectoral tissue expanders   and acellular dermal matrix.  2.  SPY visualization of the mastectomy skin flaps.    SURGEON:  Joshua Sebastian M.D.    ANESTHESIA:  General.    ESTIMATED BLOOD LOSS:  50 mL    INTRAVASCULAR FLUIDS:  Per Anesthesia.    SPECIMENS:  None.    DRAINS:  Two 15 round drains to each breast.    COMPLICATIONS:  None.    POSTOPERATIVE CONDITION:  Stable.    BRIEF INDICATIONS FOR SURGERY:  Ms. Chun is a 27-year-old female who underwent   neoadjuvant chemotherapy and will undergo bilateral nipple-sparing mastectomies.    The patient is high-risk for needing adjuvant radiation therapy; therefore, we   elected to proceed with bilateral tissue expander reconstruction in the   prepectoral position.  Risks and benefits were discussed.  The patient agreed to   proceed.    OPERATIVE PROCEDURE IN DETAIL:  After risks and benefits were thoroughly   discussed with the patient, the patient expressed verbal understanding, informed   consent was obtained.  The patient was subsequently taken to the operating   room, placed supine on the operating room table.  After appropriate general   anesthesia was provided, the patient's breasts were prepped and draped in   standard surgical fashion.  Dr. Morris performed the nipple-sparing mastectomies   via the IMF approach.  Once she was finished, the breasts were reprepped and   draped in a standard surgical fashion.  Attention was directed towards the right   breast.  Hemostasis was obtained to the bilateral breast pockets and was   irrigated with bacitracin solution.  The lateral breast pocket and the   inframammary fold was reinforced with 2-0 PDS bilaterally.  Next, the  breast   footprint was then measured and marked and we elected to use a style 133 [id number]   Allergan expander.  The height and width, the width of 12 cm and the height 11   cm was then marked with a marking pen.  Next, the AlloDerm thick 16 x 20 piece   was then cut to the desired height and width.  Next, the AlloDerm was then sewn   in starting at the superior portion with 2-0 PDS suture.  The medial portion was   then run down to the IMF as well as the lateral portion down to the IMF.  Once   I was happy with the medial and lateral edges of the ADM, the pocket was then   irrigated with triple antibiotic solution.  Hemostasis was obtained and surgical   gloves were changed.  Tissue expander was then deaired and was then placed into   the AlloDerm pocket.  The medial and lateral suture sites were then secured   with 2-0 Prolene suture.  Next, the remaining AlloDerm was then tucked under the   expander to create a cuff.  The AlloDerm was then reinforced with IMF with 2-0   PDS horizontal mattress sutures.  Next, the expander was then inflated to 240 mL   of sterile saline.  This was done on the opposite side in the same fashion.    Next, the skin was then tailor-tack with staples and despite visualization of   the breast was performed, there was shown to be good perfusion to the mastectomy   skin flaps and nipple areolar complex.  Next, two 15 round drains were then   placed, one superior and one inferiorly and secured with 4-0 Vicryl suture.  The   wound was then closed with 3-0 Monocryl deep dermal sutures as well as 2-0   Quill subcuticular stitch.  Dermabond tape was then applied followed by   Biopatches to the drains with Tegaderms.  Postoperative bra was then placed.    The sponge, needle and instrument counts were correct at the end of the case.    The patient tolerated the procedure well and was transferred to recovery room in   stable condition.      LUCERO/TONI  dd: 11/20/2017 15:03:38 (CST)  td: 11/20/2017  22:53:02 ([facility])  Doc ID   #[id number]  Job ID #[id number]    CC:

## 2017-11-29 NOTE — PHYSICIAN QUERY
PT Name: Berto Chun  MR #: [id number]    Physician Query Form - Pathology Findings Clarification     CDS/: Brandy E Capley               Contact information: Spectralink: [phone]  This form is a permanent document in the medical record.     Query Date: November 29, 2017      By submitting this query, we are merely seeking further clarification of documentation.  Please utilize your independent clinical judgment when addressing the question(s) below.          The medical record contains the following:     Findings Supporting Clinical Information Location in Medical Record   SPECIMEN  1) Right breast  2) Right nipple core  3) Left axillary sentinel lymph node #1 (1136)  4) Left axillary sentinel lymph node #2 (297)  5) Left axillary sentinel lymph node #3 (30)  6) Left axillary sentinel lymph node #4 (145)  7) Left axillary sentinel lymph node #5 (220)  8) Left breast  9) Left nipple core  10) Left breast-additional anterior margin    FINAL PATHOLOGIC DIAGNOSIS  1. BREAST (RIGHT SKIN SPARING MASTECTOMY): FIBROCYSTIC CHANGES; NO EVIDENCE OF  MALIGNANCY.  2. BREAST (RIGHT NIPPLE, CLINICAL): NO EVIDENCE OF MALIGNANCY.  Note: Ribbon sections of the specimen were examined microscopically at deeper levels.  3. LYMPH NODE (LEFT SENTINEL LYMPH NODE, CLINICAL): METASTATIC CARCINOMA (pN1mi).  Note: Several intravascular nests of tumor are present within the lymph node which were not detected on lymph  node frozen section. The largest tumor nodule measures 0.3mm. All lymph node tissue was examined at three  levels with routine (H&E) stains and with three epithelial immunostains (AE1/AE3, Cam5.2, WSK). Positive and  negative controls reacted appropriately.  4. LYMPH NODE (LEFT SENTINEL LYMPH NODE, CLINICAL): NO EVIDENCE OF MALIGNANCY.  Note: All lymph node tissue was examined at three levels with routine (H&E) stains and with three epithelial  immunostains (AE1/AE3, Cam5.2, WSK). Positive and negative controls reacted  appropriately.  5. LYMPH NODE (LEFT SENTINEL LYMPH NODE, CLINICAL): NO EVIDENCE OF MALIGNANCY.  Note: All lymph node tissue was examined at three levels with routine (H&E) stains and with three epithelial  immunostains (AE1/AE3, Cam5.2, WSK). Positive and negative controls reacted appropriately.  6. LYMPH NODE (LEFT SENTINEL LYMPH NODE, CLINICAL): NO EVIDENCE OF MALIGNANCY.  Note: All lymph node tissue was examined at three levels with routine (H&E) stains and with three epithelial  immunostains (AE1/AE3, Cam5.2, WSK). Positive and negative controls reacted appropriately.  7. LYMPH NODE (LEFT SENTINEL LYMPH NODE, CLINICAL): NO EVIDENCE OF MALIGNANCY.  Note: All lymph node tissue was examined at three levels with routine (H&E) stains and with three epithelial  immunostains (AE1/AE3, Cam5.2, WSK). Positive and negative controls reacted appropriately.  8. BREAST (LEFT SKIN SPARING MASTECTOMY): INVASIVE DUCTAL CARCINOMA (1.4 CM) SURROUNDED  BY HIGH-GRADE DUCTAL CARCINOMA IN SITU (1.5 CM); ANTERIOR AND POSTERIOR RESECTION  MARGINS FREE OF MALIGNANCY.  Note: Hormone receptor assays are being performed and results will be issued as a Supplemental report.  9. BREAST (LEFT NIPPLE, CLINICAL): NO EVIDENCE OF MALIGNANCY.  Note: Ribbon sections of the specimen were examined microscopically at deeper levels.  10. BREAST AND ADIPOSE TISSUE (ANTERIOR MARGIN, CLINICAL): NO EVIDENCE OF MALIGNANCY.    SURGICAL PATHOLOGY CANCER CASE SUMMARY  INVASIVE CARCINOMA OF THE BREAST  Procedure: total mastectomy  Lymph nodes sampling: sentinel lymph nodes  Tumor site: upper inner quadrant  Histologic type: invasive ductal carcinoma  Tumor size: 1.4 cm  Histologic grade: intermediate grade (3, 2, 1)  Tumor focality: single focus  DCIS: present, high-grade DCIS (1.5cm)  LCIS: absent  Macroscopic and microscopic extent of tumor: na  Margins: uninvolved  Distance from closest margin: anterior; 0.9 cm from DCIS; 1.5 cm from invasive tumor  Lymph  nodes:  Number of sentinel lymph nodes examined: 5  Total lymph nodes examined: 5  Number of lymph nodes with macrometastases (>2mm): 0  Number of lymph nodes with micrometastases (>.2mm 2mm): 1  Number of lymph nodes with isolated tumor cells (</=.2mm): 0  Number of lymph nodes without tumor cells identified: 4  Size of largest metastasis: 3mm  Treatment effect: probable response to presurgical therapy in the invasive carcinoma  Stage: IB [pT1c pN1mi]  Diagnosed by: Petros Wasserman M.D.  (Electronically Signed: 2017-11-28 10:13:18)  Surgical pathology 11/20     Please document the clinical significance of the Pathologists findings of _ METASTATIC CARCINOMA of left axillary lymph node______.          [  ] I agree with the Pathology Findings        [  ] I do not agree with the Pathology Findings        [  ] Clinically Undetermined        [  ] Other/Clarification of Findings: ___Please send to Dr. Harman___________________________________________    Please document in your progress notes daily for the duration of treatment until resolved and include in your discharge summary.

## 2017-11-29 NOTE — PHYSICIAN QUERY
PT Name: Berto Chun  MR #: [id number]    Physician Query Form - Pathology Findings Clarification     CDS/: Brandy E Capley               Contact information: Spectralink: [phone]  This form is a permanent document in the medical record.     Query Date: November 29, 2017      By submitting this query, we are merely seeking further clarification of documentation.  Please utilize your independent clinical judgment when addressing the question(s) below.      The medical record contains the following:     Findings Supporting Clinical Information Location in Medical Record   SPECIMEN  1) Right breast  2) Right nipple core  3) Left axillary sentinel lymph node #1 (1136)  4) Left axillary sentinel lymph node #2 (297)  5) Left axillary sentinel lymph node #3 (30)  6) Left axillary sentinel lymph node #4 (145)  7) Left axillary sentinel lymph node #5 (220)  8) Left breast  9) Left nipple core  10) Left breast-additional anterior margin    FINAL PATHOLOGIC DIAGNOSIS  1. BREAST (RIGHT SKIN SPARING MASTECTOMY): FIBROCYSTIC CHANGES; NO EVIDENCE OF  MALIGNANCY.  2. BREAST (RIGHT NIPPLE, CLINICAL): NO EVIDENCE OF MALIGNANCY.  Note: Ribbon sections of the specimen were examined microscopically at deeper levels.  3. LYMPH NODE (LEFT SENTINEL LYMPH NODE, CLINICAL): METASTATIC CARCINOMA (pN1mi).  Note: Several intravascular nests of tumor are present within the lymph node which were not detected on lymph  node frozen section. The largest tumor nodule measures 0.3mm. All lymph node tissue was examined at three  levels with routine (H&E) stains and with three epithelial immunostains (AE1/AE3, Cam5.2, WSK). Positive and  negative controls reacted appropriately.  4. LYMPH NODE (LEFT SENTINEL LYMPH NODE, CLINICAL): NO EVIDENCE OF MALIGNANCY.  Note: All lymph node tissue was examined at three levels with routine (H&E) stains and with three epithelial  immunostains (AE1/AE3, Cam5.2, WSK). Positive and negative controls reacted  appropriately.  5. LYMPH NODE (LEFT SENTINEL LYMPH NODE, CLINICAL): NO EVIDENCE OF MALIGNANCY.  Note: All lymph node tissue was examined at three levels with routine (H&E) stains and with three epithelial  immunostains (AE1/AE3, Cam5.2, WSK). Positive and negative controls reacted appropriately.  6. LYMPH NODE (LEFT SENTINEL LYMPH NODE, CLINICAL): NO EVIDENCE OF MALIGNANCY.  Note: All lymph node tissue was examined at three levels with routine (H&E) stains and with three epithelial  immunostains (AE1/AE3, Cam5.2, WSK). Positive and negative controls reacted appropriately.  7. LYMPH NODE (LEFT SENTINEL LYMPH NODE, CLINICAL): NO EVIDENCE OF MALIGNANCY.  Note: All lymph node tissue was examined at three levels with routine (H&E) stains and with three epithelial  immunostains (AE1/AE3, Cam5.2, WSK). Positive and negative controls reacted appropriately.  8. BREAST (LEFT SKIN SPARING MASTECTOMY): INVASIVE DUCTAL CARCINOMA (1.4 CM) SURROUNDED  BY HIGH-GRADE DUCTAL CARCINOMA IN SITU (1.5 CM); ANTERIOR AND POSTERIOR RESECTION  MARGINS FREE OF MALIGNANCY.  Note: Hormone receptor assays are being performed and results will be issued as a Supplemental report.  9. BREAST (LEFT NIPPLE, CLINICAL): NO EVIDENCE OF MALIGNANCY.  Note: Ribbon sections of the specimen were examined microscopically at deeper levels.  10. BREAST AND ADIPOSE TISSUE (ANTERIOR MARGIN, CLINICAL): NO EVIDENCE OF MALIGNANCY.    SURGICAL PATHOLOGY CANCER CASE SUMMARY  INVASIVE CARCINOMA OF THE BREAST  Procedure: total mastectomy  Lymph nodes sampling: sentinel lymph nodes  Tumor site: upper inner quadrant  Histologic type: invasive ductal carcinoma  Tumor size: 1.4 cm  Histologic grade: intermediate grade (3, 2, 1)  Tumor focality: single focus  DCIS: present, high-grade DCIS (1.5cm)  LCIS: absent  Macroscopic and microscopic extent of tumor: na  Margins: uninvolved  Distance from closest margin: anterior; 0.9 cm from DCIS; 1.5 cm from invasive tumor  Lymph  nodes:  Number of sentinel lymph nodes examined: 5  Total lymph nodes examined: 5  Number of lymph nodes with macrometastases (>2mm): 0  Number of lymph nodes with micrometastases (>.2mm 2mm): 1  Number of lymph nodes with isolated tumor cells (</=.2mm): 0  Number of lymph nodes without tumor cells identified: 4  Size of largest metastasis: 3mm  Treatment effect: probable response to presurgical therapy in the invasive carcinoma  Stage: IB [pT1c pN1mi]  Diagnosed by: Petros Wasserman M.D.  (Electronically Signed: 2017-11-28 10:13:18)  Surgical pathology 11/20     Please document the clinical significance of the Pathologists findings of _ INVASIVE DUCTAL CARCINOMA SURROUNDED BY HIGH-GRADE DUCTAL CARCINOMA IN SITU of upper inner quadrant of left breast______.          [  ] I agree with the Pathology Findings        [  ] I do not agree with the Pathology Findings        [  ] Clinically Undetermined        [  ] Other/Clarification of Findings: ____________Please send to Dr. Harman__________________________________    Please document in your progress notes daily for the duration of treatment until resolved and include in your discharge summary.

## 2017-12-04 ENCOUNTER — PATIENT MESSAGE (OUTPATIENT)
Dept: SURGERY | Facility: CLINIC | Age: 27
End: 2017-12-04

## 2017-12-05 ENCOUNTER — TUMOR BOARD CONFERENCE (OUTPATIENT)
Dept: SURGERY | Facility: CLINIC | Age: 27
End: 2017-12-05

## 2017-12-05 ENCOUNTER — OFFICE VISIT (OUTPATIENT)
Dept: SURGERY | Facility: CLINIC | Age: 27
End: 2017-12-05
Attending: SURGERY
Payer: COMMERCIAL

## 2017-12-05 VITALS
SYSTOLIC BLOOD PRESSURE: 121 MMHG | HEART RATE: 94 BPM | BODY MASS INDEX: 29.1 KG/M2 | HEIGHT: 63 IN | WEIGHT: 164.25 LBS | DIASTOLIC BLOOD PRESSURE: 73 MMHG

## 2017-12-05 DIAGNOSIS — C50.912 MALIGNANT NEOPLASM OF LEFT FEMALE BREAST, UNSPECIFIED ESTROGEN RECEPTOR STATUS, UNSPECIFIED SITE OF BREAST: Primary | ICD-10-CM

## 2017-12-05 PROCEDURE — 99024 POSTOP FOLLOW-UP VISIT: CPT | Mod: S$GLB,,, | Performed by: SURGERY

## 2017-12-05 RX ORDER — DOCUSATE SODIUM 100 MG/1
CAPSULE, LIQUID FILLED ORAL
Refills: 0 | COMMUNITY
Start: 2017-11-16 | End: 2018-02-28

## 2017-12-05 RX ORDER — ONDANSETRON 8 MG/1
TABLET, ORALLY DISINTEGRATING ORAL
COMMUNITY
Start: 2017-11-16 | End: 2022-07-20

## 2017-12-05 NOTE — PROGRESS NOTES
Interdisciplinary Breast Cancer Conference    Berto Chun    Female    Date Presented to Tumor Board: 12/05/17    HOSPTIAL/CLINIC PRESENTING: [facility] [address]    TUMOR SITE: LEFT    TUMOR SITE: POSTERIOR (10:00 2cm FN)    Presenter: Musa Galdamez, Kai Marcus (on behalf of Re Harman MD)    Reason For Consultation: Initial Presentation    Specialties Present: Medical Oncology;Radiation Oncology;Surgical Oncology;Palliative Care;Research;Navigation;Pathology;Radiology    Patient Status: a current patient    Treatment to Date: Neoadjuvant Chemotherapy;Surgical Intervention(s) (bilateral mastectomy)    Clinical Trial Eligibility: Potentially eligible (PALLAS trial)    Estrogen Receptor Status: Positive    Progesterone Status: Positive    Her2/MAYA Status: Negative    Clinically [id number]  Post-chemo surgical path: [id number], 1 positive sentinel lymph node (mostly in vascular spaces) and 4 negative sentinel lymph nodes    RECOMMENDED PLAN: Endocrine Therapy   Noted that outside lymph node biopsies do not put a marker in the node, which makes it hard to determine that the node that was originally positive is removed. In this case, it is likely that we got the correct node since it is still positive. However, in the operating room the node was called in negative, so no dissection was done.     Offered patient the [place] trial to avoid dissection, but since patient lives on the [facility] she refused. She will need radiation to the axilla regardless, but with her age it is strongly recommended that she receive both a dissection and radiation.     Ovarian suppression with LHRH agonist and AI are also recommended. Could do PALLAS trial with palbociblib added to regimen, but cannot do this unless she sees an oncologist on [facility].     UNSTAGEABLE: No         PRESENTATION AT CANCER CONFERENCE: [facility]

## 2017-12-06 NOTE — PROGRESS NOTES
REFERRING PHYSICIAN:  No referring provider defined for this encounter.       Brenda Jaimes MD    MEDICAL ONCOLOGIST:    None  RADIATION ONCOLOGIST:   None    DIAGNOSIS:    This is a 27 y.o. female with a stage IIB pT2 N1 M0 grade 2 ER + [place] + HER2 negative of the left breast.    TREATMENT SUMMARY:  The patient is status post bilateral mastectomies and sentinel node biopsy on the left.  Final pathology showed invasive cancer 1.4cm with surrounding DCIS of 1.5cm and one lymph node with micrometastasis (0.3mm) on the left side. There were 4 additional lymph nodes negative for cancer. She was treated with 4 cycles of neoadjuvant Taxotere and four cycles of adjuvant Adriamycin/Cytoxan. Repeat PET scan now showing resolution of breast mass and decreased size in left axillary node prior to having surgery.    INTERVAL HISTORY:   Berto Chun comes in for a post-op check.  She denies fever, chills, chest pain or shortness of breath.  Her pain is well controlled.  She has been seen by Dr. Oliveros and the plan is to follow up in 1-2 weeks pending if she will need adjuvant radiation therapy.    MEDICATIONS:  Current Outpatient Prescriptions   Medication Sig Dispense Refill    alprazolam (XANAX) 0.25 MG tablet TAKE 1 TO 2 TABLETS BY MOUTH EVERY NIGHT AT BEDTIME AS NEEDED FOR INSOMNIA 60 tablet 0    biotin 1 mg Cap Take by mouth.      bisacodyl (DULCOLAX) 5 mg EC tablet Take 5 mg by mouth daily as needed for Constipation.      desvenlafaxine 50 mg Tb24 Take 1 tablet by mouth once daily.       ondansetron (ZOFRAN-ODT) 8 MG TbDL       PNV#75/IRON FUM/FA/OM3/DHA/EPA (ONE A DAY WOMEN'S PRENATAL DHA ORAL) Take 1 tablet by mouth once daily.      prochlorperazine (COMPAZINE) 10 MG tablet TK 1 T PO Q 6 H PRF NV  6    water Liqd 150 mL with MILK OF MAGNESIA 400 mg/5 mL Susp 400 mg, diphenhydrAMINE 12.5 mg/5 mL Elix 60 mg, nystatin 100,000 unit/mL Susp 500,000 Units SWISH AND SWALLOW 5ML PO Q 4 H PRF MOUTH/THROAT PAIN  6     clindamycin-tretinoin (ZIANA) gel Apply a thin coat to the affected area every night.       mg capsule TK 1 C PO D  0    hydrocodone-acetaminophen 5-325mg (NORCO) 5-325 mg per tablet Take 1 tablet by mouth every 4 (four) hours as needed for Pain. 15 tablet 0     No current facility-administered medications for this visit.        ALLERGIES:   Review of patient's allergies indicates:  No Known Allergies    PHYSICAL EXAMINATION:   General:  This is a well appearing female with appropriate speech, affect and gait.     Breast:  Both breasts' incisions are clean, dry, and intact.    IMPRESSION:   The patient has had an uneventful postoperative course.    PLAN:   1. Long discuss with patient and her  in regards to adjuvant therapy considering she had micrometastasis in her lymph nodes. We stated that the current recommendations for patients with who had positive lymph nodes prior to neoadjuvant chemotherapy who then had residual cancer after surgery is for completion axillary dissection and radiation therapy. We discussed the risks of developing lymphedema being 30-40% after both treatments. She prefers to have radiation therapy alone.  2. She will return in 2 weeks for a follow up exam  3. The patient is advised in continued exam of the breast chest wall and to report to this office sooner should she note any areas of abnormality or concern.   4.  She has been instructed to meet with med onc and rad onc for discussion of adjuvant treatment recommendations

## 2017-12-07 NOTE — PHYSICIAN QUERY
PT Name: Berto Chun  MR #: [id number]    Physician Query Form - Pathology Findings Clarification     CDS/: Brandy E Capley               Contact information: Spectralink: [phone]  This form is a permanent document in the medical record.     Query Date: December 7, 2017      By submitting this query, we are merely seeking further clarification of documentation.  Please utilize your independent clinical judgment when addressing the question(s) below.      The medical record contains the following:     Findings Supporting Clinical Information Location in Medical Record   SPECIMEN  1) Right breast  2) Right nipple core  3) Left axillary sentinel lymph node #1 (1136)  4) Left axillary sentinel lymph node #2 (297)  5) Left axillary sentinel lymph node #3 (30)  6) Left axillary sentinel lymph node #4 (145)  7) Left axillary sentinel lymph node #5 (220)  8) Left breast  9) Left nipple core  10) Left breast-additional anterior margin    FINAL PATHOLOGIC DIAGNOSIS  1. BREAST (RIGHT SKIN SPARING MASTECTOMY): FIBROCYSTIC CHANGES; NO EVIDENCE OF  MALIGNANCY.  2. BREAST (RIGHT NIPPLE, CLINICAL): NO EVIDENCE OF MALIGNANCY.  Note: Ribbon sections of the specimen were examined microscopically at deeper levels.  3. LYMPH NODE (LEFT SENTINEL LYMPH NODE, CLINICAL): METASTATIC CARCINOMA (pN1mi).  Note: Several intravascular nests of tumor are present within the lymph node which were not detected on lymph  node frozen section. The largest tumor nodule measures 0.3mm. All lymph node tissue was examined at three  levels with routine (H&E) stains and with three epithelial immunostains (AE1/AE3, Cam5.2, WSK). Positive and  negative controls reacted appropriately.  4. LYMPH NODE (LEFT SENTINEL LYMPH NODE, CLINICAL): NO EVIDENCE OF MALIGNANCY.  Note: All lymph node tissue was examined at three levels with routine (H&E) stains and with three epithelial  immunostains (AE1/AE3, Cam5.2, WSK). Positive and negative controls reacted  appropriately.  5. LYMPH NODE (LEFT SENTINEL LYMPH NODE, CLINICAL): NO EVIDENCE OF MALIGNANCY.  Note: All lymph node tissue was examined at three levels with routine (H&E) stains and with three epithelial  immunostains (AE1/AE3, Cam5.2, WSK). Positive and negative controls reacted appropriately.  6. LYMPH NODE (LEFT SENTINEL LYMPH NODE, CLINICAL): NO EVIDENCE OF MALIGNANCY.  Note: All lymph node tissue was examined at three levels with routine (H&E) stains and with three epithelial  immunostains (AE1/AE3, Cam5.2, WSK). Positive and negative controls reacted appropriately.  7. LYMPH NODE (LEFT SENTINEL LYMPH NODE, CLINICAL): NO EVIDENCE OF MALIGNANCY.  Note: All lymph node tissue was examined at three levels with routine (H&E) stains and with three epithelial  immunostains (AE1/AE3, Cam5.2, WSK). Positive and negative controls reacted appropriately.  8. BREAST (LEFT SKIN SPARING MASTECTOMY): INVASIVE DUCTAL CARCINOMA (1.4 CM) SURROUNDED  BY HIGH-GRADE DUCTAL CARCINOMA IN SITU (1.5 CM); ANTERIOR AND POSTERIOR RESECTION  MARGINS FREE OF MALIGNANCY.  Note: Hormone receptor assays are being performed and results will be issued as a Supplemental report.  9. BREAST (LEFT NIPPLE, CLINICAL): NO EVIDENCE OF MALIGNANCY.  Note: Ribbon sections of the specimen were examined microscopically at deeper levels.  10. BREAST AND ADIPOSE TISSUE (ANTERIOR MARGIN, CLINICAL): NO EVIDENCE OF MALIGNANCY.    SURGICAL PATHOLOGY CANCER CASE SUMMARY  INVASIVE CARCINOMA OF THE BREAST  Procedure: total mastectomy  Lymph nodes sampling: sentinel lymph nodes  Tumor site: upper inner quadrant  Histologic type: invasive ductal carcinoma  Tumor size: 1.4 cm  Histologic grade: intermediate grade (3, 2, 1)  Tumor focality: single focus  DCIS: present, high-grade DCIS (1.5cm)  LCIS: absent  Macroscopic and microscopic extent of tumor: na  Margins: uninvolved  Distance from closest margin: anterior; 0.9 cm from DCIS; 1.5 cm from invasive tumor  Lymph  nodes:  Number of sentinel lymph nodes examined: 5  Total lymph nodes examined: 5  Number of lymph nodes with macrometastases (>2mm): 0  Number of lymph nodes with micrometastases (>.2mm 2mm): 1  Number of lymph nodes with isolated tumor cells (</=.2mm): 0  Number of lymph nodes without tumor cells identified: 4  Size of largest metastasis: 3mm  Treatment effect: probable response to presurgical therapy in the invasive carcinoma  Stage: IB [pT1c pN1mi]  Diagnosed by: Petros Wasserman M.D.  (Electronically Signed: 2017-11-28 10:13:18)  Surgical pathology 11/20     Please document the clinical significance of the Pathologists findings of _ INVASIVE DUCTAL CARCINOMA SURROUNDED BY HIGH-GRADE DUCTAL CARCINOMA IN SITU of upper inner quadrant of left breast______.          [ x ] I agree with the Pathology Findings        [  ] I do not agree with the Pathology Findings        [  ] Clinically Undetermined        [  ] Other/Clarification of Findings: ______________________________________________    Please document in your progress notes daily for the duration of treatment until resolved and include in your discharge summary.

## 2017-12-07 NOTE — PHYSICIAN QUERY
PT Name: Berto Chun  MR #: [id number]    Physician Query Form - Pathology Findings Clarification     CDS/: Brandy E Capley               Contact information: Spectralink: [phone]  This form is a permanent document in the medical record.     Query Date: December 7, 2017      By submitting this query, we are merely seeking further clarification of documentation.  Please utilize your independent clinical judgment when addressing the question(s) below.          The medical record contains the following:     Findings Supporting Clinical Information Location in Medical Record   SPECIMEN  1) Right breast  2) Right nipple core  3) Left axillary sentinel lymph node #1 (1136)  4) Left axillary sentinel lymph node #2 (297)  5) Left axillary sentinel lymph node #3 (30)  6) Left axillary sentinel lymph node #4 (145)  7) Left axillary sentinel lymph node #5 (220)  8) Left breast  9) Left nipple core  10) Left breast-additional anterior margin    FINAL PATHOLOGIC DIAGNOSIS  1. BREAST (RIGHT SKIN SPARING MASTECTOMY): FIBROCYSTIC CHANGES; NO EVIDENCE OF  MALIGNANCY.  2. BREAST (RIGHT NIPPLE, CLINICAL): NO EVIDENCE OF MALIGNANCY.  Note: Ribbon sections of the specimen were examined microscopically at deeper levels.  3. LYMPH NODE (LEFT SENTINEL LYMPH NODE, CLINICAL): METASTATIC CARCINOMA (pN1mi).  Note: Several intravascular nests of tumor are present within the lymph node which were not detected on lymph  node frozen section. The largest tumor nodule measures 0.3mm. All lymph node tissue was examined at three  levels with routine (H&E) stains and with three epithelial immunostains (AE1/AE3, Cam5.2, WSK). Positive and  negative controls reacted appropriately.  4. LYMPH NODE (LEFT SENTINEL LYMPH NODE, CLINICAL): NO EVIDENCE OF MALIGNANCY.  Note: All lymph node tissue was examined at three levels with routine (H&E) stains and with three epithelial  immunostains (AE1/AE3, Cam5.2, WSK). Positive and negative controls reacted  appropriately.  5. LYMPH NODE (LEFT SENTINEL LYMPH NODE, CLINICAL): NO EVIDENCE OF MALIGNANCY.  Note: All lymph node tissue was examined at three levels with routine (H&E) stains and with three epithelial  immunostains (AE1/AE3, Cam5.2, WSK). Positive and negative controls reacted appropriately.  6. LYMPH NODE (LEFT SENTINEL LYMPH NODE, CLINICAL): NO EVIDENCE OF MALIGNANCY.  Note: All lymph node tissue was examined at three levels with routine (H&E) stains and with three epithelial  immunostains (AE1/AE3, Cam5.2, WSK). Positive and negative controls reacted appropriately.  7. LYMPH NODE (LEFT SENTINEL LYMPH NODE, CLINICAL): NO EVIDENCE OF MALIGNANCY.  Note: All lymph node tissue was examined at three levels with routine (H&E) stains and with three epithelial  immunostains (AE1/AE3, Cam5.2, WSK). Positive and negative controls reacted appropriately.  8. BREAST (LEFT SKIN SPARING MASTECTOMY): INVASIVE DUCTAL CARCINOMA (1.4 CM) SURROUNDED  BY HIGH-GRADE DUCTAL CARCINOMA IN SITU (1.5 CM); ANTERIOR AND POSTERIOR RESECTION  MARGINS FREE OF MALIGNANCY.  Note: Hormone receptor assays are being performed and results will be issued as a Supplemental report.  9. BREAST (LEFT NIPPLE, CLINICAL): NO EVIDENCE OF MALIGNANCY.  Note: Ribbon sections of the specimen were examined microscopically at deeper levels.  10. BREAST AND ADIPOSE TISSUE (ANTERIOR MARGIN, CLINICAL): NO EVIDENCE OF MALIGNANCY.    SURGICAL PATHOLOGY CANCER CASE SUMMARY  INVASIVE CARCINOMA OF THE BREAST  Procedure: total mastectomy  Lymph nodes sampling: sentinel lymph nodes  Tumor site: upper inner quadrant  Histologic type: invasive ductal carcinoma  Tumor size: 1.4 cm  Histologic grade: intermediate grade (3, 2, 1)  Tumor focality: single focus  DCIS: present, high-grade DCIS (1.5cm)  LCIS: absent  Macroscopic and microscopic extent of tumor: na  Margins: uninvolved  Distance from closest margin: anterior; 0.9 cm from DCIS; 1.5 cm from invasive tumor  Lymph  nodes:  Number of sentinel lymph nodes examined: 5  Total lymph nodes examined: 5  Number of lymph nodes with macrometastases (>2mm): 0  Number of lymph nodes with micrometastases (>.2mm 2mm): 1  Number of lymph nodes with isolated tumor cells (</=.2mm): 0  Number of lymph nodes without tumor cells identified: 4  Size of largest metastasis: 3mm  Treatment effect: probable response to presurgical therapy in the invasive carcinoma  Stage: IB [pT1c pN1mi]  Diagnosed by: Petros Wasserman M.D.  (Electronically Signed: 2017-11-28 10:13:18)  Surgical pathology 11/20     Please document the clinical significance of the Pathologists findings of _ METASTATIC CARCINOMA of left axillary lymph node______.          [x  ] I agree with the Pathology Findings        [  ] I do not agree with the Pathology Findings        [  ] Clinically Undetermined        [  ] Other/Clarification of Findings: ______________________________________________    Please document in your progress notes daily for the duration of treatment until resolved and include in your discharge summary.

## 2017-12-08 ENCOUNTER — TELEPHONE (OUTPATIENT)
Dept: INFUSION THERAPY | Facility: HOSPITAL | Age: 27
End: 2017-12-08

## 2017-12-10 ENCOUNTER — PATIENT MESSAGE (OUTPATIENT)
Dept: SURGERY | Facility: CLINIC | Age: 27
End: 2017-12-10

## 2017-12-18 ENCOUNTER — INFUSION (OUTPATIENT)
Dept: INFUSION THERAPY | Facility: HOSPITAL | Age: 27
End: 2017-12-18
Attending: INTERNAL MEDICINE
Payer: COMMERCIAL

## 2017-12-18 DIAGNOSIS — C50.011 MALIGNANT NEOPLASM OF NIPPLE OF RIGHT BREAST IN FEMALE, UNSPECIFIED ESTROGEN RECEPTOR STATUS: Primary | ICD-10-CM

## 2017-12-18 PROCEDURE — 63600175 PHARM REV CODE 636 W HCPCS: Mod: PN | Performed by: INTERNAL MEDICINE

## 2017-12-18 PROCEDURE — 25000003 PHARM REV CODE 250: Mod: PN | Performed by: INTERNAL MEDICINE

## 2017-12-18 PROCEDURE — A4216 STERILE WATER/SALINE, 10 ML: HCPCS | Mod: PN | Performed by: INTERNAL MEDICINE

## 2017-12-18 PROCEDURE — 96523 IRRIG DRUG DELIVERY DEVICE: CPT | Mod: PN

## 2017-12-18 RX ORDER — SODIUM CHLORIDE 0.9 % (FLUSH) 0.9 %
10 SYRINGE (ML) INJECTION
Status: COMPLETED | OUTPATIENT
Start: 2017-12-18 | End: 2017-12-18

## 2017-12-18 RX ORDER — HEPARIN 100 UNIT/ML
500 SYRINGE INTRAVENOUS
Status: CANCELLED | OUTPATIENT
Start: 2017-12-18

## 2017-12-18 RX ORDER — HEPARIN 100 UNIT/ML
500 SYRINGE INTRAVENOUS
Status: COMPLETED | OUTPATIENT
Start: 2017-12-18 | End: 2017-12-18

## 2017-12-18 RX ORDER — SODIUM CHLORIDE 0.9 % (FLUSH) 0.9 %
10 SYRINGE (ML) INJECTION
Status: CANCELLED | OUTPATIENT
Start: 2017-12-18

## 2017-12-18 RX ADMIN — HEPARIN SODIUM (PORCINE) LOCK FLUSH IV SOLN 100 UNIT/ML 500 UNITS: 100 SOLUTION at 11:12

## 2017-12-18 RX ADMIN — Medication 10 ML: at 11:12

## 2017-12-28 ENCOUNTER — OFFICE VISIT (OUTPATIENT)
Dept: SURGERY | Facility: CLINIC | Age: 27
End: 2017-12-28
Attending: SURGERY
Payer: COMMERCIAL

## 2017-12-28 VITALS
BODY MASS INDEX: 29.1 KG/M2 | HEIGHT: 63 IN | WEIGHT: 164.25 LBS | SYSTOLIC BLOOD PRESSURE: 120 MMHG | HEART RATE: 135 BPM | DIASTOLIC BLOOD PRESSURE: 77 MMHG

## 2017-12-28 DIAGNOSIS — C50.912 MALIGNANT NEOPLASM OF LEFT FEMALE BREAST, UNSPECIFIED ESTROGEN RECEPTOR STATUS, UNSPECIFIED SITE OF BREAST: Primary | ICD-10-CM

## 2017-12-28 PROCEDURE — 99024 POSTOP FOLLOW-UP VISIT: CPT | Mod: S$GLB,,, | Performed by: SURGERY

## 2017-12-28 RX ORDER — DESVENLAFAXINE SUCCINATE 50 MG/1
TABLET, EXTENDED RELEASE ORAL
COMMUNITY
Start: 2017-10-26 | End: 2018-01-12 | Stop reason: SDUPTHER

## 2017-12-28 RX ORDER — ALPRAZOLAM 0.25 MG/1
TABLET ORAL
COMMUNITY
Start: 2017-10-04 | End: 2018-02-14 | Stop reason: SDUPTHER

## 2017-12-28 NOTE — PROGRESS NOTES
REFERRING PHYSICIAN:  No referring provider defined for this encounter.       Brenda Jaimes MD      DIAGNOSIS:    This is a 27 y.o. female with a stage IIB pT2 N1 M0 grade 2 ER + [place] + HER2 negative of the left breast.    TREATMENT SUMMARY:  The patient is status post bilateral mastectomies and sentinel node biopsy on the left.  Final pathology showed invasive cancer 1.4cm with surrounding DCIS of 1.5cm and one lymph node with micrometastasis (0.3mm) on the left side. There were 4 additional lymph nodes negative for cancer. She was treated with 4 cycles of neoadjuvant Taxotere and four cycles of adjuvant Adriamycin/Cytoxan. Repeat PET scan now showing resolution of breast mass and decreased size in left axillary node prior to having surgery.    INTERVAL HISTORY:   Berto Chun comes in for a post-op check.  She denies fever, chills, chest pain or shortness of breath.  Her pain is well controlled.      MEDICATIONS:  Current Outpatient Prescriptions   Medication Sig Dispense Refill    ALPRAZolam (XANAX) 0.25 MG tablet       biotin 1 mg Cap Take by mouth.      bisacodyl (DULCOLAX) 5 mg EC tablet Take 5 mg by mouth daily as needed for Constipation.      clindamycin-tretinoin (ZIANA) gel Apply a thin coat to the affected area every night.      desvenlafaxine succinate (PRISTIQ) 50 MG Tb24        mg capsule TK 1 C PO D  0    ondansetron (ZOFRAN-ODT) 8 MG TbDL       PNV#75/IRON FUM/FA/OM3/DHA/EPA (ONE A DAY WOMEN'S PRENATAL DHA ORAL) Take 1 tablet by mouth once daily.      prochlorperazine (COMPAZINE) 10 MG tablet TK 1 T PO Q 6 H PRF NV  6    water Liqd 150 mL with MILK OF MAGNESIA 400 mg/5 mL Susp 400 mg, diphenhydrAMINE 12.5 mg/5 mL Elix 60 mg, nystatin 100,000 unit/mL Susp 500,000 Units SWISH AND SWALLOW 5ML PO Q 4 H PRF MOUTH/THROAT PAIN  6    hydrocodone-acetaminophen 5-325mg (NORCO) 5-325 mg per tablet Take 1 tablet by mouth every 4 (four) hours as needed for Pain. 15 tablet 0     No current  facility-administered medications for this visit.        ALLERGIES:   Review of patient's allergies indicates:  No Known Allergies    PHYSICAL EXAMINATION:   General:  This is a well appearing female with appropriate speech, affect and gait.     Breast:  Both breasts' incisions are clean, dry, and intact.    IMPRESSION:   The patient has had an uneventful postoperative course.    PLAN:   1.starting radiation.  2. She will return in 5 months for a follow up exam  3. The patient is advised in continued exam of the breast chest wall and to report to this office sooner should she note any areas of abnormality or concern.

## 2018-01-02 ENCOUNTER — PATIENT MESSAGE (OUTPATIENT)
Dept: HEMATOLOGY/ONCOLOGY | Facility: CLINIC | Age: 28
End: 2018-01-02

## 2018-01-03 ENCOUNTER — OFFICE VISIT (OUTPATIENT)
Dept: HEMATOLOGY/ONCOLOGY | Facility: CLINIC | Age: 28
End: 2018-01-03
Payer: COMMERCIAL

## 2018-01-03 ENCOUNTER — PATIENT MESSAGE (OUTPATIENT)
Dept: HEMATOLOGY/ONCOLOGY | Facility: CLINIC | Age: 28
End: 2018-01-03

## 2018-01-03 ENCOUNTER — LAB VISIT (OUTPATIENT)
Dept: LAB | Facility: HOSPITAL | Age: 28
End: 2018-01-03
Attending: INTERNAL MEDICINE
Payer: COMMERCIAL

## 2018-01-03 VITALS
WEIGHT: 160.69 LBS | HEIGHT: 63 IN | SYSTOLIC BLOOD PRESSURE: 126 MMHG | DIASTOLIC BLOOD PRESSURE: 76 MMHG | RESPIRATION RATE: 16 BRPM | TEMPERATURE: 98 F | HEART RATE: 105 BPM | BODY MASS INDEX: 28.47 KG/M2

## 2018-01-03 DIAGNOSIS — C50.212 MALIGNANT NEOPLASM OF UPPER-INNER QUADRANT OF LEFT FEMALE BREAST, UNSPECIFIED ESTROGEN RECEPTOR STATUS: Primary | ICD-10-CM

## 2018-01-03 DIAGNOSIS — C77.9 REGIONAL LYMPH NODE METASTASIS PRESENT: ICD-10-CM

## 2018-01-03 DIAGNOSIS — C50.212 MALIGNANT NEOPLASM OF UPPER-INNER QUADRANT OF LEFT FEMALE BREAST, UNSPECIFIED ESTROGEN RECEPTOR STATUS: ICD-10-CM

## 2018-01-03 LAB
ALBUMIN SERPL BCP-MCNC: 4.7 G/DL
ALP SERPL-CCNC: 79 U/L
ALT SERPL W/O P-5'-P-CCNC: 37 U/L
ANION GAP SERPL CALC-SCNC: 14 MMOL/L
AST SERPL-CCNC: 32 U/L
BASOPHILS # BLD AUTO: 0.02 K/UL
BASOPHILS NFR BLD: 0.4 %
BILIRUB SERPL-MCNC: 0.9 MG/DL
BUN SERPL-MCNC: 11 MG/DL
CALCIUM SERPL-MCNC: 9.5 MG/DL
CHLORIDE SERPL-SCNC: 101 MMOL/L
CO2 SERPL-SCNC: 27 MMOL/L
CREAT SERPL-MCNC: 0.68 MG/DL
DIFFERENTIAL METHOD: ABNORMAL
EOSINOPHIL # BLD AUTO: 0.2 K/UL
EOSINOPHIL NFR BLD: 3.4 %
ERYTHROCYTE [DISTWIDTH] IN BLOOD BY AUTOMATED COUNT: 11.9 %
EST. GFR  (AFRICAN AMERICAN): >60 ML/MIN/1.73 M^2
EST. GFR  (NON AFRICAN AMERICAN): >60 ML/MIN/1.73 M^2
GLUCOSE SERPL-MCNC: 105 MG/DL
HCT VFR BLD AUTO: 37.9 %
HGB BLD-MCNC: 13 G/DL
LDH SERPL L TO P-CCNC: 456 U/L
LYMPHOCYTES # BLD AUTO: 1.8 K/UL
LYMPHOCYTES NFR BLD: 36.9 %
MAGNESIUM SERPL-MCNC: 1.8 MG/DL
MCH RBC QN AUTO: 30.8 PG
MCHC RBC AUTO-ENTMCNC: 34.3 G/DL
MCV RBC AUTO: 90 FL
MONOCYTES # BLD AUTO: 0.5 K/UL
MONOCYTES NFR BLD: 9.6 %
NEUTROPHILS # BLD AUTO: 2.4 K/UL
NEUTROPHILS NFR BLD: 49.7 %
NRBC BLD-RTO: 0 /100 WBC
PLATELET # BLD AUTO: 182 K/UL
PMV BLD AUTO: 9.1 FL
POTASSIUM SERPL-SCNC: 4.2 MMOL/L
PROT SERPL-MCNC: 8.3 G/DL
RBC # BLD AUTO: 4.22 M/UL
SODIUM SERPL-SCNC: 142 MMOL/L
WBC # BLD AUTO: 4.77 K/UL

## 2018-01-03 PROCEDURE — 83615 LACTATE (LD) (LDH) ENZYME: CPT

## 2018-01-03 PROCEDURE — 80053 COMPREHEN METABOLIC PANEL: CPT

## 2018-01-03 PROCEDURE — 83735 ASSAY OF MAGNESIUM: CPT | Mod: PN

## 2018-01-03 PROCEDURE — 99215 OFFICE O/P EST HI 40 MIN: CPT | Mod: S$GLB,,, | Performed by: INTERNAL MEDICINE

## 2018-01-03 PROCEDURE — 99999 PR PBB SHADOW E&M-EST. PATIENT-LVL III: CPT | Mod: PBBFAC,,, | Performed by: INTERNAL MEDICINE

## 2018-01-03 PROCEDURE — 85025 COMPLETE CBC W/AUTO DIFF WBC: CPT | Mod: PN

## 2018-01-03 PROCEDURE — 83735 ASSAY OF MAGNESIUM: CPT

## 2018-01-03 PROCEDURE — 83615 LACTATE (LD) (LDH) ENZYME: CPT | Mod: PN

## 2018-01-03 PROCEDURE — 85025 COMPLETE CBC W/AUTO DIFF WBC: CPT

## 2018-01-03 PROCEDURE — 36415 COLL VENOUS BLD VENIPUNCTURE: CPT | Mod: PN

## 2018-01-03 PROCEDURE — 80053 COMPREHEN METABOLIC PANEL: CPT | Mod: PN

## 2018-01-03 RX ORDER — CAPECITABINE 500 MG/1
1500 TABLET, FILM COATED ORAL 2 TIMES DAILY
Qty: 84 TABLET | Refills: 5 | Status: SHIPPED | OUTPATIENT
Start: 2018-01-03 | End: 2018-01-03 | Stop reason: SDUPTHER

## 2018-01-03 RX ORDER — CAPECITABINE 500 MG/1
1500 TABLET, FILM COATED ORAL 2 TIMES DAILY
Qty: 84 TABLET | Refills: 5 | Status: SHIPPED | OUTPATIENT
Start: 2018-01-03 | End: 2018-01-11

## 2018-01-03 RX ORDER — CAPECITABINE 150 MG/1
300 TABLET, FILM COATED ORAL 2 TIMES DAILY
Qty: 56 TABLET | Refills: 5 | Status: SHIPPED | OUTPATIENT
Start: 2018-01-03 | End: 2018-01-03 | Stop reason: SDUPTHER

## 2018-01-03 RX ORDER — CAPECITABINE 150 MG/1
300 TABLET, FILM COATED ORAL 2 TIMES DAILY
Qty: 56 TABLET | Refills: 5 | Status: SHIPPED | OUTPATIENT
Start: 2018-01-03 | End: 2018-01-11

## 2018-01-04 ENCOUNTER — TELEPHONE (OUTPATIENT)
Dept: PHARMACY | Facility: CLINIC | Age: 28
End: 2018-01-04

## 2018-01-04 NOTE — PROGRESS NOTES
Date of Service: 01/03/2018  HISTORY OF PRESENT ILLNESS:  The patient is a 27-year-old white female well   known to me for locally advanced left breast carcinoma, whose tumor was ER   positive, MI negative, HER-2/raeann negative.  She was treated with neoadjuvant   chemotherapy that included four cycles of Adriamycin/Cytoxan and four cycles of   Taxotere.  She has gone on to have bilateral skin-sparing mastectomies and   tissue expander placement.  She is recovering well from surgery and has had   subsequent fills of her tissue expanders without complication.  She returns to   the clinic to review interval pathology and to further plan care.  She has been   seen and evaluated by my colleague, Dr. Marlow regarding need of postoperative   adjuvant irradiation therapy and is awaiting stimulation for such.  No other   complaints or pertinent findings on a 14-point review of systems.    PHYSICAL EXAMINATION:  GENERAL:  The patient is a well-developed, well-nourished white female in no   acute distress, with a weight of 160-1/2 pounds (decreased by 7 pounds).  VITAL SIGNS:  Documented in EMR and reviewed.  HEENT:  Normocephalic, atraumatic.  Oral mucosa pink and moist.  Lips without   lesions.  Tongue midline.  Oropharynx clear.  Nonicteric sclerae.   NECK:  Supple, no adenopathy.  No carotid bruits, thyromegaly or thyroid nodule.  HEART:  Regular rate and rhythm without murmur, gallop or rub.   LUNGS:  Clear to auscultation bilaterally.  Normal respiratory effort.    ABDOMEN:  Soft, nontender, nondistended with positive normoactive bowel sounds,   no hepatosplenomegaly.   EXTREMITIES:  No cyanosis, clubbing or edema.  Distal pulses are intact.  AXILLAE AND GROIN:  No palpable pathologic lymphadenopathy is appreciated.        SKIN:  Intact/turgor normal.  NEUROLOGIC:  Cranial nerves II-XII grossly intact.  Motor:  Good muscle bulk and   tone.  Strength/sensory 5/5 throughout.  Gait stable.   BREASTS:  Both surgically  reconstructed with tissue expanders in place.    LABORATORY:  White count 4.8, H and H 13 and 37.9, platelet count of 182.    Sodium 142, potassium 4.2, chloride 101, CO2 27, BUN 11, creatinine 0.7, glucose   105, calcium 9.5, mag 1.8.  Liver function tests are within normal limits.  LDH   is 456.  GFR greater than 60.    Surgical Pathology:  No malignant pathology noted within the right breast.   Within the patient's left breast, there was a residual 1.4 cm focus of   infiltrating ductal carcinoma surrounded by high-grade DCIS.  The DCIS measured   1.5 cm.  Margins of resection were free from both invasive and intraductal   carcinoma.  One left sentinel lymph node was positive for multiple intravascular   nests of tumor with the largest deposit measuring 0.3 mm.    IMPRESSION:  Locally advanced left breast carcinoma with suboptimal response to   neoadjuvant chemotherapy.    PLAN:  1.  Proceed with adjuvant irradiation therapy as planned by Dr. Marlow.  2.  Following recovery from this, I have asked the patient to undergo additional   adjuvant chemotherapy which will consist of Xeloda at a dose of 2000 mg/m2 per   day in divided doses to consist of 1800 mg p.o. b.i.d. days 1 through 14 of each   21-day cycle of therapy.  In all, the patient will receive six such cycles of   therapy before being placed on adjuvant endocrine therapy, which will initially   consist of tamoxifen 20 mg daily.  3.  The patient has had 40 minutes of contact time spent counseling her,   concerning the results of her surgical pathology, rationale for receipt of   additional adjuvant chemotherapy, aspects of its administration, potential side   effects, interventions for these, etc.  She voices understanding and wishes to   proceed as above.  4.  I plan to see her back in clinic approximately 2 weeks from the end of   adjuvant external beam radiation therapy with interval CBC, CMP, LDH and mag in   association with that  tish COYNE  dd: 01/03/2018 17:08:07 (CST)  td: 01/04/2018 00:55:35 (CST)  Doc ID   #[id number]  Job ID #[id number]    CC:

## 2018-01-05 ENCOUNTER — TELEPHONE (OUTPATIENT)
Dept: HEMATOLOGY/ONCOLOGY | Facility: CLINIC | Age: 28
End: 2018-01-05

## 2018-01-05 DIAGNOSIS — C50.919 MALIGNANT NEOPLASM OF FEMALE BREAST, UNSPECIFIED ESTROGEN RECEPTOR STATUS, UNSPECIFIED LATERALITY, UNSPECIFIED SITE OF BREAST: Primary | ICD-10-CM

## 2018-01-05 RX ORDER — DESVENLAFAXINE SUCCINATE 50 MG/1
50 TABLET, EXTENDED RELEASE ORAL DAILY
Qty: 30 TABLET | Refills: 0 | Status: CANCELLED | OUTPATIENT
Start: 2018-01-05 | End: 2018-02-04

## 2018-01-05 NOTE — TELEPHONE ENCOUNTER
Spoke with patient regarding Xeloda, pt starts XRT on 1/4, last date 2/15, will arrange class early Feb.  Pt verbalized understanding.

## 2018-01-05 NOTE — TELEPHONE ENCOUNTER
[facility] received prescription for capecitabine 1800mg twice daily for 14 days then 7 days off.  Prior authorization is required.

## 2018-01-05 NOTE — TELEPHONE ENCOUNTER
DOCUMENTATION ONLY   PA submitted to insurance   1/9/2018  PA approved until 1/2019  Copay is 100% cost of the medication up to $3200  After oop of 3200 is met patient pay 0.00  Researching assistance

## 2018-01-09 ENCOUNTER — PATIENT MESSAGE (OUTPATIENT)
Dept: HEMATOLOGY/ONCOLOGY | Facility: CLINIC | Age: 28
End: 2018-01-09

## 2018-01-10 DIAGNOSIS — C77.9 REGIONAL LYMPH NODE METASTASIS PRESENT: ICD-10-CM

## 2018-01-10 DIAGNOSIS — C50.212 MALIGNANT NEOPLASM OF UPPER-INNER QUADRANT OF LEFT FEMALE BREAST, UNSPECIFIED ESTROGEN RECEPTOR STATUS: ICD-10-CM

## 2018-01-10 DIAGNOSIS — C50.619 MALIGNANT NEOPLASM OF AXILLARY TAIL OF FEMALE BREAST, UNSPECIFIED ESTROGEN RECEPTOR STATUS, UNSPECIFIED LATERALITY: Primary | ICD-10-CM

## 2018-01-10 RX ORDER — CAPECITABINE 150 MG/1
300 TABLET, FILM COATED ORAL 2 TIMES DAILY
Qty: 56 TABLET | Refills: 5 | Status: CANCELLED | OUTPATIENT
Start: 2018-01-10 | End: 2018-01-24

## 2018-01-10 RX ORDER — CAPECITABINE 500 MG/1
1500 TABLET, FILM COATED ORAL 2 TIMES DAILY
Qty: 84 TABLET | Refills: 5 | Status: CANCELLED | OUTPATIENT
Start: 2018-01-10 | End: 2018-01-24

## 2018-01-11 RX ORDER — CAPECITABINE 500 MG/1
TABLET, FILM COATED ORAL
Qty: 84 TABLET | Refills: 0 | Status: SHIPPED | OUTPATIENT
Start: 2018-01-11 | End: 2018-01-12 | Stop reason: CLARIF

## 2018-01-11 RX ORDER — CAPECITABINE 150 MG/1
TABLET, FILM COATED ORAL
Qty: 56 TABLET | Refills: 0 | Status: SHIPPED | OUTPATIENT
Start: 2018-01-11 | End: 2018-01-12 | Stop reason: CLARIF

## 2018-01-12 DIAGNOSIS — C77.9 REGIONAL LYMPH NODE METASTASIS PRESENT: ICD-10-CM

## 2018-01-12 DIAGNOSIS — C50.212 MALIGNANT NEOPLASM OF UPPER-INNER QUADRANT OF LEFT FEMALE BREAST, UNSPECIFIED ESTROGEN RECEPTOR STATUS: ICD-10-CM

## 2018-01-12 DIAGNOSIS — C50.619 MALIGNANT NEOPLASM OF AXILLARY TAIL OF FEMALE BREAST, UNSPECIFIED ESTROGEN RECEPTOR STATUS, UNSPECIFIED LATERALITY: Primary | ICD-10-CM

## 2018-01-12 DIAGNOSIS — C50.619 MALIGNANT NEOPLASM OF AXILLARY TAIL OF FEMALE BREAST, UNSPECIFIED ESTROGEN RECEPTOR STATUS, UNSPECIFIED LATERALITY: ICD-10-CM

## 2018-01-12 DIAGNOSIS — C50.212 MALIGNANT NEOPLASM OF UPPER-INNER QUADRANT OF LEFT FEMALE BREAST, UNSPECIFIED ESTROGEN RECEPTOR STATUS: Primary | ICD-10-CM

## 2018-01-12 RX ORDER — CAPECITABINE 150 MG/1
TABLET, FILM COATED ORAL
Qty: 56 TABLET | Refills: 0 | Status: SHIPPED | OUTPATIENT
Start: 2018-01-12 | End: 2018-02-23 | Stop reason: SDUPTHER

## 2018-01-12 RX ORDER — CAPECITABINE 500 MG/1
TABLET, FILM COATED ORAL
Qty: 84 TABLET | Refills: 0 | Status: SHIPPED | OUTPATIENT
Start: 2018-01-12 | End: 2018-02-23 | Stop reason: SDUPTHER

## 2018-01-12 RX ORDER — DESVENLAFAXINE SUCCINATE 50 MG/1
50 TABLET, EXTENDED RELEASE ORAL DAILY
Qty: 30 TABLET | Refills: 0 | Status: SHIPPED | OUTPATIENT
Start: 2018-01-12 | End: 2018-03-13 | Stop reason: SDUPTHER

## 2018-01-15 ENCOUNTER — TELEPHONE (OUTPATIENT)
Dept: INFUSION THERAPY | Facility: HOSPITAL | Age: 28
End: 2018-01-15

## 2018-01-15 NOTE — TELEPHONE ENCOUNTER
----- Message from Kevin Erickson sent at 1/15/2018  1:06 PM CST -----  Contact: self   Patient want to speak with a nurse regarding rescheduling appointment please call back at [phone]

## 2018-01-22 ENCOUNTER — PATIENT MESSAGE (OUTPATIENT)
Dept: HEMATOLOGY/ONCOLOGY | Facility: CLINIC | Age: 28
End: 2018-01-22

## 2018-01-29 ENCOUNTER — INFUSION (OUTPATIENT)
Dept: INFUSION THERAPY | Facility: HOSPITAL | Age: 28
End: 2018-01-29
Attending: INTERNAL MEDICINE
Payer: COMMERCIAL

## 2018-01-29 DIAGNOSIS — Z85.3 HISTORY OF BREAST CANCER: Primary | ICD-10-CM

## 2018-01-29 DIAGNOSIS — C50.011 MALIGNANT NEOPLASM OF NIPPLE OF RIGHT BREAST IN FEMALE, UNSPECIFIED ESTROGEN RECEPTOR STATUS: ICD-10-CM

## 2018-01-29 PROCEDURE — A4216 STERILE WATER/SALINE, 10 ML: HCPCS | Mod: PN | Performed by: INTERNAL MEDICINE

## 2018-01-29 PROCEDURE — 96523 IRRIG DRUG DELIVERY DEVICE: CPT | Mod: PN

## 2018-01-29 PROCEDURE — 25000003 PHARM REV CODE 250: Mod: PN | Performed by: INTERNAL MEDICINE

## 2018-01-29 PROCEDURE — 63600175 PHARM REV CODE 636 W HCPCS: Mod: PN | Performed by: INTERNAL MEDICINE

## 2018-01-29 RX ORDER — SODIUM CHLORIDE 0.9 % (FLUSH) 0.9 %
10 SYRINGE (ML) INJECTION
Status: CANCELLED | OUTPATIENT
Start: 2018-01-29

## 2018-01-29 RX ORDER — HEPARIN 100 UNIT/ML
500 SYRINGE INTRAVENOUS
Status: CANCELLED | OUTPATIENT
Start: 2018-01-29

## 2018-01-29 RX ORDER — SODIUM CHLORIDE 0.9 % (FLUSH) 0.9 %
10 SYRINGE (ML) INJECTION
Status: COMPLETED | OUTPATIENT
Start: 2018-01-29 | End: 2018-01-29

## 2018-01-29 RX ORDER — HEPARIN 100 UNIT/ML
500 SYRINGE INTRAVENOUS
Status: COMPLETED | OUTPATIENT
Start: 2018-01-29 | End: 2018-01-29

## 2018-01-29 RX ADMIN — HEPARIN SODIUM (PORCINE) LOCK FLUSH IV SOLN 100 UNIT/ML 500 UNITS: 100 SOLUTION at 04:01

## 2018-01-29 RX ADMIN — Medication 10 ML: at 04:01

## 2018-01-30 ENCOUNTER — TELEPHONE (OUTPATIENT)
Dept: HEMATOLOGY/ONCOLOGY | Facility: CLINIC | Age: 28
End: 2018-01-30

## 2018-01-30 ENCOUNTER — PATIENT MESSAGE (OUTPATIENT)
Dept: HEMATOLOGY/ONCOLOGY | Facility: CLINIC | Age: 28
End: 2018-01-30

## 2018-01-30 NOTE — TELEPHONE ENCOUNTER
Spoke with patient in regards to dosing off xeloda. Patient confused with 2 different strengths of pills. I explained to patient that she is to take 3, 500mg and 2,  150 mg pills (xeloda) twice a day, for a total of 1800mg BID. I told her that her medication was based on weight and that she was prescribed a dose of 2,000 mg/m2 a day and is calculated by weight.  Patient verbalized understanding .

## 2018-02-14 ENCOUNTER — PATIENT MESSAGE (OUTPATIENT)
Dept: HEMATOLOGY/ONCOLOGY | Facility: CLINIC | Age: 28
End: 2018-02-14

## 2018-02-14 DIAGNOSIS — F41.9 ANXIETY: Primary | ICD-10-CM

## 2018-02-14 RX ORDER — ALPRAZOLAM 0.25 MG/1
0.25 TABLET ORAL 3 TIMES DAILY PRN
Qty: 90 TABLET | Refills: 3 | Status: SHIPPED | OUTPATIENT
Start: 2018-02-14 | End: 2018-04-27 | Stop reason: SDUPTHER

## 2018-02-16 ENCOUNTER — PATIENT MESSAGE (OUTPATIENT)
Dept: SURGERY | Facility: CLINIC | Age: 28
End: 2018-02-16

## 2018-02-19 DIAGNOSIS — Z91.89 AT RISK FOR LYMPHEDEMA: Primary | ICD-10-CM

## 2018-02-22 ENCOUNTER — LAB VISIT (OUTPATIENT)
Dept: LAB | Facility: HOSPITAL | Age: 28
End: 2018-02-22
Attending: INTERNAL MEDICINE
Payer: COMMERCIAL

## 2018-02-22 ENCOUNTER — TELEPHONE (OUTPATIENT)
Dept: SURGERY | Facility: CLINIC | Age: 28
End: 2018-02-22

## 2018-02-22 DIAGNOSIS — C50.212 MALIGNANT NEOPLASM OF UPPER-INNER QUADRANT OF LEFT FEMALE BREAST, UNSPECIFIED ESTROGEN RECEPTOR STATUS: ICD-10-CM

## 2018-02-22 LAB
ALBUMIN SERPL BCP-MCNC: 5 G/DL
ALP SERPL-CCNC: 64 U/L
ALT SERPL W/O P-5'-P-CCNC: 36 U/L
ANION GAP SERPL CALC-SCNC: 13 MMOL/L
AST SERPL-CCNC: 32 U/L
BASOPHILS # BLD AUTO: 0.02 K/UL
BASOPHILS NFR BLD: 0.5 %
BILIRUB SERPL-MCNC: 0.9 MG/DL
BUN SERPL-MCNC: 9 MG/DL
CALCIUM SERPL-MCNC: 9.5 MG/DL
CANCER AG19-9 SERPL-ACNC: 7 U/ML
CHLORIDE SERPL-SCNC: 101 MMOL/L
CO2 SERPL-SCNC: 27 MMOL/L
CREAT SERPL-MCNC: 0.66 MG/DL
DIFFERENTIAL METHOD: ABNORMAL
EOSINOPHIL # BLD AUTO: 0.1 K/UL
EOSINOPHIL NFR BLD: 1.9 %
ERYTHROCYTE [DISTWIDTH] IN BLOOD BY AUTOMATED COUNT: 13 %
EST. GFR  (AFRICAN AMERICAN): >60 ML/MIN/1.73 M^2
EST. GFR  (NON AFRICAN AMERICAN): >60 ML/MIN/1.73 M^2
GLUCOSE SERPL-MCNC: 115 MG/DL
HCT VFR BLD AUTO: 35.5 %
HGB BLD-MCNC: 12.4 G/DL
LDH SERPL L TO P-CCNC: 426 U/L
LYMPHOCYTES # BLD AUTO: 0.7 K/UL
LYMPHOCYTES NFR BLD: 17.4 %
MAGNESIUM SERPL-MCNC: 1.7 MG/DL
MCH RBC QN AUTO: 30.7 PG
MCHC RBC AUTO-ENTMCNC: 34.9 G/DL
MCV RBC AUTO: 88 FL
MONOCYTES # BLD AUTO: 0.5 K/UL
MONOCYTES NFR BLD: 11.5 %
NEUTROPHILS # BLD AUTO: 2.9 K/UL
NEUTROPHILS NFR BLD: 68.7 %
NRBC BLD-RTO: 0 /100 WBC
PLATELET # BLD AUTO: 172 K/UL
PMV BLD AUTO: 8.6 FL
POTASSIUM SERPL-SCNC: 3.7 MMOL/L
PROT SERPL-MCNC: 8.3 G/DL
RBC # BLD AUTO: 4.04 M/UL
SODIUM SERPL-SCNC: 141 MMOL/L
WBC # BLD AUTO: 4.26 K/UL

## 2018-02-22 PROCEDURE — 83615 LACTATE (LD) (LDH) ENZYME: CPT | Mod: PN

## 2018-02-22 PROCEDURE — 86301 IMMUNOASSAY TUMOR CA 19-9: CPT

## 2018-02-22 PROCEDURE — 85025 COMPLETE CBC W/AUTO DIFF WBC: CPT | Mod: PN

## 2018-02-22 PROCEDURE — 80053 COMPREHEN METABOLIC PANEL: CPT | Mod: PN

## 2018-02-22 PROCEDURE — 83735 ASSAY OF MAGNESIUM: CPT | Mod: PN

## 2018-02-22 PROCEDURE — 83615 LACTATE (LD) (LDH) ENZYME: CPT

## 2018-02-22 PROCEDURE — 83735 ASSAY OF MAGNESIUM: CPT

## 2018-02-22 PROCEDURE — 36415 COLL VENOUS BLD VENIPUNCTURE: CPT | Mod: PN

## 2018-02-22 PROCEDURE — 80053 COMPREHEN METABOLIC PANEL: CPT

## 2018-02-22 PROCEDURE — 85025 COMPLETE CBC W/AUTO DIFF WBC: CPT

## 2018-02-22 NOTE — TELEPHONE ENCOUNTER
----- Message from Paula Mcintyre sent at 2/22/2018 11:54 AM CST -----  Contact: emre/[facility]  [phone]//caller states that she needs to speak with nurse in ref to a referral for pts PT//please call//thank you

## 2018-02-22 NOTE — TELEPHONE ENCOUNTER
Returned call, per emre previously faxed referral did not go through. Re-faxed to her at number requested

## 2018-02-23 DIAGNOSIS — C50.212 MALIGNANT NEOPLASM OF UPPER-INNER QUADRANT OF LEFT FEMALE BREAST, UNSPECIFIED ESTROGEN RECEPTOR STATUS: ICD-10-CM

## 2018-02-23 DIAGNOSIS — C77.9 REGIONAL LYMPH NODE METASTASIS PRESENT: ICD-10-CM

## 2018-02-23 DIAGNOSIS — C50.619 MALIGNANT NEOPLASM OF AXILLARY TAIL OF FEMALE BREAST, UNSPECIFIED ESTROGEN RECEPTOR STATUS, UNSPECIFIED LATERALITY: ICD-10-CM

## 2018-02-23 RX ORDER — CAPECITABINE 500 MG/1
TABLET, FILM COATED ORAL
Qty: 84 TABLET | Refills: 5 | Status: SHIPPED | OUTPATIENT
Start: 2018-02-23 | End: 2018-10-12

## 2018-02-23 RX ORDER — CAPECITABINE 150 MG/1
TABLET, FILM COATED ORAL
Qty: 56 TABLET | Refills: 5 | Status: SHIPPED | OUTPATIENT
Start: 2018-02-23 | End: 2018-10-12

## 2018-02-28 ENCOUNTER — OFFICE VISIT (OUTPATIENT)
Dept: HEMATOLOGY/ONCOLOGY | Facility: CLINIC | Age: 28
End: 2018-02-28
Payer: COMMERCIAL

## 2018-02-28 VITALS
WEIGHT: 157.63 LBS | BODY MASS INDEX: 27.93 KG/M2 | DIASTOLIC BLOOD PRESSURE: 70 MMHG | HEIGHT: 63 IN | HEART RATE: 83 BPM | TEMPERATURE: 99 F | RESPIRATION RATE: 17 BRPM | SYSTOLIC BLOOD PRESSURE: 113 MMHG

## 2018-02-28 DIAGNOSIS — N95.1 MENOPAUSAL SYNDROME (HOT FLASHES): ICD-10-CM

## 2018-02-28 DIAGNOSIS — C50.619 MALIGNANT NEOPLASM OF AXILLARY TAIL OF FEMALE BREAST, UNSPECIFIED ESTROGEN RECEPTOR STATUS, UNSPECIFIED LATERALITY: ICD-10-CM

## 2018-02-28 DIAGNOSIS — N91.1 AMENORRHEA, SECONDARY: ICD-10-CM

## 2018-02-28 DIAGNOSIS — Z17.0 MALIGNANT NEOPLASM OF UPPER-INNER QUADRANT OF LEFT BREAST IN FEMALE, ESTROGEN RECEPTOR POSITIVE: Primary | ICD-10-CM

## 2018-02-28 DIAGNOSIS — C50.212 MALIGNANT NEOPLASM OF UPPER-INNER QUADRANT OF LEFT BREAST IN FEMALE, ESTROGEN RECEPTOR POSITIVE: Primary | ICD-10-CM

## 2018-02-28 PROCEDURE — 99215 OFFICE O/P EST HI 40 MIN: CPT | Mod: S$GLB,,, | Performed by: INTERNAL MEDICINE

## 2018-02-28 PROCEDURE — 99999 PR PBB SHADOW E&M-EST. PATIENT-LVL IV: CPT | Mod: PBBFAC,,, | Performed by: INTERNAL MEDICINE

## 2018-02-28 RX ORDER — DESVENLAFAXINE SUCCINATE 50 MG/1
50 TABLET, EXTENDED RELEASE ORAL DAILY
Qty: 30 TABLET | Refills: 0 | OUTPATIENT
Start: 2018-02-28 | End: 2018-03-30

## 2018-02-28 RX ORDER — VENLAFAXINE HYDROCHLORIDE 37.5 MG/1
37.5 CAPSULE, EXTENDED RELEASE ORAL DAILY
Qty: 90 CAPSULE | Refills: 3 | Status: SHIPPED | OUTPATIENT
Start: 2018-02-28 | End: 2018-04-10 | Stop reason: ALTCHOICE

## 2018-03-01 DIAGNOSIS — C50.919 MALIGNANT NEOPLASM OF FEMALE BREAST, UNSPECIFIED ESTROGEN RECEPTOR STATUS, UNSPECIFIED LATERALITY, UNSPECIFIED SITE OF BREAST: Primary | ICD-10-CM

## 2018-03-01 NOTE — TELEPHONE ENCOUNTER
Confirmed with patient start date of Xeloda, 3/19.  Pt will come to office on 3/12 to sign consent.  4 wk followup lab, clinic and port flush scheduled for 4/13, verbalized understanding.  No further questions.

## 2018-03-01 NOTE — PROGRESS NOTES
Date of Service: 02/28/2018  This is a 27-year-old white female known to me for locally advanced left breast   carcinoma.  Her tumor is ER positive, MT negative, and HER-2/raeann negative.  She   was treated neoadjuvantly with four cycles of Adriamycin/Cytoxan followed by   four cycles of Taxotere.  She went on to have bilateral skin-sparing   mastectomies and tissue expander replacement.  She has recently completed   adjuvant external beam radiation therapy to the left breast.  She had   significant skin toxicity in association with therapy.  Currently, she is a   little less than two weeks out from completion of radiation therapy.  Her energy   level is well maintained.  She is not experiencing nausea or vomiting.  She   remains amenorrheic at this time.  No other pertinent findings on a 14-point   review of systems.    REVIEW OF SYSTEMS:  The patient is experiencing significant hot flashes.    PHYSICAL EXAMINATION:  GENERAL:  The patient is a well-developed, well-nourished white female in no   acute distress.  VITAL SIGNS:  Weight of 157-1/2 pounds (decreased by 3 pounds).  HEENT:  Normocephalic, atraumatic.  Oral mucosa pink and moist.  Lips without   lesions.  Tongue midline.  Oropharynx clear.  Nonicteric sclerae.   NECK:  Supple, no adenopathy.  No carotid bruits, thyromegaly or thyroid nodule.                                                                        HEART:  Regular rate and rhythm without murmur, gallop or rub.                LUNGS:  Clear to auscultation bilaterally.  Normal respiratory effort.       ABDOMEN:  Soft, nontender, nondistended with positive normoactive bowel sounds,   no hepatosplenomegaly.    EXTREMITIES:  No cyanosis, clubbing or edema.  Distal pulses are intact.                                              AXILLAE AND GROIN:  No palpable pathologic lymphadenopathy is appreciated.        SKIN:  Intact/turgor normal                                                               NEUROLOGIC:  Cranial nerves II-XII grossly intact.  Motor:  Good muscle bulk and   tone.  Strength/sensory 5/5 throughout.  Gait stable.   BREASTS:  Both surgically reconstructed with tissue expanders.  There is minimal   dry desquamation in association with recent irradiation to the left breast.    LABORATORY:  White count 4.3, H and H 12.4 and 35.5, and platelets 172.  Sodium   141, potassium 3.7, chloride 101, CO2 of 27, BUN of 9, creatinine 0.7, glucose   115, calcium 9.5, and mag 1.7.  Liver function tests within normal limits.  LDH   426, GFR greater than 60.    IMPRESSION:  1.  Locally advanced left breast carcinoma with suboptimal response to   neoadjuvant chemotherapy.  2.  Chemotherapy-induced amenorrhea with associated hot flashes.     PLAN:  1.  Venlafaxine 37.5 mg daily for control of hot flashes.  2.  We will initiate postoperative adjuvant Xeloda at a dose of 1800 mg p.o.   b.i.d. days 1 through 14 of each 21-day cycle for six cycles starting on   03/19/2018.  3.  The patient will be reevaluated by myself four weeks later with interval   CBC, CMP, LDH and mag.      CHRIS/HN  dd: 02/28/2018 19:07:00 (CST)  td: 03/01/2018 15:46:01 (CST)  Doc ID   #[id number]  Job ID #[id number]    CC:

## 2018-03-12 ENCOUNTER — INFUSION (OUTPATIENT)
Dept: INFUSION THERAPY | Facility: HOSPITAL | Age: 28
End: 2018-03-12
Attending: INTERNAL MEDICINE
Payer: COMMERCIAL

## 2018-03-12 DIAGNOSIS — C50.011 MALIGNANT NEOPLASM OF NIPPLE OF RIGHT BREAST IN FEMALE, UNSPECIFIED ESTROGEN RECEPTOR STATUS: Primary | ICD-10-CM

## 2018-03-12 PROCEDURE — A4216 STERILE WATER/SALINE, 10 ML: HCPCS | Mod: PN | Performed by: INTERNAL MEDICINE

## 2018-03-12 PROCEDURE — 96523 IRRIG DRUG DELIVERY DEVICE: CPT | Mod: PN

## 2018-03-12 PROCEDURE — 63600175 PHARM REV CODE 636 W HCPCS: Mod: PN | Performed by: INTERNAL MEDICINE

## 2018-03-12 PROCEDURE — 25000003 PHARM REV CODE 250: Mod: PN | Performed by: INTERNAL MEDICINE

## 2018-03-12 RX ORDER — SODIUM CHLORIDE 0.9 % (FLUSH) 0.9 %
10 SYRINGE (ML) INJECTION
Status: CANCELLED | OUTPATIENT
Start: 2018-03-12

## 2018-03-12 RX ORDER — SODIUM CHLORIDE 0.9 % (FLUSH) 0.9 %
10 SYRINGE (ML) INJECTION
Status: COMPLETED | OUTPATIENT
Start: 2018-03-12 | End: 2018-03-12

## 2018-03-12 RX ORDER — HEPARIN 100 UNIT/ML
500 SYRINGE INTRAVENOUS
Status: COMPLETED | OUTPATIENT
Start: 2018-03-12 | End: 2018-03-12

## 2018-03-12 RX ORDER — HEPARIN 100 UNIT/ML
500 SYRINGE INTRAVENOUS
Status: CANCELLED | OUTPATIENT
Start: 2018-03-12

## 2018-03-12 RX ADMIN — Medication 10 ML: at 04:03

## 2018-03-12 RX ADMIN — HEPARIN SODIUM (PORCINE) LOCK FLUSH IV SOLN 100 UNIT/ML 500 UNITS: 100 SOLUTION at 04:03

## 2018-03-13 DIAGNOSIS — C50.619 MALIGNANT NEOPLASM OF AXILLARY TAIL OF FEMALE BREAST, UNSPECIFIED ESTROGEN RECEPTOR STATUS, UNSPECIFIED LATERALITY: ICD-10-CM

## 2018-03-13 RX ORDER — DESVENLAFAXINE SUCCINATE 50 MG/1
50 TABLET, EXTENDED RELEASE ORAL DAILY
Qty: 30 TABLET | Refills: 0 | Status: SHIPPED | OUTPATIENT
Start: 2018-03-13 | End: 2018-04-10 | Stop reason: SDUPTHER

## 2018-03-14 ENCOUNTER — PATIENT MESSAGE (OUTPATIENT)
Dept: HEMATOLOGY/ONCOLOGY | Facility: CLINIC | Age: 28
End: 2018-03-14

## 2018-03-19 ENCOUNTER — PATIENT MESSAGE (OUTPATIENT)
Dept: SURGERY | Facility: CLINIC | Age: 28
End: 2018-03-19

## 2018-03-20 DIAGNOSIS — C50.911 MALIGNANT NEOPLASM OF RIGHT FEMALE BREAST, UNSPECIFIED ESTROGEN RECEPTOR STATUS, UNSPECIFIED SITE OF BREAST: Primary | ICD-10-CM

## 2018-03-27 ENCOUNTER — PATIENT MESSAGE (OUTPATIENT)
Dept: HEMATOLOGY/ONCOLOGY | Facility: CLINIC | Age: 28
End: 2018-03-27

## 2018-03-28 NOTE — TELEPHONE ENCOUNTER
Pt has dermatologist.   Referral not needed. Pt just wants to know if you're okay with her getting mole biopsied/

## 2018-03-28 NOTE — TELEPHONE ENCOUNTER
See mychart message  Please advise    Pt has a mole that she would like to have biopsied. She's going to send name of dermatologist near her so referral can be placed.

## 2018-03-29 ENCOUNTER — PATIENT MESSAGE (OUTPATIENT)
Dept: HEMATOLOGY/ONCOLOGY | Facility: CLINIC | Age: 28
End: 2018-03-29

## 2018-03-29 NOTE — TELEPHONE ENCOUNTER
I know you are ok with it being biopsied, however are you ok if she misses the dose of medication? Please see her attached message.

## 2018-04-10 ENCOUNTER — PATIENT MESSAGE (OUTPATIENT)
Dept: HEMATOLOGY/ONCOLOGY | Facility: CLINIC | Age: 28
End: 2018-04-10

## 2018-04-10 DIAGNOSIS — C50.619 MALIGNANT NEOPLASM OF AXILLARY TAIL OF FEMALE BREAST, UNSPECIFIED ESTROGEN RECEPTOR STATUS, UNSPECIFIED LATERALITY: ICD-10-CM

## 2018-04-10 RX ORDER — DESVENLAFAXINE SUCCINATE 50 MG/1
50 TABLET, EXTENDED RELEASE ORAL DAILY
Qty: 30 TABLET | Refills: 5 | Status: SHIPPED | OUTPATIENT
Start: 2018-04-10 | End: 2018-05-09 | Stop reason: SDUPTHER

## 2018-04-13 ENCOUNTER — OFFICE VISIT (OUTPATIENT)
Dept: HEMATOLOGY/ONCOLOGY | Facility: CLINIC | Age: 28
End: 2018-04-13
Payer: COMMERCIAL

## 2018-04-13 ENCOUNTER — TELEPHONE (OUTPATIENT)
Dept: HEMATOLOGY/ONCOLOGY | Facility: CLINIC | Age: 28
End: 2018-04-13

## 2018-04-13 ENCOUNTER — PATIENT MESSAGE (OUTPATIENT)
Dept: SURGERY | Facility: CLINIC | Age: 28
End: 2018-04-13

## 2018-04-13 ENCOUNTER — TELEPHONE (OUTPATIENT)
Dept: INFUSION THERAPY | Facility: HOSPITAL | Age: 28
End: 2018-04-13

## 2018-04-13 ENCOUNTER — INFUSION (OUTPATIENT)
Dept: INFUSION THERAPY | Facility: HOSPITAL | Age: 28
End: 2018-04-13
Attending: INTERNAL MEDICINE
Payer: COMMERCIAL

## 2018-04-13 VITALS
HEIGHT: 63 IN | TEMPERATURE: 99 F | SYSTOLIC BLOOD PRESSURE: 107 MMHG | DIASTOLIC BLOOD PRESSURE: 56 MMHG | HEART RATE: 82 BPM | RESPIRATION RATE: 20 BRPM | WEIGHT: 153.44 LBS | BODY MASS INDEX: 27.19 KG/M2

## 2018-04-13 DIAGNOSIS — C50.212 MALIGNANT NEOPLASM OF UPPER-INNER QUADRANT OF LEFT FEMALE BREAST, UNSPECIFIED ESTROGEN RECEPTOR STATUS: Primary | ICD-10-CM

## 2018-04-13 DIAGNOSIS — D64.81 ANTINEOPLASTIC CHEMOTHERAPY INDUCED ANEMIA: ICD-10-CM

## 2018-04-13 DIAGNOSIS — T45.1X5A CHEMOTHERAPY-INDUCED NEUTROPENIA: ICD-10-CM

## 2018-04-13 DIAGNOSIS — T45.1X5A ANTINEOPLASTIC CHEMOTHERAPY INDUCED ANEMIA: ICD-10-CM

## 2018-04-13 DIAGNOSIS — C50.619 MALIGNANT NEOPLASM OF AXILLARY TAIL OF FEMALE BREAST, UNSPECIFIED ESTROGEN RECEPTOR STATUS, UNSPECIFIED LATERALITY: Primary | ICD-10-CM

## 2018-04-13 DIAGNOSIS — C77.9 REGIONAL LYMPH NODE METASTASIS PRESENT: ICD-10-CM

## 2018-04-13 DIAGNOSIS — C50.212 MALIGNANT NEOPLASM OF UPPER-INNER QUADRANT OF LEFT BREAST IN FEMALE, ESTROGEN RECEPTOR POSITIVE: ICD-10-CM

## 2018-04-13 DIAGNOSIS — D69.59 CHEMOTHERAPY-INDUCED THROMBOCYTOPENIA: ICD-10-CM

## 2018-04-13 DIAGNOSIS — R91.1 PULMONARY NODULE: ICD-10-CM

## 2018-04-13 DIAGNOSIS — C50.011 MALIGNANT NEOPLASM OF NIPPLE OF RIGHT BREAST IN FEMALE, UNSPECIFIED ESTROGEN RECEPTOR STATUS: ICD-10-CM

## 2018-04-13 DIAGNOSIS — T45.1X5A CHEMOTHERAPY-INDUCED THROMBOCYTOPENIA: ICD-10-CM

## 2018-04-13 DIAGNOSIS — D70.1 CHEMOTHERAPY-INDUCED NEUTROPENIA: ICD-10-CM

## 2018-04-13 DIAGNOSIS — N91.1 AMENORRHEA, SECONDARY: ICD-10-CM

## 2018-04-13 DIAGNOSIS — Z17.0 MALIGNANT NEOPLASM OF UPPER-INNER QUADRANT OF LEFT BREAST IN FEMALE, ESTROGEN RECEPTOR POSITIVE: ICD-10-CM

## 2018-04-13 LAB
ALBUMIN SERPL BCP-MCNC: 4 G/DL
ALP SERPL-CCNC: 63 U/L
ALT SERPL W/O P-5'-P-CCNC: 40 U/L
ANION GAP SERPL CALC-SCNC: 7 MMOL/L
AST SERPL-CCNC: 38 U/L
BASOPHILS # BLD AUTO: 0.01 K/UL
BASOPHILS NFR BLD: 0.3 %
BILIRUB SERPL-MCNC: 0.6 MG/DL
BUN SERPL-MCNC: 9 MG/DL
CALCIUM SERPL-MCNC: 8.9 MG/DL
CHLORIDE SERPL-SCNC: 105 MMOL/L
CO2 SERPL-SCNC: 28 MMOL/L
CREAT SERPL-MCNC: 0.55 MG/DL
DIFFERENTIAL METHOD: ABNORMAL
EOSINOPHIL # BLD AUTO: 0.1 K/UL
EOSINOPHIL NFR BLD: 2 %
ERYTHROCYTE [DISTWIDTH] IN BLOOD BY AUTOMATED COUNT: 15.9 %
EST. GFR  (AFRICAN AMERICAN): >60 ML/MIN/1.73 M^2
EST. GFR  (NON AFRICAN AMERICAN): >60 ML/MIN/1.73 M^2
GLUCOSE SERPL-MCNC: 98 MG/DL
HCT VFR BLD AUTO: 30.4 %
HGB BLD-MCNC: 10.6 G/DL
LDH SERPL L TO P-CCNC: 569 U/L
LYMPHOCYTES # BLD AUTO: 0.7 K/UL
LYMPHOCYTES NFR BLD: 23.9 %
MAGNESIUM SERPL-MCNC: 1.9 MG/DL
MCH RBC QN AUTO: 31.9 PG
MCHC RBC AUTO-ENTMCNC: 34.9 G/DL
MCV RBC AUTO: 92 FL
MONOCYTES # BLD AUTO: 0.4 K/UL
MONOCYTES NFR BLD: 14 %
NEUTROPHILS # BLD AUTO: 1.8 K/UL
NEUTROPHILS NFR BLD: 59.8 %
NRBC BLD-RTO: 0 /100 WBC
PLATELET # BLD AUTO: 149 K/UL
PMV BLD AUTO: 9.6 FL
POTASSIUM SERPL-SCNC: 4 MMOL/L
PROT SERPL-MCNC: 6.7 G/DL
RBC # BLD AUTO: 3.32 M/UL
SODIUM SERPL-SCNC: 140 MMOL/L
WBC # BLD AUTO: 2.93 K/UL

## 2018-04-13 PROCEDURE — 83735 ASSAY OF MAGNESIUM: CPT

## 2018-04-13 PROCEDURE — 83615 LACTATE (LD) (LDH) ENZYME: CPT | Mod: PN

## 2018-04-13 PROCEDURE — 85025 COMPLETE CBC W/AUTO DIFF WBC: CPT | Mod: PN

## 2018-04-13 PROCEDURE — 83615 LACTATE (LD) (LDH) ENZYME: CPT

## 2018-04-13 PROCEDURE — 80053 COMPREHEN METABOLIC PANEL: CPT

## 2018-04-13 PROCEDURE — 99214 OFFICE O/P EST MOD 30 MIN: CPT | Mod: S$GLB,,, | Performed by: INTERNAL MEDICINE

## 2018-04-13 PROCEDURE — 36591 DRAW BLOOD OFF VENOUS DEVICE: CPT | Mod: PN

## 2018-04-13 PROCEDURE — 80053 COMPREHEN METABOLIC PANEL: CPT | Mod: PN

## 2018-04-13 PROCEDURE — 99999 PR PBB SHADOW E&M-EST. PATIENT-LVL III: CPT | Mod: PBBFAC,,, | Performed by: INTERNAL MEDICINE

## 2018-04-13 PROCEDURE — 63600175 PHARM REV CODE 636 W HCPCS: Mod: PN | Performed by: INTERNAL MEDICINE

## 2018-04-13 PROCEDURE — 83735 ASSAY OF MAGNESIUM: CPT | Mod: PN

## 2018-04-13 PROCEDURE — 85025 COMPLETE CBC W/AUTO DIFF WBC: CPT

## 2018-04-13 PROCEDURE — 25000003 PHARM REV CODE 250: Mod: PN | Performed by: INTERNAL MEDICINE

## 2018-04-13 PROCEDURE — A4216 STERILE WATER/SALINE, 10 ML: HCPCS | Mod: PN | Performed by: INTERNAL MEDICINE

## 2018-04-13 RX ORDER — VENLAFAXINE 37.5 MG/1
TABLET ORAL DAILY
COMMUNITY
End: 2018-04-25 | Stop reason: SDUPTHER

## 2018-04-13 RX ORDER — SODIUM CHLORIDE 0.9 % (FLUSH) 0.9 %
10 SYRINGE (ML) INJECTION
Status: COMPLETED | OUTPATIENT
Start: 2018-04-13 | End: 2018-04-13

## 2018-04-13 RX ORDER — HEPARIN 100 UNIT/ML
500 SYRINGE INTRAVENOUS
Status: CANCELLED | OUTPATIENT
Start: 2018-04-13

## 2018-04-13 RX ORDER — SODIUM CHLORIDE 0.9 % (FLUSH) 0.9 %
10 SYRINGE (ML) INJECTION
Status: CANCELLED | OUTPATIENT
Start: 2018-04-13

## 2018-04-13 RX ORDER — HEPARIN 100 UNIT/ML
500 SYRINGE INTRAVENOUS
Status: COMPLETED | OUTPATIENT
Start: 2018-04-13 | End: 2018-04-13

## 2018-04-13 RX ADMIN — Medication 10 ML: at 10:04

## 2018-04-13 RX ADMIN — HEPARIN SODIUM (PORCINE) LOCK FLUSH IV SOLN 100 UNIT/ML 500 UNITS: 100 SOLUTION at 10:04

## 2018-04-13 NOTE — TELEPHONE ENCOUNTER
Appointment was adjust   Please reschedule pt's port flush/labs césar prior to Dr. Gaming's appointment on 5/11. Dr. Gaming is okay w/this being at 4 weeks as [place]. Labs have already been linked to appointment.

## 2018-04-13 NOTE — PROGRESS NOTES
HISTORY OF PRESENT ILLNESS:  A 27-year-old white female well known to me for   locally advanced left breast carcinoma, which is ER positive, TN negative, and    HER-2/raeann negative.  She underwent neoadjuvant therapy consisting of four cycles   of Adriamycin/Cytoxan followed by four cycles of Taxotere.  She went on to have   bilateral skin sparing mastectomies and tissue expander reconstruction.  She   completed postoperative external beam radiation therapy.  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  Thus, she has embarked upon six   months of adjuvant therapy with Xeloda.  The patient tolerated the first cycle   well with the exception of diffuse bruising, which developed during her off week   and appears to be diminishing now.  No nausea, vomiting, fevers, chills,   uncontrolled diarrhea, mouth sores, hand-foot syndrome, etc.  The patient does   remain amenorrheic.  No other pertinent findings on a 14-point review of   systems.    PHYSICAL EXAMINATION:  GENERAL:  Well-developed, well-nourished white female in no acute distress.  VITAL SIGNS:  Weight is 153-1/2 pounds (decreased by 4 pounds).  HEENT:  Normocephalic, atraumatic.  Oral mucosa pink and moist.  Lips without   lesions.  Tongue midline.  Oropharynx clear.  Nonicteric sclerae.   NECK:  Supple.  No adenopathy.                                               HEART:  Regular rate and rhythm without murmur, gallop or rub.               LUNGS:  Clear to auscultation bilaterally.                                   ABDOMEN:  Soft, nontender and nondistended with positive normoactive bowel   sounds.  No hepatosplenomegaly.                                              EXTREMITIES:  No cyanosis, clubbing or edema.  Distal pulses are intact.       LABORATORY:  White count 2900, H and H 10.6 and 30.4, platelets 149, ANC 1800.    Sodium 140, potassium 4, chloride 105, CO2 28, BUN 9, creatinine 0.6, glucose   98, calcium 8.9, mag 1.9, AST  mildly elevated at 38.  Liver function tests   otherwise within normal limits.  .  GFR is greater than 60.    IMPRESSION:  1.  Locally advanced left breast carcinoma with suboptimal response to   neoadjuvant chemotherapy.  2.  Therapy-associated amenorrhea.  3.  Therapy-associated leukopenia.  4.  Therapy-associated anemia.  5.  Treatment-associated thrombocytopenia -- quite mild and not in need of   intervention.    PLAN:  1.  Proceed with second cycle of adjuvant Xeloda to consist of 1800 mg p.o.   b.i.d. days 1 through 14.  2.  Return to clinic in 3-4 weeks with interval CBC, CMP, LDH, and mag prior to   appointment.  3.  Should patient experience recurrent difficulties with bruising, she is to   get a CBC performed at the health facility nearest her new home in [place] and   report results at earliest convenience.      CHRIS/HN  dd: 04/13/2018 12:22:19 (CDT)  td: 04/14/2018 04:25:32 (CDT)  Doc ID   #[id number]  Job ID #[id number]    CC:

## 2018-04-13 NOTE — TELEPHONE ENCOUNTER
Please reschedule pt's port flush/labs césar prior to Dr. Gaming's appointment on 5/11. Dr. Gaming is okay w/this being at 4 weeks as [place]. Labs have already been linked to appointment.

## 2018-04-24 ENCOUNTER — PATIENT MESSAGE (OUTPATIENT)
Dept: HEMATOLOGY/ONCOLOGY | Facility: CLINIC | Age: 28
End: 2018-04-24

## 2018-04-25 DIAGNOSIS — C50.011 MALIGNANT NEOPLASM OF NIPPLE OF RIGHT BREAST IN FEMALE, UNSPECIFIED ESTROGEN RECEPTOR STATUS: Primary | ICD-10-CM

## 2018-04-25 RX ORDER — VENLAFAXINE 37.5 MG/1
37.5 TABLET ORAL DAILY
Qty: 90 TABLET | Refills: 3 | Status: SHIPPED | OUTPATIENT
Start: 2018-04-25 | End: 2019-04-30 | Stop reason: SDUPTHER

## 2018-04-27 DIAGNOSIS — F41.9 ANXIETY: ICD-10-CM

## 2018-04-27 RX ORDER — ALPRAZOLAM 0.25 MG/1
0.25 TABLET ORAL 3 TIMES DAILY PRN
Qty: 90 TABLET | Refills: 3 | Status: SHIPPED | OUTPATIENT
Start: 2018-04-27 | End: 2022-07-20

## 2018-05-09 DIAGNOSIS — C50.619 MALIGNANT NEOPLASM OF AXILLARY TAIL OF FEMALE BREAST, UNSPECIFIED ESTROGEN RECEPTOR STATUS, UNSPECIFIED LATERALITY: ICD-10-CM

## 2018-05-10 RX ORDER — DESVENLAFAXINE SUCCINATE 50 MG/1
50 TABLET, EXTENDED RELEASE ORAL DAILY
Qty: 30 TABLET | Refills: 5 | Status: SHIPPED | OUTPATIENT
Start: 2018-05-10 | End: 2021-01-29 | Stop reason: ALTCHOICE

## 2018-05-11 ENCOUNTER — INFUSION (OUTPATIENT)
Dept: INFUSION THERAPY | Facility: HOSPITAL | Age: 28
End: 2018-05-11
Attending: INTERNAL MEDICINE
Payer: COMMERCIAL

## 2018-05-11 ENCOUNTER — OFFICE VISIT (OUTPATIENT)
Dept: HEMATOLOGY/ONCOLOGY | Facility: CLINIC | Age: 28
End: 2018-05-11
Payer: COMMERCIAL

## 2018-05-11 VITALS
SYSTOLIC BLOOD PRESSURE: 109 MMHG | HEART RATE: 94 BPM | WEIGHT: 158.06 LBS | BODY MASS INDEX: 28 KG/M2 | DIASTOLIC BLOOD PRESSURE: 67 MMHG | TEMPERATURE: 98 F | HEIGHT: 63 IN | RESPIRATION RATE: 20 BRPM

## 2018-05-11 DIAGNOSIS — T45.1X5A CHEMOTHERAPY-INDUCED NEUTROPENIA: ICD-10-CM

## 2018-05-11 DIAGNOSIS — D69.59 CHEMOTHERAPY-INDUCED THROMBOCYTOPENIA: ICD-10-CM

## 2018-05-11 DIAGNOSIS — T45.1X5A CHEMOTHERAPY-INDUCED THROMBOCYTOPENIA: ICD-10-CM

## 2018-05-11 DIAGNOSIS — D64.81 ANTINEOPLASTIC CHEMOTHERAPY INDUCED ANEMIA: ICD-10-CM

## 2018-05-11 DIAGNOSIS — D70.1 CHEMOTHERAPY-INDUCED NEUTROPENIA: ICD-10-CM

## 2018-05-11 DIAGNOSIS — N91.1 AMENORRHEA, SECONDARY: ICD-10-CM

## 2018-05-11 DIAGNOSIS — C77.9 REGIONAL LYMPH NODE METASTASIS PRESENT: ICD-10-CM

## 2018-05-11 DIAGNOSIS — T45.1X5A ANTINEOPLASTIC CHEMOTHERAPY INDUCED ANEMIA: ICD-10-CM

## 2018-05-11 DIAGNOSIS — C50.619 MALIGNANT NEOPLASM OF AXILLARY TAIL OF FEMALE BREAST, UNSPECIFIED ESTROGEN RECEPTOR STATUS, UNSPECIFIED LATERALITY: Primary | ICD-10-CM

## 2018-05-11 DIAGNOSIS — C50.011 MALIGNANT NEOPLASM OF NIPPLE OF RIGHT BREAST IN FEMALE, UNSPECIFIED ESTROGEN RECEPTOR STATUS: ICD-10-CM

## 2018-05-11 PROCEDURE — 25000003 PHARM REV CODE 250: Mod: PN | Performed by: INTERNAL MEDICINE

## 2018-05-11 PROCEDURE — 99214 OFFICE O/P EST MOD 30 MIN: CPT | Mod: S$GLB,,, | Performed by: INTERNAL MEDICINE

## 2018-05-11 PROCEDURE — A4216 STERILE WATER/SALINE, 10 ML: HCPCS | Mod: PN | Performed by: INTERNAL MEDICINE

## 2018-05-11 PROCEDURE — 96523 IRRIG DRUG DELIVERY DEVICE: CPT | Mod: PN

## 2018-05-11 PROCEDURE — 99999 PR PBB SHADOW E&M-EST. PATIENT-LVL III: CPT | Mod: PBBFAC,,, | Performed by: INTERNAL MEDICINE

## 2018-05-11 PROCEDURE — 63600175 PHARM REV CODE 636 W HCPCS: Mod: PN | Performed by: INTERNAL MEDICINE

## 2018-05-11 RX ORDER — HEPARIN 100 UNIT/ML
500 SYRINGE INTRAVENOUS
Status: CANCELLED | OUTPATIENT
Start: 2018-05-11

## 2018-05-11 RX ORDER — SODIUM CHLORIDE 0.9 % (FLUSH) 0.9 %
10 SYRINGE (ML) INJECTION
Status: COMPLETED | OUTPATIENT
Start: 2018-05-11 | End: 2018-05-11

## 2018-05-11 RX ORDER — HEPARIN 100 UNIT/ML
500 SYRINGE INTRAVENOUS
Status: COMPLETED | OUTPATIENT
Start: 2018-05-11 | End: 2018-05-11

## 2018-05-11 RX ORDER — SODIUM CHLORIDE 0.9 % (FLUSH) 0.9 %
10 SYRINGE (ML) INJECTION
Status: CANCELLED | OUTPATIENT
Start: 2018-05-11

## 2018-05-11 RX ADMIN — SODIUM CHLORIDE, PRESERVATIVE FREE 10 ML: 5 INJECTION INTRAVENOUS at 03:05

## 2018-05-11 RX ADMIN — HEPARIN 500 UNITS: 100 SYRINGE at 03:05

## 2018-05-11 NOTE — PROGRESS NOTES
HISTORY OF PRESENT ILLNESS:  This 27-year-old white female well known to me for   locally advanced left breast carcinoma, which is ER positive, ME negative, and    HER-2/raeann negative.  The patient underwent neoadjuvant therapy consisting of   four cycles of Adriamycin/Cytoxan followed by four cycles of Taxotere.  She went   on to have bilateral skin sparing mastectomies and tissue expander   reconstruction.  She completed postoperative external beam radiation therapy.    Unfortunately, at the time of resection the patient was found to have residual   saritha and breast carcinoma.  She is currently receiving adjuvant Xeloda and is   about to begin fourth of a planned six cycles of therapy.  No other pertinent   findings on a 14-point review of systems.    PHYSICAL EXAMINATION:  GENERAL:  Well-developed, well-nourished white female in no acute distress.  VITAL SIGNS:  Weight 158 pounds (increased by 4.5 pounds).  HEENT:  Normocephalic, atraumatic.  Oral mucosa pink and moist.  Lips without   lesions.  Tongue midline.  Oropharynx clear.  Nonicteric sclerae.   NECK:  Supple, no adenopathy.  No carotid bruits, thyromegaly or thyroid nodule.                                                                        HEART:  Regular rate and rhythm without murmur, gallop or rub.                LUNGS:  Clear to auscultation bilaterally.  Normal respiratory effort.       ABDOMEN:  Soft, nontender, nondistended with positive normoactive bowel sounds,   no hepatosplenomegaly.    EXTREMITIES:  No cyanosis, clubbing or edema.  Distal pulses are intact.                                              AXILLAE AND GROIN:  No palpable pathologic lymphadenopathy is appreciated.        SKIN:  Intact/turgor normal                                                              NEUROLOGIC:  Cranial nerves II-XII grossly intact.  Motor:  Good muscle bulk and   tone.  Strength/sensory 5/5 throughout.  Gait stable.     LABORATORY:  White count 4.2, H  and H 10.8 and 30.4, and platelet count 166.    Chemistry:  Sodium 138, potassium 4.1, chloride 102, CO2 28, BUN of 10,   creatinine 0.6, glucose 96, calcium 9, mag 1.8, AST 37, ALT 48.  GFR greater   than 60.    IMPRESSION:  1.  Locally advanced left breast carcinoma with suboptimal response to   neoadjuvant therapy.  2.  Treatment-associated amenorrhea.   3.  Mild treatment associated anemia of which the patient is asymptomatic.    PLAN:  1.  Proceed with fourth cycle of Xeloda to consist of 1800 mg p.o. b.i.d. days 1   through 14 of 21-day cycle.  2.  Reevaluate three weeks from now with interval CBC, CMP, LDH, and mag.      CHRIS/HN  dd: 05/11/2018 15:38:09 (CDT)  td: 05/12/2018 08:56:52 (CDT)  Doc ID   #[id number]  Job ID #[id number]    CC:

## 2018-05-14 ENCOUNTER — PATIENT MESSAGE (OUTPATIENT)
Dept: HEMATOLOGY/ONCOLOGY | Facility: CLINIC | Age: 28
End: 2018-05-14

## 2018-06-07 ENCOUNTER — PATIENT MESSAGE (OUTPATIENT)
Dept: SURGERY | Facility: CLINIC | Age: 28
End: 2018-06-07

## 2018-06-07 ENCOUNTER — OFFICE VISIT (OUTPATIENT)
Dept: SURGERY | Facility: CLINIC | Age: 28
End: 2018-06-07
Attending: SURGERY
Payer: COMMERCIAL

## 2018-06-07 ENCOUNTER — LAB VISIT (OUTPATIENT)
Dept: LAB | Facility: HOSPITAL | Age: 28
End: 2018-06-07
Attending: INTERNAL MEDICINE
Payer: COMMERCIAL

## 2018-06-07 VITALS
SYSTOLIC BLOOD PRESSURE: 110 MMHG | DIASTOLIC BLOOD PRESSURE: 72 MMHG | HEART RATE: 99 BPM | TEMPERATURE: 99 F | BODY MASS INDEX: 29.08 KG/M2 | WEIGHT: 158 LBS | HEIGHT: 62 IN

## 2018-06-07 DIAGNOSIS — N91.1 AMENORRHEA, SECONDARY: ICD-10-CM

## 2018-06-07 DIAGNOSIS — C50.619 MALIGNANT NEOPLASM OF AXILLARY TAIL OF FEMALE BREAST, UNSPECIFIED ESTROGEN RECEPTOR STATUS, UNSPECIFIED LATERALITY: ICD-10-CM

## 2018-06-07 DIAGNOSIS — C50.911 MALIGNANT NEOPLASM OF RIGHT FEMALE BREAST, UNSPECIFIED ESTROGEN RECEPTOR STATUS, UNSPECIFIED SITE OF BREAST: Primary | ICD-10-CM

## 2018-06-07 DIAGNOSIS — D64.81 ANTINEOPLASTIC CHEMOTHERAPY INDUCED ANEMIA: ICD-10-CM

## 2018-06-07 DIAGNOSIS — T45.1X5A ANTINEOPLASTIC CHEMOTHERAPY INDUCED ANEMIA: ICD-10-CM

## 2018-06-07 DIAGNOSIS — C77.9 REGIONAL LYMPH NODE METASTASIS PRESENT: ICD-10-CM

## 2018-06-07 LAB
ALBUMIN SERPL BCP-MCNC: 4.4 G/DL
ALP SERPL-CCNC: 90 U/L
ALT SERPL W/O P-5'-P-CCNC: 28 U/L
ANION GAP SERPL CALC-SCNC: 7 MMOL/L
AST SERPL-CCNC: 31 U/L
BASOPHILS # BLD AUTO: 0.02 K/UL
BASOPHILS NFR BLD: 0.3 %
BILIRUB SERPL-MCNC: 0.9 MG/DL
BUN SERPL-MCNC: 11 MG/DL
CALCIUM SERPL-MCNC: 9.5 MG/DL
CHLORIDE SERPL-SCNC: 106 MMOL/L
CO2 SERPL-SCNC: 28 MMOL/L
CREAT SERPL-MCNC: 0.8 MG/DL
DIFFERENTIAL METHOD: ABNORMAL
EOSINOPHIL # BLD AUTO: 0.2 K/UL
EOSINOPHIL NFR BLD: 2.4 %
ERYTHROCYTE [DISTWIDTH] IN BLOOD BY AUTOMATED COUNT: 14.9 %
EST. GFR  (AFRICAN AMERICAN): >60 ML/MIN/1.73 M^2
EST. GFR  (NON AFRICAN AMERICAN): >60 ML/MIN/1.73 M^2
GLUCOSE SERPL-MCNC: 124 MG/DL
HCT VFR BLD AUTO: 33.8 %
HGB BLD-MCNC: 12.2 G/DL
LDH SERPL L TO P-CCNC: 207 U/L
LYMPHOCYTES # BLD AUTO: 1.1 K/UL
LYMPHOCYTES NFR BLD: 18 %
MAGNESIUM SERPL-MCNC: 2.1 MG/DL
MCH RBC QN AUTO: 35.3 PG
MCHC RBC AUTO-ENTMCNC: 36.1 G/DL
MCV RBC AUTO: 98 FL
MONOCYTES # BLD AUTO: 0.6 K/UL
MONOCYTES NFR BLD: 9.8 %
NEUTROPHILS # BLD AUTO: 4.3 K/UL
NEUTROPHILS NFR BLD: 69.3 %
PLATELET # BLD AUTO: 196 K/UL
PMV BLD AUTO: 8.3 FL
POTASSIUM SERPL-SCNC: 4.2 MMOL/L
PROT SERPL-MCNC: 7.2 G/DL
RBC # BLD AUTO: 3.46 M/UL
SODIUM SERPL-SCNC: 141 MMOL/L
WBC # BLD AUTO: 6.23 K/UL

## 2018-06-07 PROCEDURE — 99999 PR PBB SHADOW E&M-EST. PATIENT-LVL III: CPT | Mod: PBBFAC,,, | Performed by: SURGERY

## 2018-06-07 PROCEDURE — 83735 ASSAY OF MAGNESIUM: CPT

## 2018-06-07 PROCEDURE — 85025 COMPLETE CBC W/AUTO DIFF WBC: CPT

## 2018-06-07 PROCEDURE — 80053 COMPREHEN METABOLIC PANEL: CPT

## 2018-06-07 PROCEDURE — 36415 COLL VENOUS BLD VENIPUNCTURE: CPT

## 2018-06-07 PROCEDURE — 99213 OFFICE O/P EST LOW 20 MIN: CPT | Mod: S$GLB,,, | Performed by: SURGERY

## 2018-06-07 PROCEDURE — 83615 LACTATE (LD) (LDH) ENZYME: CPT

## 2018-06-07 NOTE — PROGRESS NOTES
REFERRING PHYSICIAN:  No referring provider defined for this encounter.       Brenda Jaimes MD      DIAGNOSIS:    This is a 27 y.o. female with a stage IIB pT2 N1 M0 grade 2 ER + DC + HER2 negative of the left breast.    TREATMENT SUMMARY:  The patient is status post bilateral mastectomies and sentinel node biopsy on the left.  Final pathology showed invasive cancer 1.4cm with surrounding DCIS of 1.5cm and one lymph node with micrometastasis (0.3mm) on the left side. There were 4 additional lymph nodes negative for cancer. She was treated with 4 cycles of neoadjuvant Taxotere and four cycles of adjuvant Adriamycin/Cytoxan. Repeat PET scan showing resolution of breast mass and decreased size in left axillary node prior to having surgery.    INTERVAL HISTORY:   Berto Chun comes in for a post-op check.  She denies fever, chills, chest pain or shortness of breath.  Her pain is well controlled.    She occasionally has pain on the left lateral chest wall near the drain exit sites.  She has complete radiation therapy and chemotherapy.  SHe currently is on Xeloda for maintainace.  Her left breast has hardened as expected as a result of radiation    MEDICATIONS:  Current Outpatient Prescriptions   Medication Sig Dispense Refill    ALPRAZolam (XANAX) 0.25 MG tablet Take 1 tablet (0.25 mg total) by mouth 3 (three) times daily as needed for Anxiety. 90 tablet 3    biotin 1 mg Cap Take by mouth.      bisacodyl (DULCOLAX) 5 mg EC tablet Take 5 mg by mouth daily as needed for Constipation.      capecitabine (XELODA) 150 MG tablet TAKE 1800MG (3-500MG + 2-150MG) BY MOUTH TWICE DAILY FOR 14 DAYS, THENOFF FOR 7 DAYS. TAKE WITHIN 30 MINUTES AFTER MEALS WITH WATER. 56 tablet 5    capecitabine (XELODA) 500 MG Tab TAKE 1800MG (3-500MG + 2-150MG) BY MOUTH TWICE DAILY FOR 14 DAYS, THENOFF FOR 7 DAYS. TAKE WITHIN 30 MINUTES AFTER MEALS WITH WATER. 84 tablet 5    desvenlafaxine succinate (PRISTIQ) 50 MG Tb24 Take 1 tablet  (50 mg total) by mouth once daily. 30 tablet 5    ondansetron (ZOFRAN-ODT) 8 MG TbDL       PNV#75/IRON FUM/FA/OM3/DHA/EPA (ONE A DAY WOMEN'S PRENATAL DHA ORAL) Take 1 tablet by mouth once daily.      prochlorperazine (COMPAZINE) 10 MG tablet TK 1 T PO Q 6 H PRF NV  6    venlafaxine (EFFEXOR) 37.5 MG Tab Take 1 tablet (37.5 mg total) by mouth once daily. 90 tablet 3    water Liqd 150 mL with MILK OF MAGNESIA 400 mg/5 mL Susp 400 mg, diphenhydrAMINE 12.5 mg/5 mL Elix 60 mg, nystatin 100,000 unit/mL Susp 500,000 Units SWISH AND SWALLOW 5ML PO Q 4 H PRF MOUTH/THROAT PAIN  6    clindamycin-tretinoin (ZIANA) gel Apply a thin coat to the affected area every night.       No current facility-administered medications for this visit.        ALLERGIES:   Review of patient's allergies indicates:  No Known Allergies    PHYSICAL EXAMINATION:   General:  This is a well appearing female with appropriate speech, affect and gait.     Breast:  All incisions have healed well.  The left breast is much harder related to post radiation changes.  There are no suspicious masses or skin changes on exam    IMPRESSION:   The patient has had an uneventful postoperative course.  She has completed radiation and chemo and is on Xeloda for maintenance.      PLAN:   Will plan on removal of port in the office at next appt if cleared by oncology  Will discuss with oncology Xeloda vs Tamoxifen.  Seems reasonable approach for xeloda due to residual disease.  Will see back for CBE

## 2018-06-08 ENCOUNTER — PATIENT MESSAGE (OUTPATIENT)
Dept: HEMATOLOGY/ONCOLOGY | Facility: CLINIC | Age: 28
End: 2018-06-08

## 2018-06-08 ENCOUNTER — INFUSION (OUTPATIENT)
Dept: INFUSION THERAPY | Facility: HOSPITAL | Age: 28
End: 2018-06-08
Attending: INTERNAL MEDICINE
Payer: COMMERCIAL

## 2018-06-08 ENCOUNTER — OFFICE VISIT (OUTPATIENT)
Dept: HEMATOLOGY/ONCOLOGY | Facility: CLINIC | Age: 28
End: 2018-06-08
Payer: COMMERCIAL

## 2018-06-08 VITALS
BODY MASS INDEX: 28.52 KG/M2 | HEART RATE: 101 BPM | SYSTOLIC BLOOD PRESSURE: 112 MMHG | RESPIRATION RATE: 20 BRPM | TEMPERATURE: 99 F | WEIGHT: 160.94 LBS | HEIGHT: 63 IN | DIASTOLIC BLOOD PRESSURE: 64 MMHG

## 2018-06-08 DIAGNOSIS — C50.619 MALIGNANT NEOPLASM OF AXILLARY TAIL OF FEMALE BREAST, UNSPECIFIED ESTROGEN RECEPTOR STATUS, UNSPECIFIED LATERALITY: Primary | ICD-10-CM

## 2018-06-08 DIAGNOSIS — C50.011 MALIGNANT NEOPLASM OF NIPPLE OF RIGHT BREAST IN FEMALE, UNSPECIFIED ESTROGEN RECEPTOR STATUS: Primary | ICD-10-CM

## 2018-06-08 DIAGNOSIS — D70.1 CHEMOTHERAPY-INDUCED NEUTROPENIA: ICD-10-CM

## 2018-06-08 DIAGNOSIS — N91.1 AMENORRHEA, SECONDARY: ICD-10-CM

## 2018-06-08 DIAGNOSIS — C77.9 REGIONAL LYMPH NODE METASTASIS PRESENT: ICD-10-CM

## 2018-06-08 DIAGNOSIS — N91.2 AMENORRHEA: ICD-10-CM

## 2018-06-08 DIAGNOSIS — C77.9 MALIGNANT NEOPLASM METASTATIC TO LYMPH NODES, UNSPECIFIED LYMPH NODE REGION: ICD-10-CM

## 2018-06-08 DIAGNOSIS — T45.1X5A CHEMOTHERAPY-INDUCED NEUTROPENIA: ICD-10-CM

## 2018-06-08 PROCEDURE — 99999 PR PBB SHADOW E&M-EST. PATIENT-LVL III: CPT | Mod: PBBFAC,,, | Performed by: INTERNAL MEDICINE

## 2018-06-08 PROCEDURE — 25000003 PHARM REV CODE 250: Mod: PN | Performed by: INTERNAL MEDICINE

## 2018-06-08 PROCEDURE — 63600175 PHARM REV CODE 636 W HCPCS: Mod: PN | Performed by: INTERNAL MEDICINE

## 2018-06-08 PROCEDURE — A4216 STERILE WATER/SALINE, 10 ML: HCPCS | Mod: PN | Performed by: INTERNAL MEDICINE

## 2018-06-08 PROCEDURE — 96523 IRRIG DRUG DELIVERY DEVICE: CPT | Mod: PN

## 2018-06-08 PROCEDURE — 99214 OFFICE O/P EST MOD 30 MIN: CPT | Mod: S$GLB,,, | Performed by: INTERNAL MEDICINE

## 2018-06-08 RX ORDER — SODIUM CHLORIDE 0.9 % (FLUSH) 0.9 %
10 SYRINGE (ML) INJECTION
Status: COMPLETED | OUTPATIENT
Start: 2018-06-08 | End: 2018-06-08

## 2018-06-08 RX ORDER — SODIUM CHLORIDE 0.9 % (FLUSH) 0.9 %
10 SYRINGE (ML) INJECTION
Status: CANCELLED | OUTPATIENT
Start: 2018-06-08

## 2018-06-08 RX ORDER — HEPARIN 100 UNIT/ML
500 SYRINGE INTRAVENOUS
Status: CANCELLED | OUTPATIENT
Start: 2018-06-08

## 2018-06-08 RX ORDER — HEPARIN 100 UNIT/ML
500 SYRINGE INTRAVENOUS
Status: COMPLETED | OUTPATIENT
Start: 2018-06-08 | End: 2018-06-08

## 2018-06-08 RX ADMIN — SODIUM CHLORIDE, PRESERVATIVE FREE 10 ML: 5 INJECTION INTRAVENOUS at 09:06

## 2018-06-08 RX ADMIN — HEPARIN 500 UNITS: 100 SYRINGE at 09:06

## 2018-06-08 NOTE — PROGRESS NOTES
HISTORY OF PRESENT ILLNESS:  A 27-year-old white female well known to me for   locally advanced left breast carcinoma, which is ER positive, [place] positive and    HER-2/raeann negative, who underwent neoadjuvant therapy consisting of four cycles   of Adriamycin/Cytoxan followed by four cycles of Taxotere.  She went on to have   bilateral skin-sparing mastectomies and tissue expander reconstruction.  The   patient completed postoperative adjuvant external beam radiation therapy.    Unfortunately, at the time of resection, she was found to have residual saritha   carcinoma as well as residual tumor within the breast.  She is currently   receiving adjuvant Xeloda and returns to clinic for evaluation prior to next   cycle of therapy.  She had menstrual blood loss last month, which persisted only   for one and a half days.  This is the first evidence of returning ovarian   function in a year.    No other pertinent findings on a 14-point review of systems.    PHYSICAL EXAMINATION:  GENERAL:  Well-developed, well-nourished white female in no acute distress.  VITAL SIGNS:  Weight 161 pounds.  Documented in EMR and reviewed.  HEENT:  Normocephalic, atraumatic.  Oral mucosa pink and moist.  Lips without   lesions.  Tongue midline.  Oropharynx clear.  Nonicteric sclerae.   NECK:  Supple, no adenopathy.  No carotid bruits, thyromegaly or thyroid nodule.  HEART:  Regular rate and rhythm without murmur, gallop or rub.                LUNGS:  Clear to auscultation bilaterally.  Normal respiratory effort.       ABDOMEN:  Soft, nontender, nondistended with positive normoactive bowel sounds,   no hepatosplenomegaly.    EXTREMITIES:  No cyanosis, clubbing or edema.  Distal pulses are intact.                                              AXILLAE AND GROIN:  No palpable pathologic lymphadenopathy is appreciated.        SKIN:  Intact/turgor normal.  NEUROLOGIC:  Cranial nerves II-XII grossly intact.  Motor:  Good muscle bulk and   tone.   Strength/sensory 5/5 throughout.  Gait stable.    LABORATORY:  White count 6.3, H and H 12.2 and 33.8, platelet count of 196.    Sodium 141, potassium 4.2, chloride 106, CO2 28, BUN 11, creatinine 0.8, glucose   124, calcium 9.5, mag 2.1.  Liver function tests within normal limits.  LDH   207.  GFR greater than 60.    IMPRESSION:  1.  Locally advanced left breast carcinoma with suboptimal response to   neoadjuvant chemotherapy.  2.  Resumption of menses.    PLAN:  Proceed with next cycle of Xeloda to consist of 1800 mg p.o. b.i.d. days   1 through 14 of the 21-day cycle, and return to clinic three weeks from now with   interval CBC, CMP, LDH, and mag.      CHRIS/HN  dd: 06/08/2018 09:22:02 (CDT)  td: 06/09/2018 01:35:56 (CDT)  Doc ID   #[id number]  Job ID #[id number]    CC:

## 2018-06-26 ENCOUNTER — PATIENT MESSAGE (OUTPATIENT)
Dept: HEMATOLOGY/ONCOLOGY | Facility: CLINIC | Age: 28
End: 2018-06-26

## 2018-06-28 ENCOUNTER — PATIENT MESSAGE (OUTPATIENT)
Dept: HEMATOLOGY/ONCOLOGY | Facility: CLINIC | Age: 28
End: 2018-06-28

## 2018-07-10 ENCOUNTER — PATIENT MESSAGE (OUTPATIENT)
Dept: HEMATOLOGY/ONCOLOGY | Facility: CLINIC | Age: 28
End: 2018-07-10

## 2018-07-11 ENCOUNTER — PATIENT MESSAGE (OUTPATIENT)
Dept: HEMATOLOGY/ONCOLOGY | Facility: CLINIC | Age: 28
End: 2018-07-11

## 2018-07-12 ENCOUNTER — LAB VISIT (OUTPATIENT)
Dept: LAB | Facility: HOSPITAL | Age: 28
End: 2018-07-12
Attending: INTERNAL MEDICINE
Payer: COMMERCIAL

## 2018-07-12 DIAGNOSIS — C77.9 MALIGNANT NEOPLASM METASTATIC TO LYMPH NODES, UNSPECIFIED LYMPH NODE REGION: ICD-10-CM

## 2018-07-12 DIAGNOSIS — T45.1X5A CHEMOTHERAPY-INDUCED NEUTROPENIA: ICD-10-CM

## 2018-07-12 DIAGNOSIS — D70.1 CHEMOTHERAPY-INDUCED NEUTROPENIA: ICD-10-CM

## 2018-07-12 DIAGNOSIS — C50.619 MALIGNANT NEOPLASM OF AXILLARY TAIL OF FEMALE BREAST, UNSPECIFIED ESTROGEN RECEPTOR STATUS, UNSPECIFIED LATERALITY: ICD-10-CM

## 2018-07-12 DIAGNOSIS — N91.2 AMENORRHEA: ICD-10-CM

## 2018-07-12 LAB
ALBUMIN SERPL BCP-MCNC: 4.5 G/DL
ALP SERPL-CCNC: 88 U/L
ALT SERPL W/O P-5'-P-CCNC: 17 U/L
ANION GAP SERPL CALC-SCNC: 8 MMOL/L
AST SERPL-CCNC: 21 U/L
BASOPHILS # BLD AUTO: 0.02 K/UL
BASOPHILS NFR BLD: 0.4 %
BILIRUB SERPL-MCNC: 1.3 MG/DL
BUN SERPL-MCNC: 20 MG/DL
CALCIUM SERPL-MCNC: 9.1 MG/DL
CHLORIDE SERPL-SCNC: 103 MMOL/L
CO2 SERPL-SCNC: 29 MMOL/L
CREAT SERPL-MCNC: 0.9 MG/DL
DIFFERENTIAL METHOD: ABNORMAL
EOSINOPHIL # BLD AUTO: 0.1 K/UL
EOSINOPHIL NFR BLD: 2.4 %
ERYTHROCYTE [DISTWIDTH] IN BLOOD BY AUTOMATED COUNT: 15.3 %
EST. GFR  (AFRICAN AMERICAN): >60 ML/MIN/1.73 M^2
EST. GFR  (NON AFRICAN AMERICAN): >60 ML/MIN/1.73 M^2
GLUCOSE SERPL-MCNC: 78 MG/DL
HCT VFR BLD AUTO: 34.5 %
HGB BLD-MCNC: 12.4 G/DL
LDH SERPL L TO P-CCNC: 168 U/L
LYMPHOCYTES # BLD AUTO: 0.9 K/UL
LYMPHOCYTES NFR BLD: 18.3 %
MAGNESIUM SERPL-MCNC: 2.3 MG/DL
MCH RBC QN AUTO: 35.8 PG
MCHC RBC AUTO-ENTMCNC: 35.9 G/DL
MCV RBC AUTO: 100 FL
MONOCYTES # BLD AUTO: 0.8 K/UL
MONOCYTES NFR BLD: 16.1 %
NEUTROPHILS # BLD AUTO: 3.2 K/UL
NEUTROPHILS NFR BLD: 62.8 %
PLATELET # BLD AUTO: 194 K/UL
PMV BLD AUTO: 8.6 FL
POTASSIUM SERPL-SCNC: 3.8 MMOL/L
PROT SERPL-MCNC: 7.5 G/DL
RBC # BLD AUTO: 3.46 M/UL
SODIUM SERPL-SCNC: 140 MMOL/L
WBC # BLD AUTO: 5.04 K/UL

## 2018-07-12 PROCEDURE — 83735 ASSAY OF MAGNESIUM: CPT | Mod: PO

## 2018-07-12 PROCEDURE — 36415 COLL VENOUS BLD VENIPUNCTURE: CPT | Mod: PO

## 2018-07-12 PROCEDURE — 85025 COMPLETE CBC W/AUTO DIFF WBC: CPT | Mod: PO

## 2018-07-12 PROCEDURE — 83615 LACTATE (LD) (LDH) ENZYME: CPT | Mod: PO

## 2018-07-12 PROCEDURE — 80053 COMPREHEN METABOLIC PANEL: CPT | Mod: PO

## 2018-07-13 ENCOUNTER — OFFICE VISIT (OUTPATIENT)
Dept: HEMATOLOGY/ONCOLOGY | Facility: CLINIC | Age: 28
End: 2018-07-13
Payer: COMMERCIAL

## 2018-07-13 ENCOUNTER — INFUSION (OUTPATIENT)
Dept: INFUSION THERAPY | Facility: HOSPITAL | Age: 28
End: 2018-07-13
Attending: INTERNAL MEDICINE
Payer: COMMERCIAL

## 2018-07-13 VITALS
DIASTOLIC BLOOD PRESSURE: 74 MMHG | SYSTOLIC BLOOD PRESSURE: 103 MMHG | HEART RATE: 97 BPM | WEIGHT: 160.25 LBS | RESPIRATION RATE: 17 BRPM | BODY MASS INDEX: 28.39 KG/M2 | HEIGHT: 63 IN | TEMPERATURE: 99 F

## 2018-07-13 DIAGNOSIS — C50.011 MALIGNANT NEOPLASM OF NIPPLE OF RIGHT BREAST IN FEMALE, UNSPECIFIED ESTROGEN RECEPTOR STATUS: Primary | ICD-10-CM

## 2018-07-13 DIAGNOSIS — C50.212 MALIGNANT NEOPLASM OF UPPER-INNER QUADRANT OF LEFT FEMALE BREAST, UNSPECIFIED ESTROGEN RECEPTOR STATUS: Primary | ICD-10-CM

## 2018-07-13 PROCEDURE — 99214 OFFICE O/P EST MOD 30 MIN: CPT | Mod: S$GLB,,, | Performed by: INTERNAL MEDICINE

## 2018-07-13 PROCEDURE — 99999 PR PBB SHADOW E&M-EST. PATIENT-LVL III: CPT | Mod: PBBFAC,,, | Performed by: INTERNAL MEDICINE

## 2018-07-13 PROCEDURE — 96523 IRRIG DRUG DELIVERY DEVICE: CPT | Mod: PN

## 2018-07-13 PROCEDURE — 63600175 PHARM REV CODE 636 W HCPCS: Mod: PN | Performed by: INTERNAL MEDICINE

## 2018-07-13 RX ORDER — TAMOXIFEN CITRATE 20 MG/1
20 TABLET ORAL DAILY
Qty: 90 TABLET | Refills: 3 | Status: SHIPPED | OUTPATIENT
Start: 2018-07-13 | End: 2019-07-19

## 2018-07-13 RX ORDER — HEPARIN 100 UNIT/ML
500 SYRINGE INTRAVENOUS
Status: CANCELLED | OUTPATIENT
Start: 2018-07-13

## 2018-07-13 RX ORDER — SODIUM CHLORIDE 0.9 % (FLUSH) 0.9 %
10 SYRINGE (ML) INJECTION
Status: CANCELLED | OUTPATIENT
Start: 2018-07-13

## 2018-07-13 RX ORDER — HEPARIN 100 UNIT/ML
500 SYRINGE INTRAVENOUS
Status: COMPLETED | OUTPATIENT
Start: 2018-07-13 | End: 2018-07-13

## 2018-07-13 RX ORDER — SODIUM CHLORIDE 0.9 % (FLUSH) 0.9 %
10 SYRINGE (ML) INJECTION
Status: DISCONTINUED | OUTPATIENT
Start: 2018-07-13 | End: 2018-07-13 | Stop reason: HOSPADM

## 2018-07-13 RX ADMIN — HEPARIN 500 UNITS: 100 SYRINGE at 09:07

## 2018-07-13 NOTE — PROGRESS NOTES
HISTORY OF PRESENT ILLNESS:  The patient is a 27-year-old white female well   known to me for locally advanced left breast carcinoma, which is ER positive, ID   positive and HER-2/raeann negative.  The patient underwent neoadjuvant   chemotherapy consisting of four cycles of Adriamycin/Cytoxan followed by four   cycles of Taxotere.  She went on to have bilateral skin sparing mastectomies and   tissue expander reconstruction.  The patient completed postoperative adjuvant   external beam radiation therapy.  She also has completed six cycles of adjuvant   Xeloda for residual disease found at the time of her surgery.  She returns to   clinic for interval reevaluation and to discuss the initiation of endocrine   therapy.  Menstrual periods have resumed.  The patient is scheduled for port   removal and is in the process of attempting to adopt a child.  No other   complaints or pertinent findings on a 14-point review of systems.    PHYSICAL EXAMINATION:  GENERAL:  Well-developed, well-nourished white female in no acute distress.  VITAL SIGNS:  Weight 160.5 pounds (decreased by 1/2 pound).  Documented in EMR   and reviewed.  HEENT:  Normocephalic, atraumatic.  Oral mucosa pink and moist.  Lips without   lesions.  Tongue midline.  Oropharynx clear.  Nonicteric sclerae.   NECK:  Supple, no adenopathy.  No carotid bruits, thyromegaly or thyroid nodule.  HEART:  Regular rate and rhythm without murmur, gallop or rub.                LUNGS:  Clear to auscultation bilaterally.  Normal respiratory effort.       ABDOMEN:  Soft, nontender, nondistended with positive normoactive bowel sounds,   no hepatosplenomegaly.    EXTREMITIES:  No cyanosis, clubbing or edema.  Distal pulses are intact.                                              AXILLAE AND GROIN:  No palpable pathologic lymphadenopathy is appreciated.        SKIN:  Intact/turgor normal.  NEUROLOGIC:  Cranial nerves II-XII grossly intact.  Motor:  Good muscle bulk and   tone.   Strength/sensory 5/5 throughout.  Gait stable.    LABORATORY:  White count 5, H and H 12.4 and 34.5, platelet count of 194.    Sodium 140, potassium 3.8, chloride 103, CO2 29, BUN 20, creatinine 0.9, glucose   78, calcium 9.1, mag 2.3.  Liver function tests are within normal limits.  LDH   168.  GFR is greater than 60.    IMPRESSION:  1.  Locally advanced left breast carcinoma as detailed above -- no evidence of   disease.  2.  Resumption of menses following chemotherapy.    PLAN:  1.  Begin adjuvant endocrine therapy to consist of tamoxifen 20 mg p.o. daily   with a planned duration of 60 months unless the patient becomes amenorrheic for   greater than a year.  2.  Schedule cancer survivorship appointment.  3.  Proceed with port removal.  4.  Return to clinic three months from now without interval lab.      CHRIS/HN  dd: 07/13/2018 09:50:29 (CDT)  td: 07/14/2018 03:10:40 (CDT)  Doc ID   #[id number]  Job ID #[id number]    CC:

## 2018-07-17 ENCOUNTER — PATIENT MESSAGE (OUTPATIENT)
Dept: HEMATOLOGY/ONCOLOGY | Facility: CLINIC | Age: 28
End: 2018-07-17

## 2018-07-25 ENCOUNTER — PATIENT MESSAGE (OUTPATIENT)
Dept: HEMATOLOGY/ONCOLOGY | Facility: CLINIC | Age: 28
End: 2018-07-25

## 2018-07-26 ENCOUNTER — PATIENT MESSAGE (OUTPATIENT)
Dept: HEMATOLOGY/ONCOLOGY | Facility: CLINIC | Age: 28
End: 2018-07-26

## 2018-08-17 ENCOUNTER — PROCEDURE VISIT (OUTPATIENT)
Dept: SURGERY | Facility: CLINIC | Age: 28
End: 2018-08-17
Payer: COMMERCIAL

## 2018-08-17 VITALS
BODY MASS INDEX: 29.57 KG/M2 | HEART RATE: 84 BPM | HEIGHT: 62 IN | SYSTOLIC BLOOD PRESSURE: 119 MMHG | TEMPERATURE: 99 F | DIASTOLIC BLOOD PRESSURE: 77 MMHG | WEIGHT: 160.69 LBS

## 2018-08-17 DIAGNOSIS — Z85.3 PERSONAL HISTORY OF BREAST CANCER: ICD-10-CM

## 2018-08-17 DIAGNOSIS — Z17.0 MALIGNANT NEOPLASM OF UPPER-INNER QUADRANT OF LEFT BREAST IN FEMALE, ESTROGEN RECEPTOR POSITIVE: Primary | ICD-10-CM

## 2018-08-17 DIAGNOSIS — C50.212 MALIGNANT NEOPLASM OF UPPER-INNER QUADRANT OF LEFT BREAST IN FEMALE, ESTROGEN RECEPTOR POSITIVE: Primary | ICD-10-CM

## 2018-08-17 PROCEDURE — 36590 REMOVAL TUNNELED CV CATH: CPT | Mod: S$GLB,,, | Performed by: SURGERY

## 2018-08-17 NOTE — PROCEDURES
Port Removal Procedure Note           Physician: Re Harman      Assistant:  Genia Mauricio MD     Diagnosis: right breast cancer     Procedure:   right port a cath  removal     Date: 07/08/2016     Location: right subclavian vein     Anesthesia: Local         Procedure:  After informed consent and timeout was performed, the patient was taken to the minor room. Patient was placed supine on the table and arms were tucked by her side. Following this local anesthetic was injected on the anterior right chest. A small incision was made and the port pocket was dissected out. Next, the port was removed from the anterior right pocket. A figure of 8 3-0 vicryl was used to close the catheter opening and the catheter was removed. Wound was irrigated and hemostasis was achieved. The port incision was closed using interuppted 3-0 vicryl followed by running 4-0 monocryl subcuticular. Dressings were applied and all counts were correct at the end of the procedure. Patient tolerated the procedure well.      Complications/Comments:  none     Estimated Blood Loss: 2 cc     Disposition: home     Attestation: I was present for the entire procedure assisted by a qualified resident.

## 2018-08-22 ENCOUNTER — TELEPHONE (OUTPATIENT)
Dept: INFUSION THERAPY | Facility: HOSPITAL | Age: 28
End: 2018-08-22

## 2018-08-27 ENCOUNTER — PATIENT MESSAGE (OUTPATIENT)
Dept: HEMATOLOGY/ONCOLOGY | Facility: CLINIC | Age: 28
End: 2018-08-27

## 2018-08-28 ENCOUNTER — PATIENT MESSAGE (OUTPATIENT)
Dept: HEMATOLOGY/ONCOLOGY | Facility: CLINIC | Age: 28
End: 2018-08-28

## 2018-09-10 ENCOUNTER — PATIENT MESSAGE (OUTPATIENT)
Dept: HEMATOLOGY/ONCOLOGY | Facility: CLINIC | Age: 28
End: 2018-09-10

## 2018-09-19 ENCOUNTER — PATIENT MESSAGE (OUTPATIENT)
Dept: HEMATOLOGY/ONCOLOGY | Facility: CLINIC | Age: 28
End: 2018-09-19

## 2018-09-28 ENCOUNTER — PATIENT MESSAGE (OUTPATIENT)
Dept: HEMATOLOGY/ONCOLOGY | Facility: CLINIC | Age: 28
End: 2018-09-28

## 2018-10-11 ENCOUNTER — PATIENT MESSAGE (OUTPATIENT)
Dept: HEMATOLOGY/ONCOLOGY | Facility: CLINIC | Age: 28
End: 2018-10-11

## 2018-10-12 ENCOUNTER — OFFICE VISIT (OUTPATIENT)
Dept: HEMATOLOGY/ONCOLOGY | Facility: CLINIC | Age: 28
End: 2018-10-12
Payer: COMMERCIAL

## 2018-10-12 VITALS
SYSTOLIC BLOOD PRESSURE: 106 MMHG | TEMPERATURE: 99 F | HEIGHT: 63 IN | BODY MASS INDEX: 28.56 KG/M2 | RESPIRATION RATE: 18 BRPM | WEIGHT: 161.19 LBS | DIASTOLIC BLOOD PRESSURE: 74 MMHG | HEART RATE: 93 BPM

## 2018-10-12 DIAGNOSIS — N91.1 AMENORRHEA, SECONDARY: ICD-10-CM

## 2018-10-12 DIAGNOSIS — C50.212 MALIGNANT NEOPLASM OF UPPER-INNER QUADRANT OF LEFT FEMALE BREAST, UNSPECIFIED ESTROGEN RECEPTOR STATUS: Primary | ICD-10-CM

## 2018-10-12 DIAGNOSIS — C77.9 REGIONAL LYMPH NODE METASTASIS PRESENT: ICD-10-CM

## 2018-10-12 PROCEDURE — 99999 PR PBB SHADOW E&M-EST. PATIENT-LVL III: CPT | Mod: PBBFAC,,, | Performed by: INTERNAL MEDICINE

## 2018-10-12 PROCEDURE — 99213 OFFICE O/P EST LOW 20 MIN: CPT | Mod: S$GLB,,, | Performed by: INTERNAL MEDICINE

## 2018-10-12 NOTE — PROGRESS NOTES
HISTORY OF PRESENT ILLNESS:  The patient is a 27-year-old white female well   known to me for locally advanced left breast carcinoma, which is ER positive, ID   positive, HER-2/raeann negative and BRCA negative.  The patient underwent   neoadjuvant chemotherapy consisting of four cycles of Adriamycin/Cytoxan and   four cycles of Taxotere.  Following this, she had bilateral skin-sparing   mastectomies and tissue expander reconstruction.  The patient completed   postoperative adjuvant, external beam radiation therapy.  As she had residual   saritha disease at the time of surgery, she also completed six months of adjuvant   Xeloda.  She is currently maintained on adjuvant endocrine therapy consisting of   20 mg of tamoxifen daily.  She is three months out from completion of Xeloda   and returns for interval surveillance examination.  The patient has had   intermittent menses, but no active period since July.  She continues to   experience hot flashes while on tamoxifen.  No other new complaints or pertinent   findings on a 10-point review of systems.    PHYSICAL EXAMINATION:  GENERAL:  Well-developed, well-nourished white female in no acute distress.  VITAL SIGNS:  Weight 161 pounds (increased by 1/2 pound).  Documented in EMR and   reviewed.  HEENT:  Normocephalic, atraumatic.  Oral mucosa pink and moist.  Lips without   lesions.  Tongue midline.  Oropharynx clear.  Nonicteric sclerae.   NECK:  Supple.  No adenopathy.  HEART:  Regular rate and rhythm without murmur, gallop or rub.               LUNGS:  Clear to auscultation bilaterally.                                   ABDOMEN:  Soft, nontender and nondistended with positive normoactive bowel   sounds.  No hepatosplenomegaly.  EXTREMITIES:  No cyanosis, clubbing or edema.  Distal pulses are intact.  BREASTS:  Both surgically reconstructed and no signs of local recurrence.    IMPRESSION:  1.  Locally advanced left breast carcinoma without evidence of disease    recurrence.  2.  Intermittent menses following receipt of chemotherapy.  3.  Hot flashes secondary to tamoxifen.    PLAN:  1.  Continue tamoxifen 20 mg daily.  2.  Return to clinic three months from now without interval study for   reexamination.      CHRIS/TONI  dd: 10/12/2018 15:36:55 (CDT)  td: 10/13/2018 01:45:18 (CDT)  Doc ID   #[id number]  Job ID #[id number]    CC:

## 2018-11-15 ENCOUNTER — OFFICE VISIT (OUTPATIENT)
Dept: HEMATOLOGY/ONCOLOGY | Facility: CLINIC | Age: 28
End: 2018-11-15
Payer: COMMERCIAL

## 2018-11-15 VITALS
SYSTOLIC BLOOD PRESSURE: 115 MMHG | RESPIRATION RATE: 18 BRPM | DIASTOLIC BLOOD PRESSURE: 70 MMHG | HEIGHT: 63 IN | BODY MASS INDEX: 29.02 KG/M2 | HEART RATE: 77 BPM | TEMPERATURE: 98 F | WEIGHT: 163.81 LBS

## 2018-11-15 DIAGNOSIS — C50.212 MALIGNANT NEOPLASM OF UPPER-INNER QUADRANT OF LEFT BREAST IN FEMALE, ESTROGEN RECEPTOR POSITIVE: Primary | ICD-10-CM

## 2018-11-15 DIAGNOSIS — C50.012 MALIGNANT NEOPLASM OF NIPPLE OF LEFT BREAST IN FEMALE, ESTROGEN RECEPTOR POSITIVE: ICD-10-CM

## 2018-11-15 DIAGNOSIS — Z17.0 MALIGNANT NEOPLASM OF UPPER-INNER QUADRANT OF LEFT BREAST IN FEMALE, ESTROGEN RECEPTOR POSITIVE: Primary | ICD-10-CM

## 2018-11-15 DIAGNOSIS — Z17.0 MALIGNANT NEOPLASM OF NIPPLE OF LEFT BREAST IN FEMALE, ESTROGEN RECEPTOR POSITIVE: ICD-10-CM

## 2018-11-15 PROCEDURE — 99999 PR PBB SHADOW E&M-EST. PATIENT-LVL IV: CPT | Mod: PBBFAC,,, | Performed by: NURSE PRACTITIONER

## 2018-11-15 PROCEDURE — 99214 OFFICE O/P EST MOD 30 MIN: CPT | Mod: S$GLB,,, | Performed by: NURSE PRACTITIONER

## 2018-12-06 ENCOUNTER — OFFICE VISIT (OUTPATIENT)
Dept: SURGERY | Facility: CLINIC | Age: 28
End: 2018-12-06
Payer: COMMERCIAL

## 2018-12-06 VITALS
TEMPERATURE: 99 F | HEIGHT: 62 IN | DIASTOLIC BLOOD PRESSURE: 75 MMHG | SYSTOLIC BLOOD PRESSURE: 120 MMHG | BODY MASS INDEX: 29.08 KG/M2 | WEIGHT: 158 LBS | HEART RATE: 72 BPM

## 2018-12-06 DIAGNOSIS — C50.911 MALIGNANT NEOPLASM OF RIGHT FEMALE BREAST, UNSPECIFIED ESTROGEN RECEPTOR STATUS, UNSPECIFIED SITE OF BREAST: Primary | ICD-10-CM

## 2018-12-06 PROCEDURE — 99213 OFFICE O/P EST LOW 20 MIN: CPT | Mod: S$GLB,,, | Performed by: SURGERY

## 2018-12-06 PROCEDURE — 99999 PR PBB SHADOW E&M-EST. PATIENT-LVL III: CPT | Mod: PBBFAC,,, | Performed by: SURGERY

## 2018-12-06 NOTE — Clinical Note
Dr. Gaming,Any thoughts on ovarian suppression for her given high risk? She and I discussed it briefly when I saw her last week.[email or web address]

## 2018-12-12 NOTE — PROGRESS NOTES
REFERRING PHYSICIAN:  No referring provider defined for this encounter.       Brenda Jaimes MD      DIAGNOSIS:    This is a 28 y.o. female with a stage IIB pT2 N1 M0 grade 2 ER + [place] + HER2 negative of the left breast.    TREATMENT SUMMARY:  The patient is status post bilateral mastectomies and sentinel node biopsy on the left.  Final pathology showed invasive cancer 1.4cm with surrounding DCIS of 1.5cm and one lymph node with micrometastasis (0.3mm) on the left side. There were 4 additional lymph nodes negative for cancer. She was treated with 4 cycles of neoadjuvant Taxotere and four cycles of adjuvant Adriamycin/Cytoxan. Repeat PET scan showing resolution of breast mass and decreased size in left axillary node prior to having surgery.    INTERVAL HISTORY:   Berto Chun comes in for a post-op check.  She denies fever, chills, chest pain or shortness of breath.  Her pain is well controlled.    She occasionally has pain on the left lateral chest wall near the drain exit sites.  She has complete radiation therapy and chemotherapy with adjuvant Xeloda.  Currently on Tamoxifen.      Is adopting a baby due this spring.      MEDICATIONS:  Current Outpatient Medications   Medication Sig Dispense Refill    ALPRAZolam (XANAX) 0.25 MG tablet Take 1 tablet (0.25 mg total) by mouth 3 (three) times daily as needed for Anxiety. 90 tablet 3    biotin 1 mg Cap Take by mouth.      bisacodyl (DULCOLAX) 5 mg EC tablet Take 5 mg by mouth daily as needed for Constipation.      clindamycin-tretinoin (ZIANA) gel Apply a thin coat to the affected area every night.      desvenlafaxine succinate (PRISTIQ) 50 MG Tb24 Take 1 tablet (50 mg total) by mouth once daily. 30 tablet 5    ondansetron (ZOFRAN-ODT) 8 MG TbDL       PNV#75/IRON FUM/FA/OM3/DHA/EPA (ONE A DAY WOMEN'S PRENATAL DHA ORAL) Take 1 tablet by mouth once daily.      prochlorperazine (COMPAZINE) 10 MG tablet TK 1 T PO Q 6 H PRF NV  6    tamoxifen (NOLVADEX) 20 MG  Tab Take 1 tablet (20 mg total) by mouth once daily. 90 tablet 3    venlafaxine (EFFEXOR) 37.5 MG Tab Take 1 tablet (37.5 mg total) by mouth once daily. 90 tablet 3     No current facility-administered medications for this visit.        ALLERGIES:   Review of patient's allergies indicates:  No Known Allergies    PHYSICAL EXAMINATION:   General:  This is a well appearing female with appropriate speech, affect and gait.     Breast:  All incisions have healed well.  The left breast is much harder related to post radiation changes.  There are no suspicious masses or skin changes on exam. No LAD.  Excellent cosmesis.    IMPRESSION:   BREANNA    PLAN:   Will plan on removal of port in the office at next appt if cleared by oncology  Will discuss ovarian supression wit medical oncology given young age.  rtc 6 months

## 2019-01-04 ENCOUNTER — TELEPHONE (OUTPATIENT)
Dept: HEMATOLOGY/ONCOLOGY | Facility: CLINIC | Age: 29
End: 2019-01-04

## 2019-01-04 NOTE — TELEPHONE ENCOUNTER
As md will be out of the office on 1/11/19, called patient and rescheduled to 1/16/19 at 11 am. Patient voiced understanding and appreciation.    **DO NOT MOVE UNLESS PATIENT REQEUSTS AS SHE DRIVES IN FROM [place] TO SEE DR. GUERRA.

## 2019-01-16 ENCOUNTER — OFFICE VISIT (OUTPATIENT)
Dept: HEMATOLOGY/ONCOLOGY | Facility: CLINIC | Age: 29
End: 2019-01-16
Payer: COMMERCIAL

## 2019-01-16 VITALS
RESPIRATION RATE: 18 BRPM | HEIGHT: 63 IN | SYSTOLIC BLOOD PRESSURE: 97 MMHG | WEIGHT: 160.5 LBS | HEART RATE: 82 BPM | TEMPERATURE: 99 F | DIASTOLIC BLOOD PRESSURE: 67 MMHG | BODY MASS INDEX: 28.44 KG/M2

## 2019-01-16 DIAGNOSIS — Z17.0 MALIGNANT NEOPLASM OF UPPER-INNER QUADRANT OF LEFT BREAST IN FEMALE, ESTROGEN RECEPTOR POSITIVE: Primary | ICD-10-CM

## 2019-01-16 DIAGNOSIS — C77.9 REGIONAL LYMPH NODE METASTASIS PRESENT: ICD-10-CM

## 2019-01-16 DIAGNOSIS — T45.1X5A HOT FLASHES DUE TO TAMOXIFEN: ICD-10-CM

## 2019-01-16 DIAGNOSIS — N92.6 IRREGULAR MENSES: ICD-10-CM

## 2019-01-16 DIAGNOSIS — C50.212 MALIGNANT NEOPLASM OF UPPER-INNER QUADRANT OF LEFT BREAST IN FEMALE, ESTROGEN RECEPTOR POSITIVE: Primary | ICD-10-CM

## 2019-01-16 DIAGNOSIS — R23.2 HOT FLASHES DUE TO TAMOXIFEN: ICD-10-CM

## 2019-01-16 PROCEDURE — 99214 OFFICE O/P EST MOD 30 MIN: CPT | Mod: S$GLB,,, | Performed by: INTERNAL MEDICINE

## 2019-01-16 PROCEDURE — 99999 PR PBB SHADOW E&M-EST. PATIENT-LVL III: ICD-10-PCS | Mod: PBBFAC,,, | Performed by: INTERNAL MEDICINE

## 2019-01-16 PROCEDURE — 99999 PR PBB SHADOW E&M-EST. PATIENT-LVL III: CPT | Mod: PBBFAC,,, | Performed by: INTERNAL MEDICINE

## 2019-01-16 PROCEDURE — 99214 PR OFFICE/OUTPT VISIT, EST, LEVL IV, 30-39 MIN: ICD-10-PCS | Mod: S$GLB,,, | Performed by: INTERNAL MEDICINE

## 2019-01-16 NOTE — PROGRESS NOTES
HISTORY OF PRESENT ILLNESS:  The patient is a 28-year-old white female well   known to me for locally advanced left breast carcinoma, which is ER positive, IL   positive, HER-2/raeann negative, and BRCA1 and 2 negative.  The patient underwent   neoadjuvant therapy consisting of four cycles of Adriamycin/Cytoxan followed by   4 cycles of Taxotere.  Following this, she had bilateral skin sparing   mastectomies with tissue expander reconstruction.  The patient completed   postoperative adjuvant external beam radiation therapy and a six-month course of   adjuvant Xeloda as she had residual node positive disease at the time of   surgery.  The patient remains on adjuvant tamoxifen 20 mg daily.  Menses are   intermittent.  She had her period in July and then none until January.  She is   tolerating the tamoxifen well.  Plans for adoption are proceeding and they are   expecting to have a baby boy in March.  No other pertinent findings on a   14-point review of system.    PHYSICAL EXAMINATION:  GENERAL:  Well-developed, well-nourished white female in no acute distress.  VITAL SIGNS:  Weight 160-1/2 pounds (decreased by 1/2 pound).  HEENT:  Normocephalic, atraumatic.  Oral mucosa pink and moist.  Lips without   lesions.  Tongue midline.  Oropharynx clear.  Nonicteric sclerae.   NECK:  Supple, no adenopathy.  No carotid bruits, thyromegaly or thyroid nodule.                                                                        HEART:  Regular rate and rhythm without murmur, gallop or rub.                LUNGS:  Clear to auscultation bilaterally.  Normal respiratory effort.       ABDOMEN:  Soft, nontender, nondistended with positive normoactive bowel sounds,   no hepatosplenomegaly.    EXTREMITIES:  No cyanosis, clubbing or edema.  Distal pulses are intact.                                              AXILLAE AND GROIN:  No palpable pathologic lymphadenopathy is appreciated.        SKIN:  Intact/turgor normal                                                               NEUROLOGIC:  Cranial nerves II-XII grossly intact.  Motor:  Good muscle bulk and   tone.  Strength/sensory 5/5 throughout.  Gait stable.   BREASTS:  Both surgically reconstructed and free from signs of local recurrence.    IMPRESSION:  1.  Locally advanced left breast carcinoma without disease recurrence.  2.  Intermittent menses following receipt of chemotherapy.  3.  Hot flashes secondary to tamoxifen mild/stable.    PLAN:  Continue tamoxifen 20 mg daily for now.  I have discussed options for   suppression of ovarian function with the patient should she continue to   experience periods.  These included Depo Lupron every 6 months for hysterectomy,   which would be followed by transition from adjuvant tamoxifen to adjuvant   aromatase inhibition.  At the present time, the patient is uncertain as to how   she would like to proceed.  We have agreed that she will discuss this with her   .  Upon returning at [place] and inform us of decision by the time of her   next office evaluation, which will be scheduled 3 months from now, at which   time the patient will be 9 months out from completion of adjuvant chemo.      CHRIS/HN  dd: 01/16/2019 12:06:54 (CST)  td: 01/17/2019 00:33:56 (CST)  Doc ID   #[id number]  Job ID #[id number]    CC:

## 2019-03-27 ENCOUNTER — OFFICE VISIT (OUTPATIENT)
Dept: SURGERY | Facility: CLINIC | Age: 29
End: 2019-03-27
Payer: COMMERCIAL

## 2019-03-27 ENCOUNTER — PATIENT MESSAGE (OUTPATIENT)
Dept: SURGERY | Facility: CLINIC | Age: 29
End: 2019-03-27

## 2019-03-27 VITALS
DIASTOLIC BLOOD PRESSURE: 73 MMHG | HEART RATE: 73 BPM | HEIGHT: 63 IN | WEIGHT: 160.5 LBS | BODY MASS INDEX: 28.44 KG/M2 | SYSTOLIC BLOOD PRESSURE: 112 MMHG

## 2019-03-27 DIAGNOSIS — M79.621 AXILLARY PAIN, RIGHT: ICD-10-CM

## 2019-03-27 DIAGNOSIS — Z85.3 PERSONAL HISTORY OF BREAST CANCER: Primary | ICD-10-CM

## 2019-03-27 DIAGNOSIS — Z91.89 AT HIGH RISK FOR BREAST CANCER: ICD-10-CM

## 2019-03-27 PROCEDURE — 99999 PR PBB SHADOW E&M-EST. PATIENT-LVL III: ICD-10-PCS | Mod: PBBFAC,,, | Performed by: SURGERY

## 2019-03-27 PROCEDURE — 99213 PR OFFICE/OUTPT VISIT, EST, LEVL III, 20-29 MIN: ICD-10-PCS | Mod: S$GLB,,, | Performed by: SURGERY

## 2019-03-27 PROCEDURE — 99999 PR PBB SHADOW E&M-EST. PATIENT-LVL III: CPT | Mod: PBBFAC,,, | Performed by: SURGERY

## 2019-03-27 PROCEDURE — 99213 OFFICE O/P EST LOW 20 MIN: CPT | Mod: S$GLB,,, | Performed by: SURGERY

## 2019-03-28 ENCOUNTER — PATIENT MESSAGE (OUTPATIENT)
Dept: HEMATOLOGY/ONCOLOGY | Facility: CLINIC | Age: 29
End: 2019-03-28

## 2019-03-29 NOTE — PROGRESS NOTES
REFERRING PHYSICIAN:  No referring provider defined for this encounter.       Brenda Jaimes MD      DIAGNOSIS:    This is a 28 y.o. female with a stage IIB pT2 N1 M0 grade 2 ER + [place] + HER2 negative of the left breast.    TREATMENT SUMMARY:  The patient is status post bilateral mastectomies and sentinel node biopsy on the left.  Final pathology showed invasive cancer 1.4cm with surrounding DCIS of 1.5cm and one lymph node with micrometastasis (0.3mm) on the left side. There were 4 additional lymph nodes negative for cancer. She was treated with 4 cycles of neoadjuvant Taxotere and four cycles of neoadjuvant Adriamycin/Cytoxan. Repeat PET scan showing resolution of breast mass and decreased size in left axillary node prior to having surgery.    INTERVAL HISTORY:   Berto Chun comes in for a post-op check.  She denies fever, chills, chest pain or shortness of breath.  Her pain is well controlled.    She occasionally has pain on the left lateral chest wall with tightness.  She has complete radiation therapy and chemotherapy with adjuvant Xeloda.  Currently on Tamoxifen.  Discussed with Dr. Gaming possible ovarian suppression and AI.    Adopted  baby.  Remy Luna is 1 month old.      MEDICATIONS:  Current Outpatient Medications   Medication Sig Dispense Refill    tamoxifen (NOLVADEX) 20 MG Tab Take 1 tablet (20 mg total) by mouth once daily. 90 tablet 3    venlafaxine (EFFEXOR) 37.5 MG Tab Take 1 tablet (37.5 mg total) by mouth once daily. 90 tablet 3    ALPRAZolam (XANAX) 0.25 MG tablet Take 1 tablet (0.25 mg total) by mouth 3 (three) times daily as needed for Anxiety. 90 tablet 3    biotin 1 mg Cap Take by mouth.      bisacodyl (DULCOLAX) 5 mg EC tablet Take 5 mg by mouth daily as needed for Constipation.      clindamycin-tretinoin (ZIANA) gel Apply a thin coat to the affected area every night.      desvenlafaxine succinate (PRISTIQ) 50 MG Tb24 Take 1 tablet (50 mg total) by mouth once daily.  30 tablet 5    ondansetron (ZOFRAN-ODT) 8 MG TbDL       PNV#75/IRON FUM/FA/OM3/DHA/EPA (ONE A DAY WOMEN'S PRENATAL DHA ORAL) Take 1 tablet by mouth once daily.      prochlorperazine (COMPAZINE) 10 MG tablet TK 1 T PO Q 6 H PRF NV  6     No current facility-administered medications for this visit.        ALLERGIES:   Review of patient's allergies indicates:  No Known Allergies    PHYSICAL EXAMINATION:   General:  This is a well appearing female with appropriate speech, affect and gait.     Breast:  All incisions have healed well.  The left breast is much harder related to post radiation changes.  There are no suspicious masses or skin changes on exam. No LAD.  Excellent cosmesis. There is decrease ROM left with some thickening laterally.    IMPRESSION:   BREANNA    PLAN:   Will discuss ovarian supression wit medical oncology given young age.  rtc 6 months  Will get MRI to evaluate the new tightness lateral left chest wall.

## 2019-04-26 ENCOUNTER — INFUSION (OUTPATIENT)
Dept: INFUSION THERAPY | Facility: HOSPITAL | Age: 29
End: 2019-04-26
Attending: INTERNAL MEDICINE
Payer: COMMERCIAL

## 2019-04-26 ENCOUNTER — HOSPITAL ENCOUNTER (OUTPATIENT)
Dept: RADIOLOGY | Facility: OTHER | Age: 29
Discharge: HOME | End: 2019-04-26
Attending: SURGERY
Payer: COMMERCIAL

## 2019-04-26 ENCOUNTER — OFFICE VISIT (OUTPATIENT)
Dept: HEMATOLOGY/ONCOLOGY | Facility: CLINIC | Age: 29
End: 2019-04-26
Payer: COMMERCIAL

## 2019-04-26 VITALS
HEIGHT: 63 IN | SYSTOLIC BLOOD PRESSURE: 112 MMHG | HEART RATE: 96 BPM | RESPIRATION RATE: 20 BRPM | TEMPERATURE: 98 F | DIASTOLIC BLOOD PRESSURE: 72 MMHG | BODY MASS INDEX: 28.05 KG/M2 | WEIGHT: 158.31 LBS

## 2019-04-26 VITALS
HEART RATE: 96 BPM | RESPIRATION RATE: 20 BRPM | SYSTOLIC BLOOD PRESSURE: 112 MMHG | DIASTOLIC BLOOD PRESSURE: 72 MMHG | TEMPERATURE: 98 F

## 2019-04-26 DIAGNOSIS — Z17.0 MALIGNANT NEOPLASM OF NIPPLE OF LEFT BREAST IN FEMALE, ESTROGEN RECEPTOR POSITIVE: Primary | ICD-10-CM

## 2019-04-26 DIAGNOSIS — T45.1X5A HOT FLASHES DUE TO TAMOXIFEN: ICD-10-CM

## 2019-04-26 DIAGNOSIS — Z91.89 AT HIGH RISK FOR BREAST CANCER: ICD-10-CM

## 2019-04-26 DIAGNOSIS — Z17.0 MALIGNANT NEOPLASM OF UPPER-INNER QUADRANT OF LEFT BREAST IN FEMALE, ESTROGEN RECEPTOR POSITIVE: Primary | ICD-10-CM

## 2019-04-26 DIAGNOSIS — R23.2 HOT FLASHES DUE TO TAMOXIFEN: ICD-10-CM

## 2019-04-26 DIAGNOSIS — C50.212 MALIGNANT NEOPLASM OF UPPER-INNER QUADRANT OF LEFT BREAST IN FEMALE, ESTROGEN RECEPTOR POSITIVE: Primary | ICD-10-CM

## 2019-04-26 DIAGNOSIS — M79.621 AXILLARY PAIN, RIGHT: ICD-10-CM

## 2019-04-26 DIAGNOSIS — C50.012 MALIGNANT NEOPLASM OF NIPPLE OF LEFT BREAST IN FEMALE, ESTROGEN RECEPTOR POSITIVE: Primary | ICD-10-CM

## 2019-04-26 DIAGNOSIS — Z85.3 PERSONAL HISTORY OF BREAST CANCER: ICD-10-CM

## 2019-04-26 DIAGNOSIS — N92.6 IRREGULAR MENSES: ICD-10-CM

## 2019-04-26 DIAGNOSIS — C77.9 REGIONAL LYMPH NODE METASTASIS PRESENT: ICD-10-CM

## 2019-04-26 PROCEDURE — 99999 PR PBB SHADOW E&M-EST. PATIENT-LVL III: ICD-10-PCS | Mod: PBBFAC,,, | Performed by: INTERNAL MEDICINE

## 2019-04-26 PROCEDURE — 96402 CHEMO HORMON ANTINEOPL SQ/IM: CPT | Mod: PN

## 2019-04-26 PROCEDURE — 77049 MRI BREAST C-+ W/CAD BI: CPT | Mod: TC

## 2019-04-26 PROCEDURE — 99214 OFFICE O/P EST MOD 30 MIN: CPT | Mod: S$GLB,,, | Performed by: INTERNAL MEDICINE

## 2019-04-26 PROCEDURE — 25500020 PHARM REV CODE 255: Performed by: SURGERY

## 2019-04-26 PROCEDURE — 99214 PR OFFICE/OUTPT VISIT, EST, LEVL IV, 30-39 MIN: ICD-10-PCS | Mod: S$GLB,,, | Performed by: INTERNAL MEDICINE

## 2019-04-26 PROCEDURE — A9577 INJ MULTIHANCE: HCPCS | Performed by: SURGERY

## 2019-04-26 PROCEDURE — 99999 PR PBB SHADOW E&M-EST. PATIENT-LVL III: CPT | Mod: PBBFAC,,, | Performed by: INTERNAL MEDICINE

## 2019-04-26 PROCEDURE — 77049 MRI BREAST C-+ W/CAD BI: CPT | Mod: 26,,, | Performed by: RADIOLOGY

## 2019-04-26 PROCEDURE — 63600175 PHARM REV CODE 636 W HCPCS: Mod: JG,PN | Performed by: INTERNAL MEDICINE

## 2019-04-26 PROCEDURE — 77049 MRI BREAST W/WO CONTRAST, W/CAD, BILATERAL: ICD-10-PCS | Mod: 26,,, | Performed by: RADIOLOGY

## 2019-04-26 RX ADMIN — GADOBENATE DIMEGLUMINE 14 ML: 529 INJECTION, SOLUTION INTRAVENOUS at 01:04

## 2019-04-26 RX ADMIN — LEUPROLIDE ACETATE 22.5 MG: KIT at 04:04

## 2019-04-26 NOTE — PROGRESS NOTES
History of present illness:  The patient is a 28-year-old white female well known to me for locally advanced left breast carcinoma which was ER positive, IL positive, HER2 Shantel negative and BRCA1/2-.  Patient received neoadjuvant therapy consisting of 4 cycles of Adriamycin/Cytoxan followed by 4 cycles of adjuvant Taxotere.  She underwent bilateral skin sparing mastectomies and implant reconstruction.  The patient had residual node positive disease at the time of resection.  She completed postoperative adjuvant XRT followed by 6 month course of adjuvant Xeloda.  She remains on adjuvant tamoxifen 20 mg daily.  Menstrual periods, though irregular, persist.  She seems to have 1 at least every 3rd month.  We have previously discussed ovarian suppression.  She is in favor of use of double Lupron as opposed to hysterectomy.  She and her  have successfully adopted a baby point.  Patient has noted a tight nodular area within her left axilla which is thought to be scar tissue but for which an MRI of the breast has been ordered.  Results are pending at the time of dictation.  No other complaints for pertinent findings on a 14 point review of systems.    Physical examination:  The patient is a well-developed, well-nourished, white female, in no acute distress, who has a weight of 158.5 lb (decreased by 2 lb).  VITAL SIGNS: Documented  and reviewed this visit.  HEENT: Normocephalic, atraumatic. Oral mucosa pink and moist. Lips without   lesions. Tongue midline. Oropharynx clear. Nonicteric sclerae.   NECK: Supple, no adenopathy. No carotid bruits, thyromegaly or thyroid nodule.   HEART: Regular rate and rhythm without murmur, gallop or rub.   LUNGS: Clear to auscultation bilaterally. Normal respiratory effort.   ABDOMEN: Soft, nontender, nondistended with positive normoactive bowel sounds,   no hepatosplenomegaly.   EXTREMITIES: No cyanosis, clubbing or edema. Distal pulses are intact.   AXILLAE AND GROIN: No palpable  pathologic lymphadenopathy is appreciated.   SKIN: Intact/turgor normal   NEUROLOGIC: Cranial nerves II-XII grossly intact. Motor: Good muscle bulk and   tone. Strength/sensory 5/5 throughout. Gait stable.   BREASTS:  Both surgically reconstructed.  There is a small, nodular focus, of what I believe to be scar tissue, which is palpable just inferior to the patient's surgical scar from left axillary lymph node sampling.    Impression:  1.  Locally advanced left breast carcinoma without evidence of disease recurrence.  2.  Irregular menses following receipt of chemotherapy.  3.  Hot flashes secondary to tamoxifen-mild/stable/remains on venlafaxine.  4.  Apparent left axillary scar tissue.    Plan:  1.  Continue tamoxifen 20 mg daily.  2.  Start Depot Lupron every 3 months for suppression of menses.  3.  Review results of breast MRI when available.  4.  Otherwise, return to clinic 3 months from now with interval CBC, CMP, LDH, and chest x-ray.    This note was created using voice recognition software and may contain grammatical errors.

## 2019-04-27 ENCOUNTER — PATIENT MESSAGE (OUTPATIENT)
Dept: HEMATOLOGY/ONCOLOGY | Facility: CLINIC | Age: 29
End: 2019-04-27

## 2019-04-29 DIAGNOSIS — C50.619 MALIGNANT NEOPLASM OF AXILLARY TAIL OF FEMALE BREAST, UNSPECIFIED ESTROGEN RECEPTOR STATUS, UNSPECIFIED LATERALITY: ICD-10-CM

## 2019-04-29 RX ORDER — DESVENLAFAXINE SUCCINATE 50 MG/1
50 TABLET, EXTENDED RELEASE ORAL DAILY
Qty: 90 TABLET | Refills: 1 | OUTPATIENT
Start: 2019-04-29 | End: 2019-10-26

## 2019-04-30 ENCOUNTER — PATIENT MESSAGE (OUTPATIENT)
Dept: HEMATOLOGY/ONCOLOGY | Facility: CLINIC | Age: 29
End: 2019-04-30

## 2019-04-30 DIAGNOSIS — C50.011 MALIGNANT NEOPLASM OF NIPPLE OF RIGHT BREAST IN FEMALE, UNSPECIFIED ESTROGEN RECEPTOR STATUS: ICD-10-CM

## 2019-05-01 RX ORDER — VENLAFAXINE 37.5 MG/1
37.5 TABLET ORAL DAILY
Qty: 90 TABLET | Refills: 3 | Status: SHIPPED | OUTPATIENT
Start: 2019-05-01 | End: 2019-08-13 | Stop reason: SDUPTHER

## 2019-05-26 ENCOUNTER — PATIENT MESSAGE (OUTPATIENT)
Dept: HEMATOLOGY/ONCOLOGY | Facility: CLINIC | Age: 29
End: 2019-05-26

## 2019-07-19 DIAGNOSIS — C50.212 MALIGNANT NEOPLASM OF UPPER-INNER QUADRANT OF LEFT FEMALE BREAST, UNSPECIFIED ESTROGEN RECEPTOR STATUS: ICD-10-CM

## 2019-07-19 RX ORDER — TAMOXIFEN CITRATE 20 MG/1
20 TABLET ORAL DAILY
Qty: 90 TABLET | Refills: 3 | Status: SHIPPED | OUTPATIENT
Start: 2019-07-19 | End: 2020-07-14 | Stop reason: SDUPTHER

## 2019-07-19 NOTE — TELEPHONE ENCOUNTER
Received fax from [facility] for refill on Tamoxifen 20 mg tabs.    Last OV: 4/26/19 with Dr. Gaming  Next OV: 7/26/19 with Dr. Amberly Gaming is out today do you want to refill this or have patient wait until her appt with Dr. Gaming, please advise. Thank you.

## 2019-07-26 ENCOUNTER — INFUSION (OUTPATIENT)
Dept: INFUSION THERAPY | Facility: HOSPITAL | Age: 29
End: 2019-07-26
Attending: INTERNAL MEDICINE
Payer: COMMERCIAL

## 2019-07-26 ENCOUNTER — HOSPITAL ENCOUNTER (OUTPATIENT)
Dept: RADIOLOGY | Facility: HOSPITAL | Age: 29
Discharge: HOME | End: 2019-07-26
Attending: INTERNAL MEDICINE
Payer: COMMERCIAL

## 2019-07-26 ENCOUNTER — OFFICE VISIT (OUTPATIENT)
Dept: HEMATOLOGY/ONCOLOGY | Facility: CLINIC | Age: 29
End: 2019-07-26
Payer: COMMERCIAL

## 2019-07-26 VITALS
SYSTOLIC BLOOD PRESSURE: 122 MMHG | HEIGHT: 63 IN | WEIGHT: 158.5 LBS | RESPIRATION RATE: 16 BRPM | TEMPERATURE: 98 F | OXYGEN SATURATION: 96 % | BODY MASS INDEX: 28.08 KG/M2 | HEART RATE: 93 BPM | DIASTOLIC BLOOD PRESSURE: 78 MMHG

## 2019-07-26 VITALS
DIASTOLIC BLOOD PRESSURE: 78 MMHG | HEART RATE: 93 BPM | BODY MASS INDEX: 28.08 KG/M2 | WEIGHT: 158.5 LBS | SYSTOLIC BLOOD PRESSURE: 122 MMHG | TEMPERATURE: 98 F | RESPIRATION RATE: 16 BRPM | HEIGHT: 63 IN

## 2019-07-26 DIAGNOSIS — N92.6 IRREGULAR MENSES: ICD-10-CM

## 2019-07-26 DIAGNOSIS — C77.9 REGIONAL LYMPH NODE METASTASIS PRESENT: ICD-10-CM

## 2019-07-26 DIAGNOSIS — R23.2 HOT FLASHES DUE TO TAMOXIFEN: ICD-10-CM

## 2019-07-26 DIAGNOSIS — Z17.0 MALIGNANT NEOPLASM OF UPPER-INNER QUADRANT OF LEFT BREAST IN FEMALE, ESTROGEN RECEPTOR POSITIVE: Primary | ICD-10-CM

## 2019-07-26 DIAGNOSIS — T45.1X5A HOT FLASHES DUE TO TAMOXIFEN: ICD-10-CM

## 2019-07-26 DIAGNOSIS — C50.212 MALIGNANT NEOPLASM OF UPPER-INNER QUADRANT OF LEFT BREAST IN FEMALE, ESTROGEN RECEPTOR POSITIVE: Primary | ICD-10-CM

## 2019-07-26 DIAGNOSIS — Z17.0 MALIGNANT NEOPLASM OF NIPPLE OF LEFT BREAST IN FEMALE, ESTROGEN RECEPTOR POSITIVE: Primary | ICD-10-CM

## 2019-07-26 DIAGNOSIS — C50.212 MALIGNANT NEOPLASM OF UPPER-INNER QUADRANT OF LEFT BREAST IN FEMALE, ESTROGEN RECEPTOR POSITIVE: ICD-10-CM

## 2019-07-26 DIAGNOSIS — Z17.0 MALIGNANT NEOPLASM OF UPPER-INNER QUADRANT OF LEFT BREAST IN FEMALE, ESTROGEN RECEPTOR POSITIVE: ICD-10-CM

## 2019-07-26 DIAGNOSIS — N91.1 AMENORRHEA, SECONDARY: ICD-10-CM

## 2019-07-26 DIAGNOSIS — C50.012 MALIGNANT NEOPLASM OF NIPPLE OF LEFT BREAST IN FEMALE, ESTROGEN RECEPTOR POSITIVE: Primary | ICD-10-CM

## 2019-07-26 PROCEDURE — 99999 PR PBB SHADOW E&M-EST. PATIENT-LVL III: ICD-10-PCS | Mod: PBBFAC,,, | Performed by: INTERNAL MEDICINE

## 2019-07-26 PROCEDURE — 99999 PR PBB SHADOW E&M-EST. PATIENT-LVL III: CPT | Mod: PBBFAC,,, | Performed by: INTERNAL MEDICINE

## 2019-07-26 PROCEDURE — 71046 X-RAY EXAM CHEST 2 VIEWS: CPT | Mod: 26,,, | Performed by: RADIOLOGY

## 2019-07-26 PROCEDURE — 99214 OFFICE O/P EST MOD 30 MIN: CPT | Mod: S$GLB,,, | Performed by: INTERNAL MEDICINE

## 2019-07-26 PROCEDURE — 71046 X-RAY EXAM CHEST 2 VIEWS: CPT | Mod: TC,FY,PO

## 2019-07-26 PROCEDURE — 99214 PR OFFICE/OUTPT VISIT, EST, LEVL IV, 30-39 MIN: ICD-10-PCS | Mod: S$GLB,,, | Performed by: INTERNAL MEDICINE

## 2019-07-26 PROCEDURE — 71046 XR CHEST PA AND LATERAL: ICD-10-PCS | Mod: 26,,, | Performed by: RADIOLOGY

## 2019-07-26 PROCEDURE — 96402 CHEMO HORMON ANTINEOPL SQ/IM: CPT | Mod: PN

## 2019-07-26 PROCEDURE — 63600175 PHARM REV CODE 636 W HCPCS: Mod: JG,PN | Performed by: INTERNAL MEDICINE

## 2019-07-26 RX ADMIN — LEUPROLIDE ACETATE 45 MG: KIT at 03:07

## 2019-07-26 NOTE — PROGRESS NOTES
History of present illness:  The patient is a 28-year-old white female well known to me for locally advanced left breast carcinoma which was ER positive, [place] positive, HER2 Shantel negative and BRCA1/2-.  Patient received neoadjuvant therapy consisting of 4 cycles of Adriamycin/Cytoxan followed by 4 cycles of adjuvant Taxotere.  She underwent bilateral skin sparing mastectomies and implant reconstruction.  The patient had residual node positive disease at the time of resection.  She completed postoperative adjuvant XRT followed by 6 month course of adjuvant Xeloda.  She remains on adjuvant tamoxifen 20 mg daily.  Menstrual periods, though irregular, were persistent and assess stated institution of depot Lupron.  Patient reports hot flashes and fatigue on this medication.  She is, however, amenorrhea.  She presents today with her adopted 5-month-old son.  No other complaints for pertinent findings on a 14 point review systems    Physical examination:  The patient is a well-developed, well-nourished, white female, in no acute distress, who has a weight of 158.5 lb (stable).  VITAL SIGNS: Documented  and reviewed this visit.  HEENT: Normocephalic, atraumatic. Oral mucosa pink and moist. Lips without   lesions. Tongue midline. Oropharynx clear. Nonicteric sclerae.   NECK: Supple, no adenopathy. No carotid bruits, thyromegaly or thyroid nodule.   HEART: Regular rate and rhythm without murmur, gallop or rub.   LUNGS: Clear to auscultation bilaterally. Normal respiratory effort.   ABDOMEN: Soft, nontender, nondistended with positive normoactive bowel sounds,   no hepatosplenomegaly.   EXTREMITIES: No cyanosis, clubbing or edema. Distal pulses are intact.   AXILLAE AND GROIN: No palpable pathologic lymphadenopathy is appreciated.   SKIN: Intact/turgor normal   NEUROLOGIC: Cranial nerves II-XII grossly intact. Motor: Good muscle bulk and   tone. Strength/sensory 5/5 throughout. Gait stable.   BREASTS:  Both surgically  reconstructed.  There is a small, nodular focus, of what I believe to be scar tissue, which is palpable just inferior to the patient's surgical scar from left axillary lymph node sampling.  It is unchanged from prior examination.    Laboratory:  White count 6.2, hemoglobin 12.5, hematocrit 35.5, platelets 187, absolute neutrophil count is 3400.  Sodium 138, potassium 4.1, chloride 104, CO2 26, BUN 11, creatinine 0.8, glucose 92, calcium 9.1, liver function test within normal limits, , GFR is greater than 60.    MRI of the breasts performed 04/26/2019:  No evidence of local disease recurrence appreciated.    Chest x-ray:  No evidence of acute cardiopulmonary infiltrate or occult pulmonary metastatic disease.    Impression:  1.  Locally advanced left breast carcinoma without evidence of disease recurrence.  2.  Amenorrhea on depot Lupron  3.  Hot flashes secondary to tamoxifen-mild/stable/remains on venlafaxine.  4.  Apparent left axillary scar tissue.    Plan:  1.  Continue tamoxifen 20 mg daily.  2.  Repeat  Depot Lupron every 3 months for suppression of menses.  3.  Return to clinic in 3 months without interval lab for next Lupron injection.  4.  Return to clinic in 6 months with interval CBC, CMP, and LDH for 18 months post therapy re-evaluation.    This note was created using voice recognition software and may contain grammatical errors.

## 2019-08-13 DIAGNOSIS — C50.011 MALIGNANT NEOPLASM OF NIPPLE OF RIGHT BREAST IN FEMALE, UNSPECIFIED ESTROGEN RECEPTOR STATUS: ICD-10-CM

## 2019-08-13 RX ORDER — VENLAFAXINE 37.5 MG/1
37.5 TABLET ORAL DAILY
Qty: 90 TABLET | Refills: 3 | Status: SHIPPED | OUTPATIENT
Start: 2019-08-13 | End: 2020-07-14 | Stop reason: SDUPTHER

## 2019-09-16 ENCOUNTER — PATIENT MESSAGE (OUTPATIENT)
Dept: HEMATOLOGY/ONCOLOGY | Facility: CLINIC | Age: 29
End: 2019-09-16

## 2019-10-25 ENCOUNTER — OFFICE VISIT (OUTPATIENT)
Dept: HEMATOLOGY/ONCOLOGY | Facility: CLINIC | Age: 29
End: 2019-10-25
Payer: COMMERCIAL

## 2019-10-25 DIAGNOSIS — R23.2 HOT FLASHES DUE TO TAMOXIFEN: ICD-10-CM

## 2019-10-25 DIAGNOSIS — C50.212 MALIGNANT NEOPLASM OF UPPER-INNER QUADRANT OF LEFT BREAST IN FEMALE, ESTROGEN RECEPTOR POSITIVE: Primary | ICD-10-CM

## 2019-10-25 DIAGNOSIS — N91.1 AMENORRHEA, SECONDARY: ICD-10-CM

## 2019-10-25 DIAGNOSIS — Z17.0 MALIGNANT NEOPLASM OF UPPER-INNER QUADRANT OF LEFT BREAST IN FEMALE, ESTROGEN RECEPTOR POSITIVE: Primary | ICD-10-CM

## 2019-10-25 DIAGNOSIS — T45.1X5A HOT FLASHES DUE TO TAMOXIFEN: ICD-10-CM

## 2019-10-25 DIAGNOSIS — C77.9 REGIONAL LYMPH NODE METASTASIS PRESENT: ICD-10-CM

## 2019-10-25 PROCEDURE — 99999 PR PBB SHADOW E&M-EST. PATIENT-LVL II: ICD-10-PCS | Mod: PBBFAC,,, | Performed by: INTERNAL MEDICINE

## 2019-10-25 PROCEDURE — 99213 PR OFFICE/OUTPT VISIT, EST, LEVL III, 20-29 MIN: ICD-10-PCS | Mod: S$GLB,,, | Performed by: INTERNAL MEDICINE

## 2019-10-25 PROCEDURE — 99999 PR PBB SHADOW E&M-EST. PATIENT-LVL II: CPT | Mod: PBBFAC,,, | Performed by: INTERNAL MEDICINE

## 2019-10-25 PROCEDURE — 99213 OFFICE O/P EST LOW 20 MIN: CPT | Mod: S$GLB,,, | Performed by: INTERNAL MEDICINE

## 2019-10-25 NOTE — PROGRESS NOTES
History of present illness:  The patient is a 28-year-old white female well known to me for locally advanced left breast carcinoma which was ER positive, [place] positive, HER2 Shantel negative and BRCA1/2-.  Patient received neoadjuvant therapy consisting of 4 cycles of Adriamycin/Cytoxan followed by 4 cycles of adjuvant Taxotere.  She underwent bilateral skin sparing mastectomies and implant reconstruction.  The patient had residual node positive disease at the time of resection.  She completed postoperative adjuvant XRT followed by 6 month course of adjuvant Xeloda.  She remains on adjuvant tamoxifen 20 mg daily.  Menstrual periods, though irregular, were persistent and assess stated institution of depot Lupron.  Patient reports hot flashes and fatigue on this medication.  She is, however, amenorrhea.  She presents today with her adopted son.  Tamoxifen associated hot flashes are stable.  Menses remain resolved since initiation of Lupron.  No new breast complaints, chest pain, abdominal pain, bone pain, unexplained weight loss.  No other complaints for pertinent findings on a 10 point review systems    Physical examination:  The patient is a well-developed, well-nourished, white female, in no acute distress, who has a weight of 158.5 lb (stable).  VITAL SIGNS: Documented  and reviewed this visit.  HEENT: Normocephalic, atraumatic. Oral mucosa pink and moist. Lips without   lesions. Tongue midline. Oropharynx clear. Nonicteric sclerae.   NECK: Supple, no adenopathy. No carotid bruits, thyromegaly or thyroid nodule.   HEART: Regular rate and rhythm without murmur, gallop or rub.   LUNGS: Clear to auscultation bilaterally. Normal respiratory effort.   ABDOMEN: Soft, nontender, nondistended with positive normoactive bowel sounds,   no hepatosplenomegaly.   EXTREMITIES: No cyanosis, clubbing or edema. Distal pulses are intact.   AXILLAE AND GROIN: No palpable pathologic lymphadenopathy is appreciated.   SKIN: Intact/turgor  normal   NEUROLOGIC: Cranial nerves II-XII grossly intact. Motor: Good muscle bulk and   tone. Strength/sensory 5/5 throughout. Gait stable.   BREASTS:  Both surgically reconstructed.  There is a small, nodular focus, of what I believe to be scar tissue, which is palpable just inferior to the patient's surgical scar from left axillary lymph node sampling.  It is unchanged from prior examination.    Impression:  1.  Locally advanced left breast carcinoma without evidence of disease recurrence.  2.  Amenorrhea on depot Lupron  3.  Hot flashes secondary to tamoxifen-mild/stable/remains on venlafaxine.  4.  Apparent left axillary scar tissue.    Plan:  1.  Continue tamoxifen 20 mg daily.  2.  Return to clinic in 3 months with interval CBC, CMP, and LDH for 18 months post therapy re-evaluation and next dose of Lupron.    This note was created using voice recognition software and may contain grammatical errors.

## 2019-10-28 ENCOUNTER — HOSPITAL ENCOUNTER (OUTPATIENT)
Dept: RADIOLOGY | Facility: HOSPITAL | Age: 29
Discharge: HOME | End: 2019-10-28
Attending: SURGERY
Payer: COMMERCIAL

## 2019-10-28 ENCOUNTER — OFFICE VISIT (OUTPATIENT)
Dept: SURGERY | Facility: CLINIC | Age: 29
End: 2019-10-28
Payer: COMMERCIAL

## 2019-10-28 VITALS
BODY MASS INDEX: 28.08 KG/M2 | WEIGHT: 158.5 LBS | HEART RATE: 101 BPM | DIASTOLIC BLOOD PRESSURE: 83 MMHG | SYSTOLIC BLOOD PRESSURE: 137 MMHG | HEIGHT: 63 IN

## 2019-10-28 DIAGNOSIS — Z85.3 PERSONAL HISTORY OF BREAST CANCER: ICD-10-CM

## 2019-10-28 DIAGNOSIS — N63.20 BREAST MASS, LEFT: ICD-10-CM

## 2019-10-28 DIAGNOSIS — N63.20 BREAST MASS, LEFT: Primary | ICD-10-CM

## 2019-10-28 PROCEDURE — 76642 ULTRASOUND BREAST LIMITED: CPT | Mod: 26,LT,, | Performed by: RADIOLOGY

## 2019-10-28 PROCEDURE — 76642 ULTRASOUND BREAST LIMITED: CPT | Mod: TC,PO,LT

## 2019-10-28 PROCEDURE — 99213 PR OFFICE/OUTPT VISIT, EST, LEVL III, 20-29 MIN: ICD-10-PCS | Mod: S$GLB,,, | Performed by: SURGERY

## 2019-10-28 PROCEDURE — 99999 PR PBB SHADOW E&M-EST. PATIENT-LVL III: CPT | Mod: PBBFAC,,, | Performed by: SURGERY

## 2019-10-28 PROCEDURE — 99213 OFFICE O/P EST LOW 20 MIN: CPT | Mod: S$GLB,,, | Performed by: SURGERY

## 2019-10-28 PROCEDURE — 76642 US BREAST LEFT LIMITED: ICD-10-PCS | Mod: 26,LT,, | Performed by: RADIOLOGY

## 2019-10-28 PROCEDURE — 99999 PR PBB SHADOW E&M-EST. PATIENT-LVL III: ICD-10-PCS | Mod: PBBFAC,,, | Performed by: SURGERY

## 2019-10-28 NOTE — PROGRESS NOTES
REFERRING PHYSICIAN:  No referring provider defined for this encounter.       Brenda Jaimes MD      DIAGNOSIS:    This is a 28 y.o. female with a stage IIB pT2 N1 M0 grade 2 ER + CT + HER2 negative of the left breast.    TREATMENT SUMMARY:  The patient is status post bilateral mastectomies and sentinel node biopsy on the left.  Final pathology showed invasive cancer 1.4cm with surrounding DCIS of 1.5cm and one lymph node with micrometastasis (0.3mm) on the left side. There were 4 additional lymph nodes negative for cancer. She was treated with 4 cycles of neoadjuvant Taxotere and four cycles of neoadjuvant Adriamycin/Cytoxan. Repeat PET scan showing resolution of breast mass and decreased size in left axillary node prior to having surgery.    INTERVAL HISTORY:   Berto Chun comes in for a follow up.  She denies fever, chills, chest pain or shortness of breath.  Her pain is well controlled.    She has complete radiation therapy and chemotherapy with adjuvant Xeloda.  Currently on anastrazole.  Started on debot lupron and is tolerating well with the only issue being hot flashes.  Denies any weight loss or bone/back pain.  No nipple discharge.  No new masses or skin changes.  MRI performed in April for breast tightness showed no masses or abnormalities.    Adopted  baby.  Remy Luna is 8 month old.      MEDICATIONS:  Current Outpatient Medications   Medication Sig Dispense Refill    ALPRAZolam (XANAX) 0.25 MG tablet Take 1 tablet (0.25 mg total) by mouth 3 (three) times daily as needed for Anxiety. 90 tablet 3    biotin 1 mg Cap Take by mouth.      bisacodyl (DULCOLAX) 5 mg EC tablet Take 5 mg by mouth daily as needed for Constipation.      clindamycin-tretinoin (ZIANA) gel Apply a thin coat to the affected area every night.      desvenlafaxine succinate (PRISTIQ) 50 MG Tb24 Take 1 tablet (50 mg total) by mouth once daily. 30 tablet 5    ondansetron (ZOFRAN-ODT) 8 MG TbDL       PNV#75/IRON  FUM/FA/OM3/DHA/EPA (ONE A DAY WOMEN'S PRENATAL DHA ORAL) Take 1 tablet by mouth once daily.      prochlorperazine (COMPAZINE) 10 MG tablet TK 1 T PO Q 6 H PRF NV  6    tamoxifen (NOLVADEX) 20 MG Tab Take 1 tablet (20 mg total) by mouth once daily. 90 tablet 3    venlafaxine (EFFEXOR) 37.5 MG Tab Take 1 tablet (37.5 mg total) by mouth once daily. 90 tablet 3     No current facility-administered medications for this visit.        ALLERGIES:   Review of patient's allergies indicates:  No Known Allergies    PHYSICAL EXAMINATION:   General:  This is a well appearing female with appropriate speech, affect and gait.     Breast:  All incisions have healed well.  The left breast is much harder related to post radiation changes.  There is a small pea sized palpable mass in the left breast at 1oc. No LAD.  Excellent cosmesis. There is decrease ROM left with some thickening laterally.    Imaging:  MRI-4/26/2019  Result:   MRI Breast w/wo Contrast, w/CAD, Bilateral     History:  Patient is 28 y.o. and is seen for personal history of breast cancer.     Films Compared:  Compared to: 11/15/2017 MRI Breast Bilateral     Technique:  A routine breast MRI was performed with a dedicated breast coil. Pre-contrast STIR were acquired. Then, pre and post contrast T1 weighted fat saturated images were acquired and subtracted with MIP reconstruction. 14 ml of intravenous gadolinium contrast was administered. The study was reviewed with [employer] software.     Findings:  The breasts are almost entirely fatty. The background parenchymal enhancement is minimal and symmetric. The implants are intact.      No significant masses, enhancements, or other abnormal findings are seen.     The implants are intact. The patient is status post bilateral mastectomy.  There are no axillary abnormalities.       Impression:  No significant masses, enhancements, or other abnormal findings are seen. A benign or negative imaging result should not preclude further  clinical investigation of clinically suspicious symptoms.      BI-RADS Category:   Overall: 1 - Negative    10/28/2019  Result:   US Breast Left Limited     History:  Patient is 28 y.o. and is seen for a diagnostic ultrasound. History of bilateral mastectomy for left breast cancer. New palpable abnormality left breast.     Films Compared:  Prior images (if available) were compared.     Findings:     There is a 5 mm simple cyst seen in the left breast at 1 o'clock, 4 cm from the nipple. The cyst correlates with the palpable mass noted during physical examination and reported by the patient. Finding consistent with a benign cyst/oil cyst. Patient declined mammogram today.      Impression:  There is no mammographic evidence of malignancy.     BI-RADS Category:   Left: 2 - Benign  Overall: 2 - Benign    IMPRESSION:   BREANNA    PLAN:   rtc 6 months  Will get US of left breast to evaluate: findings: show benign cyst  Patient can always call or schedule an appointment with any new findings or concerns

## 2019-10-29 ENCOUNTER — TELEPHONE (OUTPATIENT)
Dept: HEMATOLOGY/ONCOLOGY | Facility: CLINIC | Age: 29
End: 2019-10-29

## 2019-10-29 NOTE — TELEPHONE ENCOUNTER
Call placed, LVM to return call.    ----- Message from Reina Grimm sent at 10/29/2019 11:21 AM CDT -----  Contact: [employer]  Type: Needs Medical Advice    Who Called:  karyn  Symptoms (please be specific):  na  How long has patient had these symptoms:  michelle  Pharmacy name and phone #:  michelle  Best Call Back Number: [phone] ext [phone]  Additional Information: Karyn states that patient is in need of being placed on disability and Karyn has questions for office .  Please call to advise. Thanks!

## 2019-11-01 ENCOUNTER — TELEPHONE (OUTPATIENT)
Dept: HEMATOLOGY/ONCOLOGY | Facility: CLINIC | Age: 29
End: 2019-11-01

## 2019-11-01 NOTE — TELEPHONE ENCOUNTER
ADRIANNEM for caller to return call.    ----- Message from Diane Leal sent at 11/1/2019 11:00 AM CDT -----  Type: Needs Medical Advice    Who Called:  [employer] absent management - [employer]   Symptoms (please be specific):  NA  How long has patient had these symptoms:  Pharmacy name and phone #: NA  Best Call Back Number: [phone]  Additional Information: Caller asking for information for short term disability claim for above listed pt.

## 2019-11-06 ENCOUNTER — TELEPHONE (OUTPATIENT)
Dept: HEMATOLOGY/ONCOLOGY | Facility: CLINIC | Age: 29
End: 2019-11-06

## 2019-11-06 NOTE — TELEPHONE ENCOUNTER
Returned call to [employer], no answer at provided extension, left detailed vm that our NN will be back tomorrow and I will have her call back

## 2019-11-06 NOTE — TELEPHONE ENCOUNTER
----- Message from Jeanette Denis sent at 11/6/2019 10:05 AM CST -----  Contact: eds with [employer] disability claims dept ph#[phone] ext [phone]  eds with [employer] disability claims dept ph#[phone] ext [phone]  Requesting information for short term disability, need diagnoses, disability dates   Fax# [phone]

## 2020-01-27 ENCOUNTER — INFUSION (OUTPATIENT)
Dept: INFUSION THERAPY | Facility: HOSPITAL | Age: 30
End: 2020-01-27
Attending: INTERNAL MEDICINE
Payer: COMMERCIAL

## 2020-01-27 ENCOUNTER — OFFICE VISIT (OUTPATIENT)
Dept: HEMATOLOGY/ONCOLOGY | Facility: CLINIC | Age: 30
End: 2020-01-27
Payer: COMMERCIAL

## 2020-01-27 ENCOUNTER — LAB VISIT (OUTPATIENT)
Dept: LAB | Facility: HOSPITAL | Age: 30
End: 2020-01-27
Attending: INTERNAL MEDICINE
Payer: COMMERCIAL

## 2020-01-27 ENCOUNTER — DOCUMENTATION ONLY (OUTPATIENT)
Dept: INFUSION THERAPY | Facility: HOSPITAL | Age: 30
End: 2020-01-27

## 2020-01-27 VITALS
DIASTOLIC BLOOD PRESSURE: 74 MMHG | TEMPERATURE: 98 F | HEIGHT: 63 IN | OXYGEN SATURATION: 98 % | WEIGHT: 168.63 LBS | BODY MASS INDEX: 29.88 KG/M2 | HEART RATE: 85 BPM | RESPIRATION RATE: 18 BRPM | SYSTOLIC BLOOD PRESSURE: 118 MMHG

## 2020-01-27 VITALS
TEMPERATURE: 98 F | HEART RATE: 85 BPM | DIASTOLIC BLOOD PRESSURE: 74 MMHG | SYSTOLIC BLOOD PRESSURE: 118 MMHG | RESPIRATION RATE: 18 BRPM

## 2020-01-27 DIAGNOSIS — Z17.0 MALIGNANT NEOPLASM OF UPPER-INNER QUADRANT OF LEFT BREAST IN FEMALE, ESTROGEN RECEPTOR POSITIVE: Primary | ICD-10-CM

## 2020-01-27 DIAGNOSIS — R63.5 WEIGHT GAIN: ICD-10-CM

## 2020-01-27 DIAGNOSIS — R23.2 HOT FLASHES DUE TO TAMOXIFEN: ICD-10-CM

## 2020-01-27 DIAGNOSIS — N91.1 AMENORRHEA, SECONDARY: ICD-10-CM

## 2020-01-27 DIAGNOSIS — T45.1X5A HOT FLASHES DUE TO TAMOXIFEN: ICD-10-CM

## 2020-01-27 DIAGNOSIS — Z17.0 MALIGNANT NEOPLASM OF UPPER-INNER QUADRANT OF LEFT BREAST IN FEMALE, ESTROGEN RECEPTOR POSITIVE: ICD-10-CM

## 2020-01-27 DIAGNOSIS — C50.212 MALIGNANT NEOPLASM OF UPPER-INNER QUADRANT OF LEFT BREAST IN FEMALE, ESTROGEN RECEPTOR POSITIVE: ICD-10-CM

## 2020-01-27 DIAGNOSIS — C50.212 MALIGNANT NEOPLASM OF UPPER-INNER QUADRANT OF LEFT BREAST IN FEMALE, ESTROGEN RECEPTOR POSITIVE: Primary | ICD-10-CM

## 2020-01-27 DIAGNOSIS — C50.012 MALIGNANT NEOPLASM OF NIPPLE OF LEFT BREAST IN FEMALE, ESTROGEN RECEPTOR POSITIVE: Primary | ICD-10-CM

## 2020-01-27 DIAGNOSIS — C77.9 REGIONAL LYMPH NODE METASTASIS PRESENT: ICD-10-CM

## 2020-01-27 DIAGNOSIS — Z17.0 MALIGNANT NEOPLASM OF NIPPLE OF LEFT BREAST IN FEMALE, ESTROGEN RECEPTOR POSITIVE: Primary | ICD-10-CM

## 2020-01-27 DIAGNOSIS — R53.83 FATIGUE, UNSPECIFIED TYPE: ICD-10-CM

## 2020-01-27 LAB
ALBUMIN SERPL BCP-MCNC: 4.5 G/DL (ref 3.5–5.2)
ALP SERPL-CCNC: 76 U/L (ref 38–145)
ALT SERPL W/O P-5'-P-CCNC: 14 U/L (ref 0–35)
ANION GAP SERPL CALC-SCNC: 9 MMOL/L (ref 8–16)
AST SERPL-CCNC: 27 U/L (ref 14–36)
BASOPHILS # BLD AUTO: 0.03 K/UL (ref 0–0.2)
BASOPHILS NFR BLD: 0.4 % (ref 0–1.9)
BILIRUB SERPL-MCNC: 0.3 MG/DL (ref 0.2–1.3)
BUN SERPL-MCNC: 10 MG/DL (ref 7–18)
CALCIUM SERPL-MCNC: 9 MG/DL (ref 8.4–10.2)
CHLORIDE SERPL-SCNC: 104 MMOL/L (ref 95–110)
CO2 SERPL-SCNC: 27 MMOL/L (ref 22–31)
CREAT SERPL-MCNC: 0.7 MG/DL (ref 0.5–1.4)
DIFFERENTIAL METHOD: NORMAL
EOSINOPHIL # BLD AUTO: 0.1 K/UL (ref 0–0.5)
EOSINOPHIL NFR BLD: 1.5 % (ref 0–8)
ERYTHROCYTE [DISTWIDTH] IN BLOOD BY AUTOMATED COUNT: 11.9 % (ref 11.5–14.5)
EST. GFR  (AFRICAN AMERICAN): >60 ML/MIN/1.73 M^2
EST. GFR  (NON AFRICAN AMERICAN): >60 ML/MIN/1.73 M^2
GLUCOSE SERPL-MCNC: 92 MG/DL (ref 70–110)
HCT VFR BLD AUTO: 37.2 % (ref 37–48.5)
HGB BLD-MCNC: 12.5 G/DL (ref 12–16)
IMM GRANULOCYTES # BLD AUTO: 0.01 K/UL (ref 0–0.04)
IMM GRANULOCYTES NFR BLD AUTO: 0.1 % (ref 0–0.5)
LDH SERPL L TO P-CCNC: 394 U/L (ref 313–618)
LYMPHOCYTES # BLD AUTO: 2.4 K/UL (ref 1–4.8)
LYMPHOCYTES NFR BLD: 33 % (ref 18–48)
MAGNESIUM SERPL-MCNC: 2 MG/DL (ref 1.6–2.6)
MCH RBC QN AUTO: 30.3 PG (ref 27–31)
MCHC RBC AUTO-ENTMCNC: 33.6 G/DL (ref 32–36)
MCV RBC AUTO: 90 FL (ref 82–98)
MONOCYTES # BLD AUTO: 0.4 K/UL (ref 0.3–1)
MONOCYTES NFR BLD: 5.7 % (ref 4–15)
NEUTROPHILS # BLD AUTO: 4.3 K/UL (ref 1.8–7.7)
NEUTROPHILS NFR BLD: 59.3 % (ref 38–73)
NRBC BLD-RTO: 0 /100 WBC
PLATELET # BLD AUTO: 189 K/UL (ref 150–350)
PMV BLD AUTO: 9.3 FL (ref 9.2–12.9)
POTASSIUM SERPL-SCNC: 4.5 MMOL/L (ref 3.5–5.1)
PROT SERPL-MCNC: 7.8 G/DL (ref 6–8.4)
RBC # BLD AUTO: 4.13 M/UL (ref 4–5.4)
SODIUM SERPL-SCNC: 140 MMOL/L (ref 136–145)
WBC # BLD AUTO: 7.31 K/UL (ref 3.9–12.7)

## 2020-01-27 PROCEDURE — 99999 PR PBB SHADOW E&M-EST. PATIENT-LVL IV: CPT | Mod: PBBFAC,,, | Performed by: INTERNAL MEDICINE

## 2020-01-27 PROCEDURE — 3008F PR BODY MASS INDEX (BMI) DOCUMENTED: ICD-10-PCS | Mod: CPTII,S$GLB,, | Performed by: INTERNAL MEDICINE

## 2020-01-27 PROCEDURE — 99214 OFFICE O/P EST MOD 30 MIN: CPT | Mod: S$GLB,,, | Performed by: INTERNAL MEDICINE

## 2020-01-27 PROCEDURE — 80053 COMPREHEN METABOLIC PANEL: CPT | Mod: PN

## 2020-01-27 PROCEDURE — 3008F BODY MASS INDEX DOCD: CPT | Mod: CPTII,S$GLB,, | Performed by: INTERNAL MEDICINE

## 2020-01-27 PROCEDURE — 99999 PR PBB SHADOW E&M-EST. PATIENT-LVL IV: ICD-10-PCS | Mod: PBBFAC,,, | Performed by: INTERNAL MEDICINE

## 2020-01-27 PROCEDURE — 96402 CHEMO HORMON ANTINEOPL SQ/IM: CPT | Mod: PN

## 2020-01-27 PROCEDURE — 63600175 PHARM REV CODE 636 W HCPCS: Mod: JG,PN | Performed by: INTERNAL MEDICINE

## 2020-01-27 PROCEDURE — 83735 ASSAY OF MAGNESIUM: CPT

## 2020-01-27 PROCEDURE — 83615 LACTATE (LD) (LDH) ENZYME: CPT

## 2020-01-27 PROCEDURE — 85025 COMPLETE CBC W/AUTO DIFF WBC: CPT | Mod: PN

## 2020-01-27 PROCEDURE — 99214 PR OFFICE/OUTPT VISIT, EST, LEVL IV, 30-39 MIN: ICD-10-PCS | Mod: S$GLB,,, | Performed by: INTERNAL MEDICINE

## 2020-01-27 PROCEDURE — 85025 COMPLETE CBC W/AUTO DIFF WBC: CPT

## 2020-01-27 PROCEDURE — 36415 COLL VENOUS BLD VENIPUNCTURE: CPT | Mod: PN

## 2020-01-27 PROCEDURE — 83615 LACTATE (LD) (LDH) ENZYME: CPT | Mod: PN

## 2020-01-27 PROCEDURE — 83735 ASSAY OF MAGNESIUM: CPT | Mod: PN

## 2020-01-27 PROCEDURE — 80053 COMPREHEN METABOLIC PANEL: CPT

## 2020-01-27 RX ADMIN — LEUPROLIDE ACETATE 45 MG: KIT at 03:01

## 2020-01-27 NOTE — PROGRESS NOTES
History of present illness:  The patient is a 28-year-old white female well known to me for locally advanced left breast carcinoma which was ER positive, [place] positive, HER2 Shantel negative and BRCA1/2-.  Patient received neoadjuvant therapy consisting of 4 cycles of Adriamycin/Cytoxan followed by 4 cycles of adjuvant Taxotere.  She underwent bilateral skin sparing mastectomies and implant reconstruction.  The patient had residual node positive disease at the time of resection.  She completed postoperative adjuvant XRT followed by 6 month course of adjuvant Xeloda.  She remains on adjuvant tamoxifen 20 mg daily.  Menstrual periods, had been irregular, but persistent following chemotherapy and necesitated institution of depot Lupron.  Patient experiences hot flashes on this medication.  They are stable.  She is frustrated by 20 lb weight gain.  However, she consumes soft drinks regularly and is not actively exercising.  Patient is 18 months from completion of adjuvant Xeloda and returns for surveillance.  No other complaints for pertinent findings on a 10 point review systems    Physical examination:  The patient is a well-developed, well-nourished, white female, in no acute distress, who has a weight of 168.5 lb (increased by 10 lb).  VITAL SIGNS: Documented  and reviewed this visit.  HEENT: Normocephalic, atraumatic. Oral mucosa pink and moist. Lips without   lesions. Tongue midline. Oropharynx clear. Nonicteric sclerae.   NECK: Supple, no adenopathy. No carotid bruits, thyromegaly or thyroid nodule.   HEART: Regular rate and rhythm without murmur, gallop or rub.   LUNGS: Clear to auscultation bilaterally. Normal respiratory effort.   ABDOMEN: Soft, nontender, nondistended with positive normoactive bowel sounds,   no hepatosplenomegaly.   EXTREMITIES: No cyanosis, clubbing or edema. Distal pulses are intact.   AXILLAE AND GROIN: No palpable pathologic lymphadenopathy is appreciated.   SKIN: Intact/turgor normal    NEUROLOGIC: Cranial nerves II-XII grossly intact. Motor: Good muscle bulk and   tone. Strength/sensory 5/5 throughout. Gait stable.   BREASTS:  Both surgically reconstructed.  There are no signs of local recurrence.    Laboratory:  White count 7.3, hemoglobin 12.5, hematocrit 37.2, platelets 189, absolute neutrophil count is 4300.  Sodium 140, potassium 4.5, chloride 104, CO2 27, BUN 10, creatinine 0.7, glucose 92, calcium 9, magnesium 2.0, liver function test within normal limits, , GFR is greater than 60.    Impression:  1.  Locally advanced left breast carcinoma without evidence of disease recurrence.  2.  Amenorrhea on depot Lupron  3.  Hot flashes secondary to tamoxifen-mild/stable/remains on venlafaxine.  4.  Weight gain/fatigue.    Plan:  1.  Continue tamoxifen 20 mg daily.  2.  Repeat Lupron 45 mg IM.  3.  Return to clinic in 6 months with interval CBC, CMP, and LDH for 24 month post therapy re-evaluation and next dose of Lupron.  4.  Counseled regarding resumption of regular exercise and limiting carbohydrate consumption to assist with weight gain/fatigue.    This note was created using voice recognition software and may contain grammatical errors.

## 2020-01-27 NOTE — PROGRESS NOTES
Per Kasandra, move pts appt for 7/27 Lupron to 7/31.  Pt to be provided with schedule prior to d/c.

## 2020-07-14 DIAGNOSIS — C50.212 MALIGNANT NEOPLASM OF UPPER-INNER QUADRANT OF LEFT FEMALE BREAST, UNSPECIFIED ESTROGEN RECEPTOR STATUS: ICD-10-CM

## 2020-07-14 DIAGNOSIS — C50.011 MALIGNANT NEOPLASM OF NIPPLE OF RIGHT BREAST IN FEMALE, UNSPECIFIED ESTROGEN RECEPTOR STATUS: ICD-10-CM

## 2020-07-14 RX ORDER — TAMOXIFEN CITRATE 20 MG/1
20 TABLET ORAL DAILY
Qty: 90 TABLET | Refills: 3 | Status: SHIPPED | OUTPATIENT
Start: 2020-07-14 | End: 2020-07-22 | Stop reason: SDUPTHER

## 2020-07-14 RX ORDER — VENLAFAXINE 37.5 MG/1
37.5 TABLET ORAL DAILY
Qty: 90 TABLET | Refills: 3 | Status: SHIPPED | OUTPATIENT
Start: 2020-07-14 | End: 2020-07-22 | Stop reason: SDUPTHER

## 2020-07-22 DIAGNOSIS — C50.011 MALIGNANT NEOPLASM OF NIPPLE OF RIGHT BREAST IN FEMALE, UNSPECIFIED ESTROGEN RECEPTOR STATUS: ICD-10-CM

## 2020-07-22 DIAGNOSIS — C50.212 MALIGNANT NEOPLASM OF UPPER-INNER QUADRANT OF LEFT FEMALE BREAST, UNSPECIFIED ESTROGEN RECEPTOR STATUS: ICD-10-CM

## 2020-07-22 RX ORDER — VENLAFAXINE 37.5 MG/1
37.5 TABLET ORAL DAILY
Qty: 90 TABLET | Refills: 3 | Status: SHIPPED | OUTPATIENT
Start: 2020-07-22 | End: 2021-01-26 | Stop reason: SDUPTHER

## 2020-07-22 RX ORDER — TAMOXIFEN CITRATE 20 MG/1
20 TABLET ORAL DAILY
Qty: 90 TABLET | Refills: 3 | Status: SHIPPED | OUTPATIENT
Start: 2020-07-22 | End: 2021-05-05 | Stop reason: SDUPTHER

## 2020-07-31 ENCOUNTER — OFFICE VISIT (OUTPATIENT)
Dept: HEMATOLOGY/ONCOLOGY | Facility: CLINIC | Age: 30
End: 2020-07-31
Payer: COMMERCIAL

## 2020-07-31 ENCOUNTER — LAB VISIT (OUTPATIENT)
Dept: LAB | Facility: HOSPITAL | Age: 30
End: 2020-07-31
Attending: INTERNAL MEDICINE
Payer: COMMERCIAL

## 2020-07-31 ENCOUNTER — INFUSION (OUTPATIENT)
Dept: INFUSION THERAPY | Facility: HOSPITAL | Age: 30
End: 2020-07-31
Attending: INTERNAL MEDICINE
Payer: COMMERCIAL

## 2020-07-31 VITALS
TEMPERATURE: 98 F | DIASTOLIC BLOOD PRESSURE: 93 MMHG | HEART RATE: 64 BPM | RESPIRATION RATE: 20 BRPM | SYSTOLIC BLOOD PRESSURE: 122 MMHG

## 2020-07-31 VITALS
DIASTOLIC BLOOD PRESSURE: 93 MMHG | WEIGHT: 178.13 LBS | HEART RATE: 64 BPM | RESPIRATION RATE: 13 BRPM | TEMPERATURE: 98 F | BODY MASS INDEX: 31.56 KG/M2 | OXYGEN SATURATION: 98 % | SYSTOLIC BLOOD PRESSURE: 122 MMHG | HEIGHT: 63 IN

## 2020-07-31 DIAGNOSIS — Z17.0 MALIGNANT NEOPLASM OF UPPER-INNER QUADRANT OF LEFT BREAST IN FEMALE, ESTROGEN RECEPTOR POSITIVE: Primary | ICD-10-CM

## 2020-07-31 DIAGNOSIS — R53.83 FATIGUE, UNSPECIFIED TYPE: ICD-10-CM

## 2020-07-31 DIAGNOSIS — C50.212 MALIGNANT NEOPLASM OF UPPER-INNER QUADRANT OF LEFT BREAST IN FEMALE, ESTROGEN RECEPTOR POSITIVE: Primary | ICD-10-CM

## 2020-07-31 DIAGNOSIS — R23.2 HOT FLASHES DUE TO TAMOXIFEN: ICD-10-CM

## 2020-07-31 DIAGNOSIS — R63.5 WEIGHT GAIN: ICD-10-CM

## 2020-07-31 DIAGNOSIS — N91.1 AMENORRHEA, SECONDARY: ICD-10-CM

## 2020-07-31 DIAGNOSIS — C77.9 REGIONAL LYMPH NODE METASTASIS PRESENT: ICD-10-CM

## 2020-07-31 DIAGNOSIS — R53.83 FATIGUE, UNSPECIFIED TYPE: Primary | ICD-10-CM

## 2020-07-31 DIAGNOSIS — C50.212 MALIGNANT NEOPLASM OF UPPER-INNER QUADRANT OF LEFT BREAST IN FEMALE, ESTROGEN RECEPTOR POSITIVE: ICD-10-CM

## 2020-07-31 DIAGNOSIS — T45.1X5A HOT FLASHES DUE TO TAMOXIFEN: ICD-10-CM

## 2020-07-31 DIAGNOSIS — Z17.0 MALIGNANT NEOPLASM OF NIPPLE OF LEFT BREAST IN FEMALE, ESTROGEN RECEPTOR POSITIVE: Primary | ICD-10-CM

## 2020-07-31 DIAGNOSIS — Z17.0 MALIGNANT NEOPLASM OF UPPER-INNER QUADRANT OF LEFT BREAST IN FEMALE, ESTROGEN RECEPTOR POSITIVE: ICD-10-CM

## 2020-07-31 DIAGNOSIS — C50.012 MALIGNANT NEOPLASM OF NIPPLE OF LEFT BREAST IN FEMALE, ESTROGEN RECEPTOR POSITIVE: Primary | ICD-10-CM

## 2020-07-31 LAB
ALBUMIN SERPL BCP-MCNC: 4.5 G/DL (ref 3.5–5.2)
ALP SERPL-CCNC: 82 U/L (ref 38–145)
ALT SERPL W/O P-5'-P-CCNC: 20 U/L (ref 0–35)
ANION GAP SERPL CALC-SCNC: 7 MMOL/L (ref 8–16)
AST SERPL-CCNC: 37 U/L (ref 14–36)
BASOPHILS # BLD AUTO: 0.04 K/UL (ref 0–0.2)
BASOPHILS NFR BLD: 0.6 % (ref 0–1.9)
BILIRUB SERPL-MCNC: 0.4 MG/DL (ref 0.2–1.3)
BUN SERPL-MCNC: 10 MG/DL (ref 7–18)
CALCIUM SERPL-MCNC: 9.3 MG/DL (ref 8.4–10.2)
CHLORIDE SERPL-SCNC: 105 MMOL/L (ref 95–110)
CO2 SERPL-SCNC: 26 MMOL/L (ref 22–31)
CREAT SERPL-MCNC: 0.84 MG/DL (ref 0.5–1.4)
DIFFERENTIAL METHOD: NORMAL
EOSINOPHIL # BLD AUTO: 0.2 K/UL (ref 0–0.5)
EOSINOPHIL NFR BLD: 2.5 % (ref 0–8)
ERYTHROCYTE [DISTWIDTH] IN BLOOD BY AUTOMATED COUNT: 12.3 % (ref 11.5–14.5)
EST. GFR  (AFRICAN AMERICAN): >60 ML/MIN/1.73 M^2
EST. GFR  (NON AFRICAN AMERICAN): >60 ML/MIN/1.73 M^2
GLUCOSE SERPL-MCNC: 120 MG/DL (ref 70–110)
HCT VFR BLD AUTO: 38.6 % (ref 37–48.5)
HGB BLD-MCNC: 12.9 G/DL (ref 12–16)
IMM GRANULOCYTES # BLD AUTO: 0.01 K/UL (ref 0–0.04)
IMM GRANULOCYTES NFR BLD AUTO: 0.1 % (ref 0–0.5)
LDH SERPL L TO P-CCNC: 447 U/L (ref 313–618)
LYMPHOCYTES # BLD AUTO: 2.5 K/UL (ref 1–4.8)
LYMPHOCYTES NFR BLD: 34.6 % (ref 18–48)
MCH RBC QN AUTO: 30.4 PG (ref 27–31)
MCHC RBC AUTO-ENTMCNC: 33.4 G/DL (ref 32–36)
MCV RBC AUTO: 91 FL (ref 82–98)
MONOCYTES # BLD AUTO: 0.7 K/UL (ref 0.3–1)
MONOCYTES NFR BLD: 9.6 % (ref 4–15)
NEUTROPHILS # BLD AUTO: 3.7 K/UL (ref 1.8–7.7)
NEUTROPHILS NFR BLD: 52.6 % (ref 38–73)
NRBC BLD-RTO: 0 /100 WBC
PLATELET # BLD AUTO: 222 K/UL (ref 150–350)
PMV BLD AUTO: 9.3 FL (ref 9.2–12.9)
POTASSIUM SERPL-SCNC: 4.5 MMOL/L (ref 3.5–5.1)
PROT SERPL-MCNC: 8 G/DL (ref 6–8.4)
RBC # BLD AUTO: 4.25 M/UL (ref 4–5.4)
SODIUM SERPL-SCNC: 138 MMOL/L (ref 136–145)
T4 FREE SP9 P CHAL SERPL-SCNC: 0.85 NG/DL (ref 0.78–2.19)
TSH SERPL DL<=0.005 MIU/L-ACNC: 1.79 UIU/ML (ref 0.4–4)
WBC # BLD AUTO: 7.1 K/UL (ref 3.9–12.7)

## 2020-07-31 PROCEDURE — 85025 COMPLETE CBC W/AUTO DIFF WBC: CPT

## 2020-07-31 PROCEDURE — 99999 PR PBB SHADOW E&M-EST. PATIENT-LVL IV: CPT | Mod: PBBFAC,,, | Performed by: INTERNAL MEDICINE

## 2020-07-31 PROCEDURE — 99214 PR OFFICE/OUTPT VISIT, EST, LEVL IV, 30-39 MIN: ICD-10-PCS | Mod: S$GLB,,, | Performed by: INTERNAL MEDICINE

## 2020-07-31 PROCEDURE — 84443 ASSAY THYROID STIM HORMONE: CPT

## 2020-07-31 PROCEDURE — 83615 LACTATE (LD) (LDH) ENZYME: CPT

## 2020-07-31 PROCEDURE — 99214 OFFICE O/P EST MOD 30 MIN: CPT | Mod: S$GLB,,, | Performed by: INTERNAL MEDICINE

## 2020-07-31 PROCEDURE — 3008F BODY MASS INDEX DOCD: CPT | Mod: CPTII,S$GLB,, | Performed by: INTERNAL MEDICINE

## 2020-07-31 PROCEDURE — 96402 CHEMO HORMON ANTINEOPL SQ/IM: CPT | Mod: PN

## 2020-07-31 PROCEDURE — 80053 COMPREHEN METABOLIC PANEL: CPT | Mod: PN

## 2020-07-31 PROCEDURE — 84439 ASSAY OF FREE THYROXINE: CPT

## 2020-07-31 PROCEDURE — 63600175 PHARM REV CODE 636 W HCPCS: Mod: JG,PN | Performed by: INTERNAL MEDICINE

## 2020-07-31 PROCEDURE — 36415 COLL VENOUS BLD VENIPUNCTURE: CPT | Mod: PN

## 2020-07-31 PROCEDURE — 84439 ASSAY OF FREE THYROXINE: CPT | Mod: PN

## 2020-07-31 PROCEDURE — 99999 PR PBB SHADOW E&M-EST. PATIENT-LVL IV: ICD-10-PCS | Mod: PBBFAC,,, | Performed by: INTERNAL MEDICINE

## 2020-07-31 PROCEDURE — 80053 COMPREHEN METABOLIC PANEL: CPT

## 2020-07-31 PROCEDURE — 85025 COMPLETE CBC W/AUTO DIFF WBC: CPT | Mod: PN

## 2020-07-31 PROCEDURE — 3008F PR BODY MASS INDEX (BMI) DOCUMENTED: ICD-10-PCS | Mod: CPTII,S$GLB,, | Performed by: INTERNAL MEDICINE

## 2020-07-31 PROCEDURE — 83615 LACTATE (LD) (LDH) ENZYME: CPT | Mod: PN

## 2020-07-31 PROCEDURE — 84443 ASSAY THYROID STIM HORMONE: CPT | Mod: PN

## 2020-07-31 RX ADMIN — LEUPROLIDE ACETATE 45 MG: KIT at 03:07

## 2020-07-31 NOTE — PROGRESS NOTES
History of present illness:  The patient is a 28-year-old white female well known to me for locally advanced left breast carcinoma which was ER positive, [place] positive, HER2 Shantel negative and BRCA1/2-.  Patient received neoadjuvant therapy consisting of 4 cycles of Adriamycin/Cytoxan followed by 4 cycles of adjuvant Taxotere.  She underwent bilateral skin sparing mastectomies and implant reconstruction.  The patient had residual node positive disease at the time of resection.  She completed postoperative adjuvant XRT followed by 6 month course of adjuvant Xeloda.  She remains on adjuvant tamoxifen 20 mg daily.  Menstrual periods, had been irregular, but persistent following chemotherapy and necesitated institution of depot Lupron.  Patient experiences hot flashes on this medication.  They are stable and average approximately 2 per day. Patient is 24 months from completion of adjuvant Xeloda and returns for surveillance.  She continues to struggle with weight gain and generalized fatigue.  She feels unable exercise due to these symptoms.  No other complaints for pertinent findings on a 14 point review systems    Physical examination:  The patient is a well-developed, well-nourished, white female, in no acute distress, who has a weight of 178 lb (increased by 9.5 lb).  VITAL SIGNS: Documented  and reviewed this visit.  HEENT: Normocephalic, atraumatic. Oral mucosa pink and moist. Lips without   lesions. Tongue midline. Oropharynx clear. Nonicteric sclerae.   NECK: Supple, no adenopathy. No carotid bruits, thyromegaly or thyroid nodule.   HEART: Regular rate and rhythm without murmur, gallop or rub.   LUNGS: Clear to auscultation bilaterally. Normal respiratory effort.   ABDOMEN: Soft, nontender, nondistended with positive normoactive bowel sounds,   no hepatosplenomegaly.   EXTREMITIES: No cyanosis, clubbing or edema. Distal pulses are intact.   AXILLAE AND GROIN: No palpable pathologic lymphadenopathy is appreciated.    SKIN: Intact/turgor normal   NEUROLOGIC: Cranial nerves II-XII grossly intact. Motor: Good muscle bulk and   tone. Strength/sensory 5/5 throughout. Gait stable.   BREASTS:  Both surgically reconstructed.  There are no signs of local recurrence.    Laboratory:  White count 7.1, hemoglobin 12.9, hematocrit 38.6, platelets 222, absolute neutrophil count is 3700.  Sodium 138, potassium 4.5, chloride 105, CO2 26, BUN 10, creatinine 0.8, glucose 120, calcium 9.3, AST 37, ALT 20, , GFR greater than 60.    Impression:  1.  Locally advanced left breast carcinoma without evidence of disease recurrence.  2.  Amenorrhea on depot Lupron  3.  Hot flashes secondary to tamoxifen-mild/stable/remains on venlafaxine.  4.  Weight gain/fatigue.    Plan:  1.  Continue tamoxifen 20 mg daily.  2.  Repeat Lupron 45 mg IM.  3.  Return to clinic in 6 months with interval CBC, CMP, LDH and chest x-ray for 30 month post therapy re-evaluation and next dose of Lupron.  4.  Counseled regarding resumption of regular exercise and limiting carbohydrate consumption to assist with weight gain/fatigue.  5.  Check TSH and free T4.    This note was created using voice recognition software and may contain grammatical errors.

## 2021-01-26 ENCOUNTER — PATIENT MESSAGE (OUTPATIENT)
Dept: HEMATOLOGY/ONCOLOGY | Facility: CLINIC | Age: 31
End: 2021-01-26

## 2021-01-29 ENCOUNTER — PATIENT MESSAGE (OUTPATIENT)
Dept: HEMATOLOGY/ONCOLOGY | Facility: CLINIC | Age: 31
End: 2021-01-29

## 2021-01-29 ENCOUNTER — INFUSION (OUTPATIENT)
Dept: INFUSION THERAPY | Facility: HOSPITAL | Age: 31
End: 2021-01-29
Attending: NURSE PRACTITIONER
Payer: OTHER GOVERNMENT

## 2021-01-29 ENCOUNTER — OFFICE VISIT (OUTPATIENT)
Dept: HEMATOLOGY/ONCOLOGY | Facility: CLINIC | Age: 31
End: 2021-01-29
Payer: OTHER GOVERNMENT

## 2021-01-29 ENCOUNTER — HOSPITAL ENCOUNTER (OUTPATIENT)
Dept: RADIOLOGY | Facility: HOSPITAL | Age: 31
Discharge: HOME | End: 2021-01-29
Attending: INTERNAL MEDICINE
Payer: OTHER GOVERNMENT

## 2021-01-29 VITALS
BODY MASS INDEX: 30.23 KG/M2 | WEIGHT: 181.44 LBS | HEART RATE: 90 BPM | HEIGHT: 65 IN | DIASTOLIC BLOOD PRESSURE: 73 MMHG | OXYGEN SATURATION: 99 % | TEMPERATURE: 98 F | SYSTOLIC BLOOD PRESSURE: 101 MMHG | RESPIRATION RATE: 18 BRPM

## 2021-01-29 VITALS
HEART RATE: 97 BPM | RESPIRATION RATE: 16 BRPM | SYSTOLIC BLOOD PRESSURE: 115 MMHG | TEMPERATURE: 98 F | DIASTOLIC BLOOD PRESSURE: 75 MMHG

## 2021-01-29 DIAGNOSIS — C50.012 MALIGNANT NEOPLASM OF NIPPLE OF LEFT BREAST IN FEMALE, ESTROGEN RECEPTOR POSITIVE: Primary | ICD-10-CM

## 2021-01-29 DIAGNOSIS — T45.1X5A HOT FLASHES DUE TO TAMOXIFEN: ICD-10-CM

## 2021-01-29 DIAGNOSIS — C50.212 MALIGNANT NEOPLASM OF UPPER-INNER QUADRANT OF LEFT BREAST IN FEMALE, ESTROGEN RECEPTOR POSITIVE: ICD-10-CM

## 2021-01-29 DIAGNOSIS — Z17.0 MALIGNANT NEOPLASM OF UPPER-INNER QUADRANT OF LEFT BREAST IN FEMALE, ESTROGEN RECEPTOR POSITIVE: ICD-10-CM

## 2021-01-29 DIAGNOSIS — Z17.0 MALIGNANT NEOPLASM OF NIPPLE OF LEFT BREAST IN FEMALE, ESTROGEN RECEPTOR POSITIVE: Primary | ICD-10-CM

## 2021-01-29 DIAGNOSIS — R23.2 HOT FLASHES DUE TO TAMOXIFEN: ICD-10-CM

## 2021-01-29 PROCEDURE — 99214 PR OFFICE/OUTPT VISIT, EST, LEVL IV, 30-39 MIN: ICD-10-PCS | Mod: S$PBB,,, | Performed by: NURSE PRACTITIONER

## 2021-01-29 PROCEDURE — 99214 OFFICE O/P EST MOD 30 MIN: CPT | Mod: PBBFAC,25,PN | Performed by: NURSE PRACTITIONER

## 2021-01-29 PROCEDURE — 63600175 PHARM REV CODE 636 W HCPCS: Mod: JG,PN | Performed by: INTERNAL MEDICINE

## 2021-01-29 PROCEDURE — 99214 OFFICE O/P EST MOD 30 MIN: CPT | Mod: S$PBB,,, | Performed by: NURSE PRACTITIONER

## 2021-01-29 PROCEDURE — 71046 X-RAY EXAM CHEST 2 VIEWS: CPT | Mod: TC,FY,PO

## 2021-01-29 PROCEDURE — 99999 PR PBB SHADOW E&M-EST. PATIENT-LVL IV: CPT | Mod: PBBFAC,,, | Performed by: NURSE PRACTITIONER

## 2021-01-29 PROCEDURE — 99999 PR PBB SHADOW E&M-EST. PATIENT-LVL IV: ICD-10-PCS | Mod: PBBFAC,,, | Performed by: NURSE PRACTITIONER

## 2021-01-29 PROCEDURE — 71046 XR CHEST PA AND LATERAL: ICD-10-PCS | Mod: 26,,, | Performed by: RADIOLOGY

## 2021-01-29 PROCEDURE — 96402 CHEMO HORMON ANTINEOPL SQ/IM: CPT | Mod: PN

## 2021-01-29 PROCEDURE — 71046 X-RAY EXAM CHEST 2 VIEWS: CPT | Mod: 26,,, | Performed by: RADIOLOGY

## 2021-01-29 RX ADMIN — LEUPROLIDE ACETATE 45 MG: KIT at 03:01

## 2021-07-06 ENCOUNTER — PATIENT MESSAGE (OUTPATIENT)
Dept: HEMATOLOGY/ONCOLOGY | Facility: CLINIC | Age: 31
End: 2021-07-06

## 2021-07-23 ENCOUNTER — OFFICE VISIT (OUTPATIENT)
Dept: HEMATOLOGY/ONCOLOGY | Facility: CLINIC | Age: 31
End: 2021-07-23
Payer: OTHER GOVERNMENT

## 2021-07-23 ENCOUNTER — INFUSION (OUTPATIENT)
Dept: INFUSION THERAPY | Facility: HOSPITAL | Age: 31
End: 2021-07-23
Attending: INTERNAL MEDICINE
Payer: OTHER GOVERNMENT

## 2021-07-23 ENCOUNTER — LAB VISIT (OUTPATIENT)
Dept: LAB | Facility: HOSPITAL | Age: 31
End: 2021-07-23
Attending: NURSE PRACTITIONER
Payer: OTHER GOVERNMENT

## 2021-07-23 VITALS
RESPIRATION RATE: 18 BRPM | HEART RATE: 108 BPM | DIASTOLIC BLOOD PRESSURE: 82 MMHG | HEIGHT: 65 IN | SYSTOLIC BLOOD PRESSURE: 104 MMHG | OXYGEN SATURATION: 97 % | DIASTOLIC BLOOD PRESSURE: 82 MMHG | HEIGHT: 65 IN | HEART RATE: 108 BPM | BODY MASS INDEX: 30.49 KG/M2 | OXYGEN SATURATION: 97 % | WEIGHT: 183 LBS | SYSTOLIC BLOOD PRESSURE: 104 MMHG | RESPIRATION RATE: 18 BRPM | BODY MASS INDEX: 30.49 KG/M2 | TEMPERATURE: 98 F | WEIGHT: 183 LBS | TEMPERATURE: 98 F

## 2021-07-23 DIAGNOSIS — C50.012 MALIGNANT NEOPLASM OF NIPPLE OF LEFT BREAST IN FEMALE, ESTROGEN RECEPTOR POSITIVE: ICD-10-CM

## 2021-07-23 DIAGNOSIS — T45.1X5A HOT FLASHES DUE TO TAMOXIFEN: ICD-10-CM

## 2021-07-23 DIAGNOSIS — Z17.0 MALIGNANT NEOPLASM OF UPPER-INNER QUADRANT OF LEFT BREAST IN FEMALE, ESTROGEN RECEPTOR POSITIVE: Primary | ICD-10-CM

## 2021-07-23 DIAGNOSIS — Z17.0 MALIGNANT NEOPLASM OF NIPPLE OF LEFT BREAST IN FEMALE, ESTROGEN RECEPTOR POSITIVE: Primary | ICD-10-CM

## 2021-07-23 DIAGNOSIS — R23.2 HOT FLASHES DUE TO TAMOXIFEN: ICD-10-CM

## 2021-07-23 DIAGNOSIS — N92.6 MENSES, IRREGULAR: ICD-10-CM

## 2021-07-23 DIAGNOSIS — Z17.0 MALIGNANT NEOPLASM OF NIPPLE OF LEFT BREAST IN FEMALE, ESTROGEN RECEPTOR POSITIVE: ICD-10-CM

## 2021-07-23 DIAGNOSIS — C50.212 MALIGNANT NEOPLASM OF UPPER-INNER QUADRANT OF LEFT BREAST IN FEMALE, ESTROGEN RECEPTOR POSITIVE: Primary | ICD-10-CM

## 2021-07-23 DIAGNOSIS — C50.012 MALIGNANT NEOPLASM OF NIPPLE OF LEFT BREAST IN FEMALE, ESTROGEN RECEPTOR POSITIVE: Primary | ICD-10-CM

## 2021-07-23 LAB
ALBUMIN SERPL BCP-MCNC: 3.9 G/DL (ref 3.5–5.2)
ALP SERPL-CCNC: 85 U/L (ref 55–135)
ALT SERPL W/O P-5'-P-CCNC: 11 U/L (ref 10–44)
ANION GAP SERPL CALC-SCNC: 13 MMOL/L (ref 8–16)
AST SERPL-CCNC: 18 U/L (ref 10–40)
BASOPHILS # BLD AUTO: 0.05 K/UL (ref 0–0.2)
BASOPHILS NFR BLD: 0.6 % (ref 0–1.9)
BILIRUB SERPL-MCNC: 0.3 MG/DL (ref 0.1–1)
BUN SERPL-MCNC: 11 MG/DL (ref 6–20)
CALCIUM SERPL-MCNC: 9.5 MG/DL (ref 8.7–10.5)
CHLORIDE SERPL-SCNC: 104 MMOL/L (ref 95–110)
CO2 SERPL-SCNC: 24 MMOL/L (ref 23–29)
CREAT SERPL-MCNC: 0.9 MG/DL (ref 0.5–1.4)
DIFFERENTIAL METHOD: ABNORMAL
EOSINOPHIL # BLD AUTO: 0.1 K/UL (ref 0–0.5)
EOSINOPHIL NFR BLD: 0.9 % (ref 0–8)
ERYTHROCYTE [DISTWIDTH] IN BLOOD BY AUTOMATED COUNT: 12.6 % (ref 11.5–14.5)
EST. GFR  (AFRICAN AMERICAN): >60 ML/MIN/1.73 M^2
EST. GFR  (NON AFRICAN AMERICAN): >60 ML/MIN/1.73 M^2
GLUCOSE SERPL-MCNC: 106 MG/DL (ref 70–110)
HCT VFR BLD AUTO: 38.2 % (ref 37–48.5)
HGB BLD-MCNC: 12.9 G/DL (ref 12–16)
IMM GRANULOCYTES # BLD AUTO: 0.03 K/UL (ref 0–0.04)
IMM GRANULOCYTES NFR BLD AUTO: 0.3 % (ref 0–0.5)
LDH SERPL L TO P-CCNC: 155 U/L (ref 110–260)
LYMPHOCYTES # BLD AUTO: 2.3 K/UL (ref 1–4.8)
LYMPHOCYTES NFR BLD: 26.9 % (ref 18–48)
MCH RBC QN AUTO: 29.8 PG (ref 27–31)
MCHC RBC AUTO-ENTMCNC: 33.8 G/DL (ref 32–36)
MCV RBC AUTO: 88 FL (ref 82–98)
MONOCYTES # BLD AUTO: 0.7 K/UL (ref 0.3–1)
MONOCYTES NFR BLD: 8.3 % (ref 4–15)
NEUTROPHILS # BLD AUTO: 5.5 K/UL (ref 1.8–7.7)
NEUTROPHILS NFR BLD: 63 % (ref 38–73)
NRBC BLD-RTO: 0 /100 WBC
PLATELET # BLD AUTO: 230 K/UL (ref 150–450)
PMV BLD AUTO: 8.8 FL (ref 9.2–12.9)
POTASSIUM SERPL-SCNC: 4.3 MMOL/L (ref 3.5–5.1)
PROT SERPL-MCNC: 7.7 G/DL (ref 6–8.4)
RBC # BLD AUTO: 4.33 M/UL (ref 4–5.4)
SODIUM SERPL-SCNC: 141 MMOL/L (ref 136–145)
WBC # BLD AUTO: 8.67 K/UL (ref 3.9–12.7)

## 2021-07-23 PROCEDURE — 99999 PR PBB SHADOW E&M-EST. PATIENT-LVL IV: CPT | Mod: PBBFAC,,, | Performed by: NURSE PRACTITIONER

## 2021-07-23 PROCEDURE — 99214 PR OFFICE/OUTPT VISIT, EST, LEVL IV, 30-39 MIN: ICD-10-PCS | Mod: S$PBB,,, | Performed by: NURSE PRACTITIONER

## 2021-07-23 PROCEDURE — 36415 COLL VENOUS BLD VENIPUNCTURE: CPT | Mod: PN | Performed by: NURSE PRACTITIONER

## 2021-07-23 PROCEDURE — 83615 LACTATE (LD) (LDH) ENZYME: CPT | Mod: PN | Performed by: NURSE PRACTITIONER

## 2021-07-23 PROCEDURE — 99214 OFFICE O/P EST MOD 30 MIN: CPT | Mod: PBBFAC,PN | Performed by: NURSE PRACTITIONER

## 2021-07-23 PROCEDURE — 80053 COMPREHEN METABOLIC PANEL: CPT | Mod: PN | Performed by: NURSE PRACTITIONER

## 2021-07-23 PROCEDURE — 96402 CHEMO HORMON ANTINEOPL SQ/IM: CPT | Mod: PN

## 2021-07-23 PROCEDURE — 99214 OFFICE O/P EST MOD 30 MIN: CPT | Mod: S$PBB,,, | Performed by: NURSE PRACTITIONER

## 2021-07-23 PROCEDURE — 82306 VITAMIN D 25 HYDROXY: CPT | Performed by: NURSE PRACTITIONER

## 2021-07-23 PROCEDURE — 85025 COMPLETE CBC W/AUTO DIFF WBC: CPT | Mod: PN | Performed by: NURSE PRACTITIONER

## 2021-07-23 PROCEDURE — 63600175 PHARM REV CODE 636 W HCPCS: Mod: JG,PN | Performed by: NURSE PRACTITIONER

## 2021-07-23 PROCEDURE — 99999 PR PBB SHADOW E&M-EST. PATIENT-LVL IV: ICD-10-PCS | Mod: PBBFAC,,, | Performed by: NURSE PRACTITIONER

## 2021-07-23 RX ORDER — AZITHROMYCIN 250 MG/1
250 TABLET, FILM COATED ORAL
COMMUNITY
Start: 2021-05-20 | End: 2022-07-20 | Stop reason: ALTCHOICE

## 2021-07-23 RX ORDER — PREDNISONE 20 MG/1
60 TABLET ORAL DAILY
COMMUNITY
Start: 2021-05-20 | End: 2022-07-20

## 2021-07-23 RX ORDER — COVID-19 MOLECULAR TEST ASSAY
KIT MISCELLANEOUS
COMMUNITY
Start: 2021-06-12 | End: 2022-07-20 | Stop reason: ALTCHOICE

## 2021-07-23 RX ADMIN — LEUPROLIDE ACETATE 45 MG: KIT at 02:07

## 2021-07-24 LAB — 25(OH)D3+25(OH)D2 SERPL-MCNC: 24 NG/ML (ref 30–96)

## 2021-07-26 ENCOUNTER — PATIENT MESSAGE (OUTPATIENT)
Dept: HEMATOLOGY/ONCOLOGY | Facility: CLINIC | Age: 31
End: 2021-07-26

## 2021-07-27 ENCOUNTER — PATIENT MESSAGE (OUTPATIENT)
Dept: HEMATOLOGY/ONCOLOGY | Facility: CLINIC | Age: 31
End: 2021-07-27

## 2021-08-30 ENCOUNTER — PATIENT MESSAGE (OUTPATIENT)
Dept: INFUSION THERAPY | Facility: HOSPITAL | Age: 31
End: 2021-08-30

## 2022-01-03 ENCOUNTER — PATIENT MESSAGE (OUTPATIENT)
Dept: HEMATOLOGY/ONCOLOGY | Facility: CLINIC | Age: 32
End: 2022-01-03
Payer: OTHER GOVERNMENT

## 2022-01-18 ENCOUNTER — PATIENT MESSAGE (OUTPATIENT)
Dept: HEMATOLOGY/ONCOLOGY | Facility: CLINIC | Age: 32
End: 2022-01-18
Payer: OTHER GOVERNMENT

## 2022-01-24 ENCOUNTER — INFUSION (OUTPATIENT)
Dept: INFUSION THERAPY | Facility: HOSPITAL | Age: 32
End: 2022-01-24
Attending: NURSE PRACTITIONER
Payer: OTHER GOVERNMENT

## 2022-01-24 ENCOUNTER — PATIENT MESSAGE (OUTPATIENT)
Dept: HEMATOLOGY/ONCOLOGY | Facility: CLINIC | Age: 32
End: 2022-01-24
Payer: OTHER GOVERNMENT

## 2022-01-24 ENCOUNTER — HOSPITAL ENCOUNTER (OUTPATIENT)
Dept: RADIOLOGY | Facility: HOSPITAL | Age: 32
Discharge: HOME | End: 2022-01-24
Attending: NURSE PRACTITIONER
Payer: OTHER GOVERNMENT

## 2022-01-24 ENCOUNTER — OFFICE VISIT (OUTPATIENT)
Dept: HEMATOLOGY/ONCOLOGY | Facility: CLINIC | Age: 32
End: 2022-01-24
Payer: OTHER GOVERNMENT

## 2022-01-24 ENCOUNTER — LAB VISIT (OUTPATIENT)
Dept: LAB | Facility: HOSPITAL | Age: 32
End: 2022-01-24
Attending: NURSE PRACTITIONER
Payer: OTHER GOVERNMENT

## 2022-01-24 VITALS
HEART RATE: 97 BPM | BODY MASS INDEX: 32.29 KG/M2 | DIASTOLIC BLOOD PRESSURE: 83 MMHG | SYSTOLIC BLOOD PRESSURE: 123 MMHG | TEMPERATURE: 97 F | HEIGHT: 65 IN | WEIGHT: 193.81 LBS | RESPIRATION RATE: 17 BRPM

## 2022-01-24 VITALS
HEART RATE: 97 BPM | OXYGEN SATURATION: 98 % | WEIGHT: 193.81 LBS | SYSTOLIC BLOOD PRESSURE: 123 MMHG | DIASTOLIC BLOOD PRESSURE: 83 MMHG | RESPIRATION RATE: 17 BRPM | HEIGHT: 65 IN | BODY MASS INDEX: 32.29 KG/M2 | TEMPERATURE: 96 F

## 2022-01-24 DIAGNOSIS — C50.212 MALIGNANT NEOPLASM OF UPPER-INNER QUADRANT OF LEFT BREAST IN FEMALE, ESTROGEN RECEPTOR POSITIVE: ICD-10-CM

## 2022-01-24 DIAGNOSIS — C77.9 REGIONAL LYMPH NODE METASTASIS PRESENT: ICD-10-CM

## 2022-01-24 DIAGNOSIS — Z17.0 MALIGNANT NEOPLASM OF UPPER-INNER QUADRANT OF LEFT BREAST IN FEMALE, ESTROGEN RECEPTOR POSITIVE: ICD-10-CM

## 2022-01-24 DIAGNOSIS — C50.012 MALIGNANT NEOPLASM OF NIPPLE OF LEFT BREAST IN FEMALE, ESTROGEN RECEPTOR POSITIVE: Primary | ICD-10-CM

## 2022-01-24 DIAGNOSIS — Z17.0 MALIGNANT NEOPLASM OF NIPPLE OF LEFT BREAST IN FEMALE, ESTROGEN RECEPTOR POSITIVE: Primary | ICD-10-CM

## 2022-01-24 DIAGNOSIS — R63.5 WEIGHT GAIN: ICD-10-CM

## 2022-01-24 DIAGNOSIS — R23.2 HOT FLASHES DUE TO TAMOXIFEN: ICD-10-CM

## 2022-01-24 DIAGNOSIS — C50.212 MALIGNANT NEOPLASM OF UPPER-INNER QUADRANT OF LEFT BREAST IN FEMALE, ESTROGEN RECEPTOR POSITIVE: Primary | ICD-10-CM

## 2022-01-24 DIAGNOSIS — T45.1X5A HOT FLASHES DUE TO TAMOXIFEN: ICD-10-CM

## 2022-01-24 DIAGNOSIS — Z17.0 MALIGNANT NEOPLASM OF UPPER-INNER QUADRANT OF LEFT BREAST IN FEMALE, ESTROGEN RECEPTOR POSITIVE: Primary | ICD-10-CM

## 2022-01-24 DIAGNOSIS — R53.83 FATIGUE, UNSPECIFIED TYPE: ICD-10-CM

## 2022-01-24 DIAGNOSIS — N91.1 AMENORRHEA, SECONDARY: ICD-10-CM

## 2022-01-24 LAB
ALBUMIN SERPL BCP-MCNC: 3.8 G/DL (ref 3.5–5.2)
ALP SERPL-CCNC: 86 U/L (ref 55–135)
ALT SERPL W/O P-5'-P-CCNC: 15 U/L (ref 10–44)
ANION GAP SERPL CALC-SCNC: 10 MMOL/L (ref 8–16)
AST SERPL-CCNC: 17 U/L (ref 10–40)
BASOPHILS # BLD AUTO: 0.04 K/UL (ref 0–0.2)
BASOPHILS NFR BLD: 0.7 % (ref 0–1.9)
BILIRUB SERPL-MCNC: 0.2 MG/DL (ref 0.1–1)
BUN SERPL-MCNC: 11 MG/DL (ref 6–20)
CALCIUM SERPL-MCNC: 8.8 MG/DL (ref 8.7–10.5)
CHLORIDE SERPL-SCNC: 107 MMOL/L (ref 95–110)
CO2 SERPL-SCNC: 25 MMOL/L (ref 23–29)
CREAT SERPL-MCNC: 0.8 MG/DL (ref 0.5–1.4)
DIFFERENTIAL METHOD: ABNORMAL
EOSINOPHIL # BLD AUTO: 0.2 K/UL (ref 0–0.5)
EOSINOPHIL NFR BLD: 2.5 % (ref 0–8)
ERYTHROCYTE [DISTWIDTH] IN BLOOD BY AUTOMATED COUNT: 12.5 % (ref 11.5–14.5)
EST. GFR  (AFRICAN AMERICAN): >60 ML/MIN/1.73 M^2
EST. GFR  (NON AFRICAN AMERICAN): >60 ML/MIN/1.73 M^2
GLUCOSE SERPL-MCNC: 110 MG/DL (ref 70–110)
HCT VFR BLD AUTO: 37.3 % (ref 37–48.5)
HGB BLD-MCNC: 12.4 G/DL (ref 12–16)
IMM GRANULOCYTES # BLD AUTO: 0.01 K/UL (ref 0–0.04)
IMM GRANULOCYTES NFR BLD AUTO: 0.2 % (ref 0–0.5)
LDH SERPL L TO P-CCNC: 165 U/L (ref 110–260)
LYMPHOCYTES # BLD AUTO: 2.2 K/UL (ref 1–4.8)
LYMPHOCYTES NFR BLD: 37 % (ref 18–48)
MCH RBC QN AUTO: 29.6 PG (ref 27–31)
MCHC RBC AUTO-ENTMCNC: 33.2 G/DL (ref 32–36)
MCV RBC AUTO: 89 FL (ref 82–98)
MONOCYTES # BLD AUTO: 0.6 K/UL (ref 0.3–1)
MONOCYTES NFR BLD: 9.1 % (ref 4–15)
NEUTROPHILS # BLD AUTO: 3.1 K/UL (ref 1.8–7.7)
NEUTROPHILS NFR BLD: 50.5 % (ref 38–73)
NRBC BLD-RTO: 0 /100 WBC
PLATELET # BLD AUTO: 217 K/UL (ref 150–450)
PMV BLD AUTO: 9 FL (ref 9.2–12.9)
POTASSIUM SERPL-SCNC: 4.2 MMOL/L (ref 3.5–5.1)
PROT SERPL-MCNC: 7.4 G/DL (ref 6–8.4)
RBC # BLD AUTO: 4.19 M/UL (ref 4–5.4)
SODIUM SERPL-SCNC: 142 MMOL/L (ref 136–145)
WBC # BLD AUTO: 6.06 K/UL (ref 3.9–12.7)

## 2022-01-24 PROCEDURE — 63600175 PHARM REV CODE 636 W HCPCS: Mod: JG,PN | Performed by: NURSE PRACTITIONER

## 2022-01-24 PROCEDURE — 80053 COMPREHEN METABOLIC PANEL: CPT | Mod: PO | Performed by: NURSE PRACTITIONER

## 2022-01-24 PROCEDURE — 96402 CHEMO HORMON ANTINEOPL SQ/IM: CPT | Mod: PN

## 2022-01-24 PROCEDURE — 83615 LACTATE (LD) (LDH) ENZYME: CPT | Mod: PO | Performed by: NURSE PRACTITIONER

## 2022-01-24 PROCEDURE — 85025 COMPLETE CBC W/AUTO DIFF WBC: CPT | Mod: PO | Performed by: NURSE PRACTITIONER

## 2022-01-24 PROCEDURE — 99214 OFFICE O/P EST MOD 30 MIN: CPT | Mod: PBBFAC,25,PN | Performed by: INTERNAL MEDICINE

## 2022-01-24 PROCEDURE — 99999 PR PBB SHADOW E&M-EST. PATIENT-LVL IV: ICD-10-PCS | Mod: PBBFAC,,, | Performed by: INTERNAL MEDICINE

## 2022-01-24 PROCEDURE — 99999 PR PBB SHADOW E&M-EST. PATIENT-LVL IV: CPT | Mod: PBBFAC,,, | Performed by: INTERNAL MEDICINE

## 2022-01-24 PROCEDURE — 99214 OFFICE O/P EST MOD 30 MIN: CPT | Mod: S$PBB,,, | Performed by: INTERNAL MEDICINE

## 2022-01-24 PROCEDURE — 71046 X-RAY EXAM CHEST 2 VIEWS: CPT | Mod: 26,,, | Performed by: RADIOLOGY

## 2022-01-24 PROCEDURE — 36415 COLL VENOUS BLD VENIPUNCTURE: CPT | Mod: PO | Performed by: NURSE PRACTITIONER

## 2022-01-24 PROCEDURE — 71046 XR CHEST PA AND LATERAL: ICD-10-PCS | Mod: 26,,, | Performed by: RADIOLOGY

## 2022-01-24 PROCEDURE — 71046 X-RAY EXAM CHEST 2 VIEWS: CPT | Mod: TC,FY,PO

## 2022-01-24 PROCEDURE — 99214 PR OFFICE/OUTPT VISIT, EST, LEVL IV, 30-39 MIN: ICD-10-PCS | Mod: S$PBB,,, | Performed by: INTERNAL MEDICINE

## 2022-01-24 RX ADMIN — LEUPROLIDE ACETATE 45 MG: KIT at 03:01

## 2022-01-24 NOTE — PLAN OF CARE
Problem: Fatigue  Goal: Improved Activity Tolerance  Intervention: Promote Improved Energy  Flowsheets (Taken 1/24/2022 1513)  Fatigue Management:   activity schedule adjusted   frequent rest breaks encouraged   paced activity encouraged   fatigue-related activity identified  Sleep/Rest Enhancement:   regular sleep/rest pattern promoted   relaxation techniques promoted  Activity Management:   Ambulated -L4   Ambulated in briceno - L4     Problem: Adult Inpatient Plan of Care  Goal: Patient-Specific Goal (Individualized)  Outcome: Ongoing, Progressing  Flowsheets (Taken 1/24/2022 1513)  Anxieties, Fears or Concerns: None  Individualized Care Needs: None  Patient-Specific Goals (Include Timeframe): Infection prevention during treatment.     Problem: Adult Inpatient Plan of Care  Goal: Plan of Care Review  Outcome: Ongoing, Progressing  Flowsheets (Taken 1/24/2022 1513)  Plan of Care Reviewed With: patient  Tolerated injection well.  Ambulated from infusion center with steady gait.

## 2022-01-24 NOTE — PROGRESS NOTES
History of present illness:  The patient is a 31-year-old white female well known to me for locally advanced left breast carcinoma which was ER positive, [place] positive, HER2 Shantel negative and BRCA1/2-.  Patient received neoadjuvant therapy consisting of 4 cycles of Adriamycin/Cytoxan followed by 4 cycles of adjuvant Taxotere.  She underwent bilateral skin sparing mastectomies and implant reconstruction.  The patient had residual node positive disease at the time of resection.  She completed postoperative adjuvant XRT followed by 6 month course of adjuvant Xeloda.  She remains on adjuvant tamoxifen 20 mg daily.  Menstrual periods, had been irregular, but persistent following chemotherapy and necesitated institution of depot Lupron.  Patient experiences hot flashes on this medication.  They are stable and average approximately 2 per day. Patient is 42 months from completion of adjuvant Xeloda and returns for surveillance.      Physical examination:  The patient is a well-developed, well-nourished, white female, in no acute distress, who has a weight of 193.5 lb (increased by 25.5 lb).  VITAL SIGNS: Documented  and reviewed this visit.  HEENT: Normocephalic, atraumatic. Oral mucosa pink and moist. Lips without lesions. Tongue midline. Oropharynx clear. Nonicteric sclerae.   NECK: Supple, no adenopathy. No carotid bruits, thyromegaly or thyroid nodule.   HEART: Regular rate and rhythm without murmur, gallop or rub.   LUNGS: Clear to auscultation bilaterally. Normal respiratory effort.   ABDOMEN: Soft, nontender, nondistended with positive normoactive bowel sounds, no hepatosplenomegaly.   EXTREMITIES: No cyanosis, clubbing or edema. Distal pulses are intact.   AXILLAE AND GROIN: No palpable pathologic lymphadenopathy is appreciated.   SKIN: Intact/turgor normal   NEUROLOGIC: Cranial nerves II-XII grossly intact. Motor: Good muscle bulk and tone. Strength/sensory 5/5 throughout. Gait stable.   BREASTS:  Both surgically  reconstructed.  There are no signs of local recurrence.    Laboratory:  White count 6.1, hemoglobin 12.4, hematocrit 37.3, platelets 217, absolute neutrophil count is 3100.  Sodium 142, potassium 4.2, chloride 107, CO2 25, BUN 11, creatinine 0.8, glucose 110, calcium 8.8, liver function test within normal limits, LDH is 165, GFR is greater than 60.     CXR:  Negative chest.  No significant change.    Impression:  1.  Locally advanced left breast carcinoma without evidence of disease recurrence.  2.  Amenorrhea on depot Lupron  3.  Hot flashes secondary to tamoxifen-mild/stable/remains on venlafaxine.  4.  Weight gain/fatigue.    Plan:  1.  Continue tamoxifen 20 mg daily.  2.  Repeat Lupron 45 mg IM.  3.  Return to clinic in 6 months with interval CBC, CMP, and LDH for 48 month post therapy re-evaluation and next dose of Lupron.    This note was created using voice recognition software and may contain grammatical errors.

## 2022-02-15 DIAGNOSIS — C50.011 MALIGNANT NEOPLASM OF NIPPLE OF RIGHT BREAST IN FEMALE, UNSPECIFIED ESTROGEN RECEPTOR STATUS: ICD-10-CM

## 2022-02-15 RX ORDER — VENLAFAXINE 37.5 MG/1
37.5 TABLET ORAL DAILY
Qty: 90 TABLET | Refills: 3 | Status: SHIPPED | OUTPATIENT
Start: 2022-02-15

## 2022-06-29 ENCOUNTER — TELEPHONE (OUTPATIENT)
Dept: HEMATOLOGY/ONCOLOGY | Facility: CLINIC | Age: 32
End: 2022-06-29
Payer: OTHER GOVERNMENT

## 2022-06-29 ENCOUNTER — PATIENT MESSAGE (OUTPATIENT)
Dept: HEMATOLOGY/ONCOLOGY | Facility: CLINIC | Age: 32
End: 2022-06-29
Payer: OTHER GOVERNMENT

## 2022-07-20 ENCOUNTER — LAB VISIT (OUTPATIENT)
Dept: LAB | Facility: HOSPITAL | Age: 32
End: 2022-07-20
Attending: INTERNAL MEDICINE
Payer: OTHER GOVERNMENT

## 2022-07-20 ENCOUNTER — OFFICE VISIT (OUTPATIENT)
Dept: HEMATOLOGY/ONCOLOGY | Facility: CLINIC | Age: 32
End: 2022-07-20
Payer: OTHER GOVERNMENT

## 2022-07-20 ENCOUNTER — INFUSION (OUTPATIENT)
Dept: INFUSION THERAPY | Facility: HOSPITAL | Age: 32
End: 2022-07-20
Attending: INTERNAL MEDICINE
Payer: OTHER GOVERNMENT

## 2022-07-20 VITALS
HEIGHT: 65 IN | SYSTOLIC BLOOD PRESSURE: 100 MMHG | HEART RATE: 124 BPM | DIASTOLIC BLOOD PRESSURE: 70 MMHG | BODY MASS INDEX: 32.14 KG/M2 | WEIGHT: 192.88 LBS | TEMPERATURE: 97 F | OXYGEN SATURATION: 96 %

## 2022-07-20 VITALS
TEMPERATURE: 98 F | HEART RATE: 104 BPM | HEIGHT: 65 IN | DIASTOLIC BLOOD PRESSURE: 70 MMHG | BODY MASS INDEX: 32.14 KG/M2 | WEIGHT: 192.88 LBS | SYSTOLIC BLOOD PRESSURE: 100 MMHG

## 2022-07-20 DIAGNOSIS — C77.9 REGIONAL LYMPH NODE METASTASIS PRESENT: ICD-10-CM

## 2022-07-20 DIAGNOSIS — N91.1 AMENORRHEA, SECONDARY: ICD-10-CM

## 2022-07-20 DIAGNOSIS — T45.1X5A HOT FLASHES DUE TO TAMOXIFEN: ICD-10-CM

## 2022-07-20 DIAGNOSIS — C50.012 MALIGNANT NEOPLASM OF NIPPLE OF LEFT BREAST IN FEMALE, ESTROGEN RECEPTOR POSITIVE: Primary | ICD-10-CM

## 2022-07-20 DIAGNOSIS — C50.212 MALIGNANT NEOPLASM OF UPPER-INNER QUADRANT OF LEFT BREAST IN FEMALE, ESTROGEN RECEPTOR POSITIVE: ICD-10-CM

## 2022-07-20 DIAGNOSIS — R23.2 HOT FLASHES DUE TO TAMOXIFEN: ICD-10-CM

## 2022-07-20 DIAGNOSIS — Z17.0 MALIGNANT NEOPLASM OF UPPER-INNER QUADRANT OF LEFT BREAST IN FEMALE, ESTROGEN RECEPTOR POSITIVE: ICD-10-CM

## 2022-07-20 DIAGNOSIS — C50.212 MALIGNANT NEOPLASM OF UPPER-INNER QUADRANT OF LEFT BREAST IN FEMALE, ESTROGEN RECEPTOR POSITIVE: Primary | ICD-10-CM

## 2022-07-20 DIAGNOSIS — Z17.0 MALIGNANT NEOPLASM OF NIPPLE OF LEFT BREAST IN FEMALE, ESTROGEN RECEPTOR POSITIVE: Primary | ICD-10-CM

## 2022-07-20 DIAGNOSIS — Z17.0 MALIGNANT NEOPLASM OF UPPER-INNER QUADRANT OF LEFT BREAST IN FEMALE, ESTROGEN RECEPTOR POSITIVE: Primary | ICD-10-CM

## 2022-07-20 LAB
ALBUMIN SERPL BCP-MCNC: 4.1 G/DL (ref 3.5–5.2)
ALP SERPL-CCNC: 88 U/L (ref 55–135)
ALT SERPL W/O P-5'-P-CCNC: 14 U/L (ref 10–44)
ANION GAP SERPL CALC-SCNC: 12 MMOL/L (ref 8–16)
AST SERPL-CCNC: 22 U/L (ref 10–40)
BASOPHILS # BLD AUTO: 0.05 K/UL (ref 0–0.2)
BASOPHILS NFR BLD: 0.5 % (ref 0–1.9)
BILIRUB SERPL-MCNC: 0.4 MG/DL (ref 0.1–1)
BUN SERPL-MCNC: 10 MG/DL (ref 6–20)
CALCIUM SERPL-MCNC: 9.5 MG/DL (ref 8.7–10.5)
CHLORIDE SERPL-SCNC: 106 MMOL/L (ref 95–110)
CO2 SERPL-SCNC: 23 MMOL/L (ref 23–29)
CREAT SERPL-MCNC: 1.1 MG/DL (ref 0.5–1.4)
DIFFERENTIAL METHOD: ABNORMAL
EOSINOPHIL # BLD AUTO: 0.1 K/UL (ref 0–0.5)
EOSINOPHIL NFR BLD: 1.2 % (ref 0–8)
ERYTHROCYTE [DISTWIDTH] IN BLOOD BY AUTOMATED COUNT: 12.4 % (ref 11.5–14.5)
EST. GFR  (AFRICAN AMERICAN): >60 ML/MIN/1.73 M^2
EST. GFR  (NON AFRICAN AMERICAN): >60 ML/MIN/1.73 M^2
GLUCOSE SERPL-MCNC: 115 MG/DL (ref 70–110)
HCT VFR BLD AUTO: 38.3 % (ref 37–48.5)
HGB BLD-MCNC: 13 G/DL (ref 12–16)
IMM GRANULOCYTES # BLD AUTO: 0.03 K/UL (ref 0–0.04)
IMM GRANULOCYTES NFR BLD AUTO: 0.3 % (ref 0–0.5)
LDH SERPL L TO P-CCNC: 208 U/L (ref 110–260)
LYMPHOCYTES # BLD AUTO: 3.2 K/UL (ref 1–4.8)
LYMPHOCYTES NFR BLD: 31.8 % (ref 18–48)
MCH RBC QN AUTO: 30.3 PG (ref 27–31)
MCHC RBC AUTO-ENTMCNC: 33.9 G/DL (ref 32–36)
MCV RBC AUTO: 89 FL (ref 82–98)
MONOCYTES # BLD AUTO: 0.7 K/UL (ref 0.3–1)
MONOCYTES NFR BLD: 7.1 % (ref 4–15)
NEUTROPHILS # BLD AUTO: 5.9 K/UL (ref 1.8–7.7)
NEUTROPHILS NFR BLD: 59.1 % (ref 38–73)
NRBC BLD-RTO: 0 /100 WBC
PLATELET # BLD AUTO: 264 K/UL (ref 150–450)
PMV BLD AUTO: 9.1 FL (ref 9.2–12.9)
POTASSIUM SERPL-SCNC: 4.3 MMOL/L (ref 3.5–5.1)
PROT SERPL-MCNC: 8 G/DL (ref 6–8.4)
RBC # BLD AUTO: 4.29 M/UL (ref 4–5.4)
SODIUM SERPL-SCNC: 141 MMOL/L (ref 136–145)
WBC # BLD AUTO: 9.97 K/UL (ref 3.9–12.7)

## 2022-07-20 PROCEDURE — 99999 PR PBB SHADOW E&M-EST. PATIENT-LVL III: ICD-10-PCS | Mod: PBBFAC,,,

## 2022-07-20 PROCEDURE — 99999 PR PBB SHADOW E&M-EST. PATIENT-LVL III: CPT | Mod: PBBFAC,,,

## 2022-07-20 PROCEDURE — 99213 OFFICE O/P EST LOW 20 MIN: CPT | Mod: PBBFAC,PN

## 2022-07-20 PROCEDURE — 85025 COMPLETE CBC W/AUTO DIFF WBC: CPT | Mod: PN | Performed by: INTERNAL MEDICINE

## 2022-07-20 PROCEDURE — 96372 THER/PROPH/DIAG INJ SC/IM: CPT | Mod: PN

## 2022-07-20 PROCEDURE — 63600175 PHARM REV CODE 636 W HCPCS: Mod: JG,PN | Performed by: INTERNAL MEDICINE

## 2022-07-20 PROCEDURE — 83615 LACTATE (LD) (LDH) ENZYME: CPT | Mod: PN | Performed by: INTERNAL MEDICINE

## 2022-07-20 PROCEDURE — 99215 OFFICE O/P EST HI 40 MIN: CPT | Mod: S$PBB,,,

## 2022-07-20 PROCEDURE — 36415 COLL VENOUS BLD VENIPUNCTURE: CPT | Mod: PN | Performed by: INTERNAL MEDICINE

## 2022-07-20 PROCEDURE — 99215 PR OFFICE/OUTPT VISIT, EST, LEVL V, 40-54 MIN: ICD-10-PCS | Mod: S$PBB,,,

## 2022-07-20 PROCEDURE — 80053 COMPREHEN METABOLIC PANEL: CPT | Mod: PN | Performed by: INTERNAL MEDICINE

## 2022-07-20 RX ADMIN — LEUPROLIDE ACETATE 45 MG: KIT at 02:07

## 2022-07-20 NOTE — PROGRESS NOTES
PATIENT: Berto Chun  MRN: [id number]  DATE: 7/20/2022      Diagnosis:   1. Malignant neoplasm of upper-inner quadrant of left breast in female, estrogen receptor positive    2. Regional lymph node metastasis present    3. Hot flashes due to tamoxifen    4. Amenorrhea, secondary        Chief Complaint: Breast Cancer (6 month follow up)    Subjective:   HPI: Ms. Chun is a 31 y.o. female who is established with Dr. Gaming for diagnosis of locally advanced left breast carcinoma which was ER positive, MO positive, HER2 Shantel negative and BRCA1/2 negative. She is seen today 48 months from completion of adjuvant Xeloda, currently taking Tamoxifen 20 mg daily and receiving Lupron 45 mg IM every 6 months.   Hot flashes are managed with use of Venlafaxine. She is not compliant with Calcium and Vitamin D supplementation, only taking this periodically.   She presented to the clinic today with elevated HR at 124. Upon recheck it was 104. She tells me she came directly from the park where she was running with her son and has not hydrated well today. No palpitation, chest pain, dizziness, feeling light headed.   She has moved to a new area and has yet to establish with a new GYN, plans to do this soon. She has questions about possible hysterectomy in the future.   Denies HA, CP, cough, SOB, abd pain, changes in bowel habits, N/V, dysuria, swelling, new lumps or bumps. No fevers, chills, night sweats.     Prior History:    Patient received neoadjuvant therapy consisting of 4 cycles of Adriamycin/Cytoxan followed by 4 cycles of adjuvant Taxotere.  She underwent bilateral skin sparing mastectomies and implant reconstruction.  The patient had residual node positive disease at the time of resection.    She completed postoperative adjuvant XRT followed by 6 month course of adjuvant Xeloda.  She remains on adjuvant tamoxifen 20 mg daily.   Menstrual periods, had been irregular, but persistent following chemotherapy and necesitated  institution of depot Lupron.        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    Thyroid goiter        Past Surgical HIstory:   Past Surgical History:   Procedure Laterality Date    BREAST BIOPSY Left 04/2017    CERVICAL BIOPSY  W/ LOOP ELECTRODE EXCISION  8/14, 2/15    x2    COLPOSCOPY  9/2014, 2/2015    mild dysplasia    left breast biopsy      MASTECTOMY Bilateral 11/2017    PORTACATH PLACEMENT      right upper chest    WISDOM TOOTH EXTRACTION         Family History:   Family History   Problem Relation Age of Onset    Lung cancer Paternal Grandfather     Leukemia Paternal Grandmother         ALL    Lung cancer Maternal Grandmother     Pancreatic cancer Maternal Grandfather     Hypertension Father     Hyperlipidemia Mother     Cancer Maternal Aunt     Colon cancer Maternal Aunt     Cancer Paternal Aunt     Colon cancer Paternal Aunt     Breast cancer Neg Hx     Ovarian cancer Neg Hx        Social History:  reports that she has never smoked. She has never used smokeless tobacco. She reports that she does not drink alcohol and does not use drugs.    Allergies:  Review of patient's allergies indicates:  No Known Allergies    Medications:  Current Outpatient Medications   Medication Sig Dispense Refill    tamoxifen (NOLVADEX) 20 MG Tab TAKE 1 TABLET DAILY 90 tablet 3    venlafaxine (EFFEXOR) 37.5 MG Tab Take 1 tablet (37.5 mg total) by mouth once daily. 90 tablet 3     No current facility-administered medications for this visit.       Review of Systems   Constitutional: Negative for activity change, appetite change, chills, fatigue, fever and unexpected weight change.   HENT: Negative for trouble swallowing.    Eyes: Negative for visual disturbance.   Respiratory: Negative  "for cough and shortness of breath.    Cardiovascular: Negative for chest pain, palpitations and leg swelling.   Gastrointestinal: Negative for abdominal pain, constipation, diarrhea, nausea and vomiting.   Genitourinary: Negative for dysuria and hematuria.   Musculoskeletal: Negative for arthralgias and myalgias.   Skin: Negative for rash.   Neurological: Negative for light-headedness and headaches.   Hematological: Negative for adenopathy. Does not bruise/bleed easily.   Psychiatric/Behavioral: The patient is not nervous/anxious.        ECOG Performance Status:   ECOG SCORE           Objective:      Vitals:   Vitals:    07/20/22 1325   BP: 100/70   BP Location: Right arm   Patient Position: Sitting   BP Method: Medium (Manual)   Pulse: (!) 124   Temp: 96.6 °F (35.9 °C)   TempSrc: Temporal   SpO2: 96%   Weight: 87.5 kg (192 lb 14.4 oz)   Height: 5' 5" (1.651 m)     BMI: Body mass index is 32.1 kg/m².    Physical Exam  Vitals reviewed.   Constitutional:       General: She is not in acute distress.     Appearance: She is not diaphoretic.   HENT:      Head: Normocephalic and atraumatic.      Mouth/Throat:      Mouth: Mucous membranes are moist.      Pharynx: No oropharyngeal exudate.   Eyes:      General: No scleral icterus.  Cardiovascular:      Rate and Rhythm: Regular rhythm. Tachycardia present.      Heart sounds: No murmur heard.    No friction rub. No gallop.   Pulmonary:      Effort: Pulmonary effort is normal. No respiratory distress.      Breath sounds: No wheezing, rhonchi or rales.   Chest:   Breasts:      Right: No swelling, mass, skin change, tenderness, axillary adenopathy or supraclavicular adenopathy.      Left: No swelling, mass, skin change, tenderness, axillary adenopathy or supraclavicular adenopathy.        Comments: Bilateral breast, visible scarring from reconstructive surgery   Abdominal:      General: Bowel sounds are normal. There is no distension.      Palpations: Abdomen is soft.      " Tenderness: There is no abdominal tenderness. There is no guarding.   Musculoskeletal:      Cervical back: No tenderness.      Right lower leg: No edema.      Left lower leg: No edema.   Lymphadenopathy:      Cervical: No cervical adenopathy.      Upper Body:      Right upper body: No supraclavicular, axillary or pectoral adenopathy.      Left upper body: No supraclavicular, axillary or pectoral adenopathy.   Skin:     General: Skin is warm and dry.      Coloration: Skin is not jaundiced.      Findings: No bruising or rash.   Neurological:      Mental Status: She is alert and oriented to person, place, and time.      Gait: Gait normal.   Psychiatric:         Mood and Affect: Mood normal.         Behavior: Behavior normal.         Judgment: Judgment normal.         Laboratory Data:  Lab Results   Component Value Date    WBC 9.97 07/20/2022    HGB 13.0 07/20/2022    HCT 38.3 07/20/2022    MCV 89 07/20/2022     07/20/2022        Assessment:       1. Malignant neoplasm of upper-inner quadrant of left breast in female, estrogen receptor positive    2. Regional lymph node metastasis present    3. Hot flashes due to tamoxifen    4. Amenorrhea, secondary         Plan:   Breast Cancer, left   -Locally advanced left breast carcinoma without evidence of disease recurrence  -Continue Tamoxifen 20 mg po daily  -Proceed with Lupron 45 mg IM today and every 6 months   -follow up in 6 months with labs and CXR prior to visit    Hot flashes  -Managed with Venlafaxine   -Monitor     Amenorrhea  -Drug induced while on Lupron   -Monitor     Patient queried and all questions were answered.  Assessment/Plan reviewed and approved by Dr. Gaming       Route Chart for Scheduling    Med Onc Chart Routing      Follow up with physician    Follow up with [facility] 6 months. With labs and CXR prior to visit    Infusion scheduling note    Injection scheduling note Proceed with Lupron today   Labs CBC, CMP and vitamin D   Lab interval:  Prior to  visit in 6 months    Imaging Other   CXR prior to visit in 6 months    Pharmacy appointment    Other referrals            Therapy Plan Information  leuprolide (6 month) injection 45 mg  45 mg, Intramuscular, Every visit

## 2022-07-20 NOTE — PLAN OF CARE
Problem: Fatigue  Goal: Improved Activity Tolerance  Intervention: Promote Improved Energy  Flowsheets (Taken 7/20/2022 1435)  Fatigue Management:   activity schedule adjusted   frequent rest breaks encouraged   paced activity encouraged   fatigue-related activity identified  Sleep/Rest Enhancement:   regular sleep/rest pattern promoted   relaxation techniques promoted  Activity Management:   Ambulated -L4   Ambulated in briceno - L4     Problem: Adult Inpatient Plan of Care  Goal: Patient-Specific Goal (Individualized)  Outcome: Ongoing, Progressing  Flowsheets (Taken 7/20/2022 1435)  Anxieties, Fears or Concerns: None  Individualized Care Needs: None  Patient-Specific Goals (Include Timeframe): Infection prevention during treatment.     Problem: Adult Inpatient Plan of Care  Goal: Plan of Care Review  Outcome: Ongoing, Progressing  Flowsheets (Taken 7/20/2022 1435)  Plan of Care Reviewed With: patient  Tolerated treatment with no known distress.  Ambulated from infusion center with steady gait.

## 2022-08-16 ENCOUNTER — TELEPHONE (OUTPATIENT)
Dept: HEMATOLOGY/ONCOLOGY | Facility: CLINIC | Age: 32
End: 2022-08-16
Payer: OTHER GOVERNMENT

## 2022-08-16 NOTE — TELEPHONE ENCOUNTER
I spoke with Ciara and she said she is very interested in the appt but lives out of state in [place] and will not be able to attend. I suggested making appt in January for the next time she is in clinic and she declined as she would not be here to f/u on the services. I suggested finding something closer to her that could help, like acupuncture.      ----- Message from Summer Montes, Patient Care Assistant sent at 8/16/2022  3:25 PM CDT -----  Regarding: rtn call gatito  Type:  Patient Returning Call    Who Called:  ciara  Who Left Message for Patient:  gatito  Does the patient know what this is regarding?:  schedule appointment   Best Call Back Number:  [phone]      Additional Information:  ciara is returning a call from Gatito to schedule ;appointment   please call to further discuss, thank you

## 2022-08-18 ENCOUNTER — PATIENT MESSAGE (OUTPATIENT)
Dept: HEMATOLOGY/ONCOLOGY | Facility: CLINIC | Age: 32
End: 2022-08-18
Payer: OTHER GOVERNMENT

## 2022-09-01 ENCOUNTER — PATIENT MESSAGE (OUTPATIENT)
Dept: HEMATOLOGY/ONCOLOGY | Facility: CLINIC | Age: 32
End: 2022-09-01
Payer: OTHER GOVERNMENT

## 2022-09-22 ENCOUNTER — APPOINTMENT (OUTPATIENT)
Dept: URBAN - METROPOLITAN AREA CLINIC 265 | Age: 32
Setting detail: DERMATOLOGY
End: 2022-09-23

## 2022-09-22 VITALS — RESPIRATION RATE: 18 BRPM | HEIGHT: 64 IN | WEIGHT: 190 LBS

## 2022-09-22 DIAGNOSIS — L73.2 HIDRADENITIS SUPPURATIVA: ICD-10-CM

## 2022-09-22 PROCEDURE — 99203 OFFICE O/P NEW LOW 30 MIN: CPT

## 2022-09-22 PROCEDURE — OTHER PRESCRIPTION: OTHER

## 2022-09-22 PROCEDURE — OTHER MEDICATION COUNSELING: OTHER

## 2022-09-22 PROCEDURE — OTHER PRESCRIPTION MEDICATION MANAGEMENT: OTHER

## 2022-09-22 PROCEDURE — OTHER MIPS QUALITY: OTHER

## 2022-09-22 PROCEDURE — OTHER COUNSELING: OTHER

## 2022-09-22 RX ORDER — DOXYCYCLINE HYCLATE 100 MG/1
TABLET, COATED ORAL
Qty: 20 | Refills: 3 | Status: ERX | COMMUNITY
Start: 2022-09-22

## 2022-09-22 RX ORDER — CLINDAMYCIN PHOSPHATE AND BENZOYL PEROXIDE 1 %-5 %
KIT TOPICAL
Qty: 50 | Refills: 6 | Status: ERX | COMMUNITY
Start: 2022-09-22

## 2022-09-22 ASSESSMENT — LOCATION SIMPLE DESCRIPTION DERM
LOCATION SIMPLE: LEFT THIGH
LOCATION SIMPLE: RIGHT THIGH

## 2022-09-22 ASSESSMENT — LOCATION DETAILED DESCRIPTION DERM
LOCATION DETAILED: RIGHT ANTERIOR PROXIMAL THIGH
LOCATION DETAILED: LEFT ANTERIOR PROXIMAL THIGH

## 2022-09-22 ASSESSMENT — LOCATION ZONE DERM: LOCATION ZONE: LEG

## 2022-09-22 ASSESSMENT — HURLEY STAGE
IN YOUR EXPERIENCE, AMONG ALL PATIENTS YOU HAVE SEEN WITH THIS CONDITION, HOW SEVERE IS THIS PATIENT'S CONDITION?: HURLEY STAGE I: ABSCESS FORMATION (SINGLE OR MULTIPLE) WITHOUT SINUS TRACTS OR SCARRING

## 2022-09-22 NOTE — PROCEDURE: MEDICATION COUNSELING
Xelrubinz Pregnancy And Lactation Text: This medication is Pregnancy Category D and is not considered safe during pregnancy.  The risk during breast feeding is also uncertain.

## 2022-09-22 NOTE — PROCEDURE: MEDICATION COUNSELING
There are no preventive care reminders to display for this patient.    Patient is up to date, no discussion needed.           Winlevi Counseling:  I discussed with the patient the risks of topical clascoterone including but not limited to erythema, scaling, itching, and stinging. Patient voiced their understanding.

## 2022-09-22 NOTE — PROCEDURE: PRESCRIPTION MEDICATION MANAGEMENT
Render In Strict Bullet Format?: No
Detail Level: Zone
Initiate Treatment: Clindamycin benzoyl peroxide gel apply to thighs twice a day \\nDoxycycline 100mg twice a day for 10 days.
Plan: Return to clinic in 3 months.

## 2022-09-22 NOTE — PROCEDURE: MEDICATION COUNSELING
on synthroid, TSH, Free T4, HgbA1c Fasting lipid on synthroid, TSH, Free T4, HgbA1c , Fasting lipid Hydroxychloroquine Pregnancy And Lactation Text: This medication has been shown to cause fetal harm but it isn't assigned a Pregnancy Risk Category. There are small amounts excreted in breast milk.

## 2022-10-11 NOTE — PROCEDURE: MIPS QUALITY
Left Voicemail (1st Attempt) for the patient to call back and schedule the following:    Appointment type: return  Provider: Gem  Return date: Soonest  Specialty phone number: [phone]  Additional appointment(s) needed: labs  Additonal Notes: marie Ball  Procedure    Cardiology, Rheumatology, GI, Pulmonology, Nephrology Specialties   [facility]  [phone]      
Detail Level: Detailed
Quality 130: Documentation Of Current Medications In The Medical Record: Current Medications Documented
Quality 431: Preventive Care And Screening: Unhealthy Alcohol Use - Screening: Patient not identified as an unhealthy alcohol user when screened for unhealthy alcohol use using a systematic screening method
Quality 110: Preventive Care And Screening: Influenza Immunization: Influenza Immunization Administered during Influenza season
Quality 226: Preventive Care And Screening: Tobacco Use: Screening And Cessation Intervention: Patient screened for tobacco use and is an ex/non-smoker

## 2022-10-28 ENCOUNTER — PATIENT MESSAGE (OUTPATIENT)
Dept: HEMATOLOGY/ONCOLOGY | Facility: CLINIC | Age: 32
End: 2022-10-28
Payer: OTHER GOVERNMENT

## 2022-11-01 ENCOUNTER — TELEPHONE (OUTPATIENT)
Dept: HEMATOLOGY/ONCOLOGY | Facility: CLINIC | Age: 32
End: 2022-11-01
Payer: OTHER GOVERNMENT

## 2022-11-01 NOTE — TELEPHONE ENCOUNTER
----- Message from Andree Villa sent at 11/1/2022 12:11 PM CDT -----  Type: Needs Medical Advice  Who Called:  Dr. Nessa Garcia  Symptoms (please be specific):    How long has patient had these symptoms:    Pharmacy name and phone #:    Best Call Back Number: [phone]  Additional Information: Dr. Garcia is requesting a call back from Dr. Gaming to speak with him regarding a procedure a mutual patient is having done.

## 2022-11-03 ENCOUNTER — TELEPHONE (OUTPATIENT)
Dept: HEMATOLOGY/ONCOLOGY | Facility: CLINIC | Age: 32
End: 2022-11-03
Payer: OTHER GOVERNMENT

## 2022-11-03 NOTE — TELEPHONE ENCOUNTER
Called Dr barnes and informed her that I will be routing the message to Dr. Gaming    ----- Message from Thor Borjas sent at 11/3/2022 11:51 AM CDT -----  Type: Need Medical Advice  Who Called: Dr. Jose Chavez callback number: cell   Additional Info: Dr Garcia from [place] wanting to discuss former patient of Dr Amberly Chun  Please call to further assist, Thanks

## 2023-01-17 ENCOUNTER — APPOINTMENT (OUTPATIENT)
Dept: URBAN - METROPOLITAN AREA CLINIC 265 | Age: 33
Setting detail: DERMATOLOGY
End: 2023-01-18

## 2023-01-17 VITALS — WEIGHT: 190 LBS | HEIGHT: 64 IN | RESPIRATION RATE: 18 BRPM

## 2023-01-17 DIAGNOSIS — B02.9 ZOSTER WITHOUT COMPLICATIONS: ICD-10-CM

## 2023-01-17 PROCEDURE — OTHER MEDICATION COUNSELING: OTHER

## 2023-01-17 PROCEDURE — OTHER COUNSELING: OTHER

## 2023-01-17 PROCEDURE — OTHER PRESCRIPTION: OTHER

## 2023-01-17 PROCEDURE — OTHER MIPS QUALITY: OTHER

## 2023-01-17 PROCEDURE — OTHER PRESCRIPTION MEDICATION MANAGEMENT: OTHER

## 2023-01-17 PROCEDURE — 99213 OFFICE O/P EST LOW 20 MIN: CPT

## 2023-01-17 RX ORDER — ACYCLOVIR 400 MG/1
TABLET ORAL
Qty: 75 | Refills: 0 | Status: ERX | COMMUNITY
Start: 2023-01-17

## 2023-01-17 RX ORDER — METHYLPREDNISOLONE 4 MG/1
TABLET ORAL
Qty: 1 | Refills: 0 | Status: ERX | COMMUNITY
Start: 2023-01-17

## 2023-01-17 ASSESSMENT — LOCATION ZONE DERM: LOCATION ZONE: NECK

## 2023-01-17 ASSESSMENT — LOCATION SIMPLE DESCRIPTION DERM: LOCATION SIMPLE: C3 LEFT ANTERIOR DERMATOME

## 2023-01-17 ASSESSMENT — LOCATION DETAILED DESCRIPTION DERM: LOCATION DETAILED: C3 LEFT ANTERIOR DERMATOME

## 2023-01-17 NOTE — PROCEDURE: MEDICATION COUNSELING
TACHYCARDIC Propranolol Pregnancy And Lactation Text: This medication is Pregnancy Category C and it isn't known if it is safe during pregnancy. It is excreted in breast milk.

## 2023-01-17 NOTE — PROCEDURE: PRESCRIPTION MEDICATION MANAGEMENT
Initiate Treatment: Acyclovir 400mg take one pill by mouth five times per day for 7 days.\\nMedrol Use as directed
Render In Strict Bullet Format?: No
Detail Level: Zone
Plan: Follow up with PCP to check ear to make sure shingles has not spread into ear.\\nReturn to clinic in 3 days for follow up

## 2023-01-20 ENCOUNTER — APPOINTMENT (OUTPATIENT)
Dept: URBAN - METROPOLITAN AREA CLINIC 265 | Age: 33
Setting detail: DERMATOLOGY
End: 2023-01-20

## 2023-01-20 DIAGNOSIS — B02.9 ZOSTER WITHOUT COMPLICATIONS: ICD-10-CM

## 2023-01-20 PROCEDURE — OTHER MIPS QUALITY: OTHER

## 2023-01-20 PROCEDURE — OTHER MEDICATION COUNSELING: OTHER

## 2023-01-20 PROCEDURE — 99213 OFFICE O/P EST LOW 20 MIN: CPT

## 2023-01-20 PROCEDURE — OTHER COUNSELING: OTHER

## 2023-01-20 PROCEDURE — OTHER PRESCRIPTION MEDICATION MANAGEMENT: OTHER

## 2023-01-20 ASSESSMENT — LOCATION DETAILED DESCRIPTION DERM: LOCATION DETAILED: C3 LEFT ANTERIOR DERMATOME

## 2023-01-20 ASSESSMENT — LOCATION SIMPLE DESCRIPTION DERM: LOCATION SIMPLE: C3 LEFT ANTERIOR DERMATOME

## 2023-01-20 ASSESSMENT — LOCATION ZONE DERM: LOCATION ZONE: NECK

## 2023-01-20 NOTE — PROCEDURE: PRESCRIPTION MEDICATION MANAGEMENT
Render In Strict Bullet Format?: No
Continue Regimen: acyclovir 400 mg tablet \\nQuantity: 75.0 Tablet  Days Supply: 7\\nSig: Take one pill by mouth five times per day for seven days.\\n\\nMedrol (Juvencio) 4 mg tablets in a dose pack \\nQuantity: 1.0 Packet\\nSig: Use as directed
Detail Level: Zone
Plan: Improving, no new blisters.\\nReturn to clinic in 3 days for follow up

## 2023-01-20 NOTE — PROCEDURE: MEDICATION COUNSELING
----- Message from Naomie Ferrell sent at 5/23/2018  6:57 AM CDT -----  Contact: InThrMa request  Message     Appointment Request From: Melissa Bajwa    With Provider: Other - (see comments)    Would Accept With:Request to see a new provider    Preferred Date Range: From 5/25/2018 To 5/25/2018    Preferred Times: Any    Reason for visit: Request an Appt    Comments:  Hello,    I was wondering if I could schedule an ear wax cleaning appointment with Nurse Practitioner Marjorie Valenzuela, if possible for this Friday morning, 5/25? Thank you.       Doxepin Counseling:  Patient advised that the medication is sedating and not to drive a car after taking this medication. Patient informed of potential adverse effects including but not limited to dry mouth, urinary retention, and blurry vision.  The patient verbalized understanding of the proper use and possible adverse effects of doxepin.  All of the patient's questions and concerns were addressed.

## 2023-02-13 NOTE — PROCEDURE: MEDICATION COUNSELING
[General Appearance - Well Developed] : well developed [General Appearance - Well Nourished] : well nourished [Normal Appearance] : normal appearance [Well Groomed] : well groomed [General Appearance - In No Acute Distress] : no acute distress [Edema] : no peripheral edema [] : no respiratory distress [Respiration, Rhythm And Depth] : normal respiratory rhythm and effort [Exaggerated Use Of Accessory Muscles For Inspiration] : no accessory muscle use [Oriented To Time, Place, And Person] : oriented to person, place, and time [Affect] : the affect was normal [Mood] : the mood was normal [Not Anxious] : not anxious [Normal Station and Gait] : the gait and station were normal for the patient's age Infliximab Counseling:  I discussed with the patient the risks of infliximab including but not limited to myelosuppression, immunosuppression, autoimmune hepatitis, demyelinating diseases, lymphoma, and serious infections.  The patient understands that monitoring is required including a PPD at baseline and must alert us or the primary physician if symptoms of infection or other concerning signs are noted.

## 2023-09-17 NOTE — PROCEDURE: MEDICATION COUNSELING
oral Opioid Counseling: I discussed with the patient the potential side effects of opioids including but not limited to addiction, altered mental status, and depression. I stressed avoiding alcohol, benzodiazepines, muscle relaxants and sleep aids unless specifically okayed by a physician. The patient verbalized understanding of the proper use and possible adverse effects of opioids. All of the patient's questions and concerns were addressed. They were instructed to flush the remaining pills down the toilet if they did not need them for pain.

## 2025-02-12 NOTE — PROCEDURE: MEDICATION COUNSELING
Blood sugar is over 600, currently out of insulin, states it is a pharmacy error, been out of insulin for 4 to 5 weeks currently.    Infliximab Pregnancy And Lactation Text: This medication is Pregnancy Category B and is considered safe during pregnancy. It is unknown if this medication is excreted in breast milk.

## (undated) DEVICE — CONTAINER SPECIMEN STRL 4OZ

## (undated) DEVICE — PACK CUSTOM UNIV BASIN SLI

## (undated) DEVICE — SUT VICRYL PLUS 3-0 18IN

## (undated) DEVICE — SLEEVE SCD EXPRESS CALF MEDIUM

## (undated) DEVICE — SYR 10CC LUER LOCK

## (undated) DEVICE — BLADE PEAK PLASMA

## (undated) DEVICE — GLOVE BIOGEL SKINSENSE PI 8.0

## (undated) DEVICE — SKIN MARKER DEVON 160

## (undated) DEVICE — ELECTRODE REM PLYHSV RETURN 9

## (undated) DEVICE — PAD ABD 8X10 STERILE

## (undated) DEVICE — COVER PROBE SHEATH SURG 6X3IN

## (undated) DEVICE — SPONGE LAP 18X18 PREWASHED

## (undated) DEVICE — SEE MEDLINE ITEM 146417

## (undated) DEVICE — BRA CLASSIC COMFORM BLK SZ 38

## (undated) DEVICE — GLOVE BIOGEL SKINSENSE PI 6.5

## (undated) DEVICE — NDL HYPO REG 25G X 1 1/2

## (undated) DEVICE — SEE MEDLINE ITEM 152622

## (undated) DEVICE — SYR DISP LL 5CC

## (undated) DEVICE — DRAPE C-ARM FOR MOBILE XRAY

## (undated) DEVICE — CLIP APPLIER SMALL ETHICON

## (undated) DEVICE — SUT 3-0 VICRYL / SH (J416)

## (undated) DEVICE — GAUZE SPONGE BULKEE 6X6.75IN

## (undated) DEVICE — GLOVE SURG ULTRA TOUCH 8

## (undated) DEVICE — STRAP OR TABLE 5IN X 72IN

## (undated) DEVICE — SOL 9P NACL IRR PIC IL

## (undated) DEVICE — GLOVE BIOGEL SKINSENSE PI 7.0

## (undated) DEVICE — SEE MEDLINE ITEM 157131

## (undated) DEVICE — STAPLER SKIN ROTATING HEAD

## (undated) DEVICE — APPLIER CLIP LIAGCLIP 9.375IN

## (undated) DEVICE — SEE MEDLINE ITEM 146292

## (undated) DEVICE — ELECTRODE BLD EXT INSUL 1

## (undated) DEVICE — APPLICATOR CHLORAPREP ORN 26ML

## (undated) DEVICE — GLOVE BIOGEL SKINSENSE PI 7.5

## (undated) DEVICE — NDL SAFETY 25G X 1.5 ECLIPSE

## (undated) DEVICE — SEE MEDLINE ITEM 152537

## (undated) DEVICE — JELLY LUBRICATING STERILE 5 GR

## (undated) DEVICE — CLOSURE SKIN STERI STRIP 1/2X4

## (undated) DEVICE — Device

## (undated) DEVICE — ELECTRODE BLADE INSULATED 1 IN

## (undated) DEVICE — KIT DYE SPY ELITE

## (undated) DEVICE — SEE MEDLINE ITEM 157110

## (undated) DEVICE — POSITIONER IV ARMBOARD FOAM

## (undated) DEVICE — LINER SUCTION 3000CC

## (undated) DEVICE — PACK UNIV PROCEDURE

## (undated) DEVICE — WAVEGUIDE BRITEFIELD DISP

## (undated) DEVICE — SYS PRINEO SKIN CLOSURE

## (undated) DEVICE — SWABSTICK BENZOIN 4 IN

## (undated) DEVICE — UNDERGLOVES BIOGEL PI SZ 7 LF

## (undated) DEVICE — DRAPE LAP TIBURON 77X122IN

## (undated) DEVICE — SUT 2/0 30IN SILK BLK BRAI

## (undated) DEVICE — SUT MONOCRYL 4-0 SH UND MON

## (undated) DEVICE — APPLIER LIGACLIP MED 11IN

## (undated) DEVICE — DRESSING TRANS 4X4 TEGADERM

## (undated) DEVICE — SEE MEDLINE ITEM 157117

## (undated) DEVICE — SUT 2-0 VICRYL / SH (J417)